# Patient Record
Sex: MALE | Race: WHITE | NOT HISPANIC OR LATINO | Employment: OTHER | URBAN - METROPOLITAN AREA
[De-identification: names, ages, dates, MRNs, and addresses within clinical notes are randomized per-mention and may not be internally consistent; named-entity substitution may affect disease eponyms.]

---

## 2017-05-23 DIAGNOSIS — R73.9 HYPERGLYCEMIA: ICD-10-CM

## 2017-05-23 DIAGNOSIS — R00.1 BRADYCARDIA: ICD-10-CM

## 2017-05-23 DIAGNOSIS — R03.0 PREHYPERTENSION: ICD-10-CM

## 2017-05-23 DIAGNOSIS — R53.83 FATIGUE, UNSPECIFIED TYPE: ICD-10-CM

## 2017-05-23 DIAGNOSIS — E34.9 TESTOSTERONE DEFICIENCY: ICD-10-CM

## 2017-05-23 DIAGNOSIS — J98.11 ATELECTASIS OF LEFT LUNG: ICD-10-CM

## 2017-05-23 DIAGNOSIS — I51.89 DIASTOLIC DYSFUNCTION WITHOUT HEART FAILURE: ICD-10-CM

## 2017-05-23 DIAGNOSIS — R79.9 ELEVATED BUN: ICD-10-CM

## 2017-05-23 DIAGNOSIS — E83.52 CALCIUM BLOOD INCREASED: ICD-10-CM

## 2017-05-23 RX ORDER — LORATADINE 10 MG/1
10 TABLET ORAL DAILY
COMMUNITY

## 2017-05-25 ENCOUNTER — OFFICE VISIT (OUTPATIENT)
Dept: FAMILY MEDICINE | Facility: CLINIC | Age: 77
End: 2017-05-25
Payer: MEDICARE

## 2017-05-25 VITALS
WEIGHT: 215 LBS | HEIGHT: 74 IN | HEART RATE: 64 BPM | BODY MASS INDEX: 27.59 KG/M2 | OXYGEN SATURATION: 96 % | SYSTOLIC BLOOD PRESSURE: 139 MMHG | DIASTOLIC BLOOD PRESSURE: 78 MMHG

## 2017-05-25 DIAGNOSIS — E78.5 DYSLIPIDEMIA: ICD-10-CM

## 2017-05-25 DIAGNOSIS — Z87.891 HISTORY OF SMOKING GREATER THAN 50 PACK YEARS: ICD-10-CM

## 2017-05-25 DIAGNOSIS — R73.9 HYPERGLYCEMIA: ICD-10-CM

## 2017-05-25 DIAGNOSIS — F32.A DEPRESSION, UNSPECIFIED DEPRESSION TYPE: ICD-10-CM

## 2017-05-25 DIAGNOSIS — R03.0 PREHYPERTENSION: Primary | ICD-10-CM

## 2017-05-25 DIAGNOSIS — R53.83 FATIGUE, UNSPECIFIED TYPE: ICD-10-CM

## 2017-05-25 PROBLEM — M19.90 ARTHRITIS: Status: ACTIVE | Noted: 2017-05-25

## 2017-05-25 PROCEDURE — 99214 OFFICE O/P EST MOD 30 MIN: CPT | Mod: ,,, | Performed by: FAMILY MEDICINE

## 2017-05-25 PROCEDURE — 1159F MED LIST DOCD IN RCRD: CPT | Mod: ,,, | Performed by: FAMILY MEDICINE

## 2017-05-25 NOTE — PROGRESS NOTES
SUBJECTIVE:   HPI: Gilberto Lee  is a 76 y.o. male who complains of fatigue   Hypertension (pre-HTN f/u); Hyperlipidemia (f/u); and Fatigue    Hypertension   This is a chronic problem. The problem has been waxing and waning since onset. Pertinent negatives include no chest pain, headaches or palpitations. There are no compliance problems.  There is no history of chronic renal disease.   Hyperlipidemia   This is a chronic problem. The current episode started more than 1 year ago. Recent lipid tests were reviewed and are variable. He has no history of chronic renal disease, hypothyroidism or liver disease. Pertinent negatives include no chest pain. Risk factors for coronary artery disease include dyslipidemia.   Fatigue   This is a recurrent problem. The problem occurs intermittently. The problem has been waxing and waning. Associated symptoms include fatigue. Pertinent negatives include no abdominal pain, arthralgias, change in bowel habit, chest pain, chills, diaphoresis, headaches, numbness, rash, vertigo, visual change or weakness. Nothing aggravates the symptoms. He has tried nothing for the symptoms.     Review of Systems   Constitutional: Positive for fatigue. Negative for activity change, appetite change, chills and diaphoresis.   HENT: Negative for ear discharge, ear pain and hearing loss.    Eyes: Negative for discharge, redness and itching.   Respiratory: Negative for apnea, chest tightness and wheezing.    Cardiovascular: Negative for chest pain, palpitations and leg swelling.   Gastrointestinal: Negative for abdominal distention, abdominal pain and change in bowel habit.   Endocrine: Negative for cold intolerance and polydipsia.   Genitourinary: Negative for dysuria.   Musculoskeletal: Negative for arthralgias and gait problem.   Skin: Negative for color change, pallor and rash.   Neurological: Negative for dizziness, vertigo, weakness, numbness and headaches.   Hematological: Negative for  adenopathy.   Psychiatric/Behavioral: Negative for agitation.        (Not in a hospital admission)  Review of patient's allergies indicates:   Allergen Reactions    Demerol  [meperidine]      Agitation  Other reaction(s): Unknown     Past Medical History:   Diagnosis Date    Arthritis     Atelectasis of left lung 07/2015    Bradycardia 07/17/2015    Calcium blood increased 08/09/2016    Depression     Diastolic dysfunction without heart failure 07/2015    Elevated BUN 08/09/2016    Fatigue 04/29/2015    Hyperglycemia 08/09/2016    Hyperlipidemia     Migraine without aura 10/2015    Prehypertension 11/2016    Testosterone deficiency 04/2015     Past Surgical History:   Procedure Laterality Date    ACHILLES TENDON SURGERY      APPENDECTOMY      DUPUYTREN CONTRACTURE RELEASE  2014    LUMBAR EPIDURAL INJECTION  2013    PROSTATE SURGERY      SACROILIAC JOINT INJECTION      TONSILLECTOMY      TRANSURETHRAL RESECTION OF PROSTATE       Family History   Problem Relation Age of Onset    COPD Father      Social History   Substance Use Topics    Smoking status: Former Smoker     Quit date: 7/23/1972    Smokeless tobacco: Not on file    Alcohol use Yes      Comment: occassional      Health Maintenance Topics with due status: Not Due       Topic Last Completion Date    Colonoscopy 01/01/2009    TETANUS VACCINE 10/21/2011    Lipid Panel 08/01/2016    Influenza Vaccine 11/09/2016     Immunization History   Administered Date(s) Administered    Influenza - High Dose 10/05/2013, 10/15/2014, 11/03/2015, 11/09/2016    Pneumococcal Conjugate - 13 Valent 11/03/2015    Pneumococcal Polysaccharide - 23 Valent 01/01/2012    Td (ADULT) 12/28/2007, 10/21/2011    Zoster 10/26/2010     OBJECTIVE:      Vitals:    05/25/17 0810   BP: 139/78   Pulse: 64     Physical Exam   Constitutional: He appears well-developed and well-nourished.   HENT:   Head: Normocephalic and atraumatic.   Right Ear: External ear normal.    Left Ear: External ear normal.   Eyes: EOM are normal. Pupils are equal, round, and reactive to light. Right eye exhibits no discharge. Left eye exhibits no discharge. No scleral icterus.   Neck: Normal range of motion. No tracheal deviation present. No thyromegaly present.   Cardiovascular: Normal rate and regular rhythm.  Exam reveals no gallop and no friction rub.    No murmur heard.  Pulmonary/Chest: No respiratory distress. He has no wheezes. He has no rales. He exhibits no tenderness.   Abdominal: Soft. Bowel sounds are normal. He exhibits no distension. There is no tenderness.   Musculoskeletal: He exhibits no edema or tenderness.   Neurological: No cranial nerve deficit.   Skin: No rash noted. No erythema.   Psychiatric: He has a normal mood and affect.      Assessment:       1. Fatigue, unspecified type    2. Hypercholesteremia    3. Hyperglycemia    4. Dyslipidemia    5. Depression, unspecified depression type    6. History of smoking greater than 50 pack years        Plan:       Fatigue, unspecified type  -     CBC auto differential; Future; Expected date: 05/25/2017  -     Comprehensive metabolic panel; Future; Expected date: 05/25/2017  -     Hemoglobin A1c; Future; Expected date: 05/25/2017  -     Lipid panel; Future; Expected date: 05/25/2017  -     Microalbumin/creatinine urine ratio; Future; Expected date: 05/25/2017  -     TSH; Future; Expected date: 05/25/2017    Hyperglycemia  -     Hemoglobin A1c; Future; Expected date: 05/25/2017  -     Microalbumin/creatinine urine ratio; Future; Expected date: 05/25/2017    Dyslipidemia  -     Lipid panel; Future; Expected date: 05/25/2017    Depression, unspecified depression type  Comments:  > 20 years, on wellbutrin and ritalin, see DR. Upton in [place]  advise pt to f.u for medicine adjustment.   Orders:  -     CBC auto differential; Future; Expected date: 05/25/2017  -     Comprehensive metabolic panel; Future; Expected date: 05/25/2017  -     Hemoglobin  A1c; Future; Expected date: 05/25/2017  -     Lipid panel; Future; Expected date: 05/25/2017  -     Microalbumin/creatinine urine ratio; Future; Expected date: 05/25/2017  -     TSH; Future; Expected date: 05/25/2017    History of smoking greater than 50 pack years  -      Abdominal Aorta; Future; Expected date: 05/25/2017    Prehypertension    Other orders  -     TSH; Future      5/25/2017 12:30 PM Meena Bernard M.D.

## 2017-05-26 ENCOUNTER — TELEPHONE (OUTPATIENT)
Dept: FAMILY MEDICINE | Facility: CLINIC | Age: 77
End: 2017-05-26

## 2017-05-26 DIAGNOSIS — E83.52 HYPERCALCEMIA: Primary | ICD-10-CM

## 2017-05-26 LAB
ALBUMIN SERPL-MCNC: 4.1 G/DL (ref 3.1–4.7)
ALP SERPL-CCNC: 69 IU/L (ref 40–104)
ALT (SGPT): 27 IU/L (ref 3–33)
AST SERPL-CCNC: 37 IU/L (ref 10–40)
BASOPHILS NFR BLD: 0.1 K/UL (ref 0–0.2)
BASOPHILS NFR BLD: 1 %
BILIRUB SERPL-MCNC: 0.8 MG/DL (ref 0.3–1)
BUN SERPL-MCNC: 19 MG/DL (ref 8–20)
CALCIUM SERPL-MCNC: 10.8 MG/DL (ref 7.7–10.4)
CHLORIDE: 101 MMOL/L (ref 98–110)
CO2 SERPL-SCNC: 29.4 MMOL/L (ref 22.8–31.6)
CREATININE RANDOM URINE: 105 MG/DL
CREATININE: 1.09 MG/DL (ref 0.6–1.4)
EOSINOPHIL NFR BLD: 0.2 K/UL (ref 0–0.7)
EOSINOPHIL NFR BLD: 3.9 %
ERYTHROCYTE [DISTWIDTH] IN BLOOD BY AUTOMATED COUNT: 12.5 % (ref 11.7–14.9)
GLUCOSE: 112 MG/DL (ref 70–99)
GRAN #: 3.1 K/UL (ref 1.4–6.5)
GRAN%: 52.6 %
HBA1C MFR BLD: 5.6 % (ref 3.1–6.5)
HCT VFR BLD AUTO: 43.4 % (ref 39–55)
HGB BLD-MCNC: 14.8 G/DL (ref 14–16)
IMMATURE GRANS (ABS): 0 K/UL (ref 0–1)
IMMATURE GRANULOCYTES: 0.2 %
LYMPH #: 2 K/UL (ref 1.2–3.4)
LYMPH%: 33.1 %
MCH RBC QN AUTO: 31 PG (ref 25–35)
MCHC RBC AUTO-ENTMCNC: 34.1 G/DL (ref 31–36)
MCV RBC AUTO: 91 FL (ref 80–100)
MICROALBUM.,U,RANDOM: <2 MCG/ML (ref 0–19.9)
MICROALBUMIN/CREATININE RATIO: NORMAL (ref 0–30)
MONO #: 0.6 K/UL (ref 0.1–0.6)
MONO%: 9.2 %
NUCLEATED RBCS: 0 %
PLATELET # BLD AUTO: 244 K/UL (ref 140–440)
PMV BLD AUTO: 9 FL (ref 8.8–12.7)
POTASSIUM SERPL-SCNC: 4.4 MMOL/L (ref 3.5–5)
PROT SERPL-MCNC: 7.3 G/DL (ref 6–8.2)
RBC # BLD AUTO: 4.77 M/UL (ref 4.3–5.9)
SODIUM: 137 MMOL/L (ref 134–144)
TSH SERPL DL<=0.005 MIU/L-ACNC: 2.64 ULU/ML (ref 0.3–5.6)
WBC # BLD AUTO: 6 K/UL (ref 5–10)

## 2017-05-30 ENCOUNTER — TELEPHONE (OUTPATIENT)
Dept: FAMILY MEDICINE | Facility: CLINIC | Age: 77
End: 2017-05-30

## 2017-05-30 LAB
MAGNESIUM SERPL-MCNC: 2.1 MG/DL (ref 1.5–2.6)
VITAMIN D, 1,25 (OH)2: 67.2 NG/ML (ref 30–100)

## 2017-05-30 NOTE — TELEPHONE ENCOUNTER
----- Message from Meena Bernard MD sent at 5/30/2017  2:59 PM CDT -----  Notify pt : abnormal result ( PTH high) , please make an appointment with me to review in two weeks

## 2017-06-22 ENCOUNTER — TELEPHONE (OUTPATIENT)
Dept: ENDOCRINOLOGY | Facility: CLINIC | Age: 77
End: 2017-06-22

## 2017-06-22 ENCOUNTER — OFFICE VISIT (OUTPATIENT)
Dept: FAMILY MEDICINE | Facility: CLINIC | Age: 77
End: 2017-06-22
Payer: MEDICARE

## 2017-06-22 VITALS
DIASTOLIC BLOOD PRESSURE: 86 MMHG | SYSTOLIC BLOOD PRESSURE: 132 MMHG | BODY MASS INDEX: 27.21 KG/M2 | HEART RATE: 66 BPM | HEIGHT: 74 IN | OXYGEN SATURATION: 95 % | WEIGHT: 212 LBS

## 2017-06-22 DIAGNOSIS — R53.83 FATIGUE, UNSPECIFIED TYPE: ICD-10-CM

## 2017-06-22 DIAGNOSIS — R79.89 INCREASED PTH LEVEL: ICD-10-CM

## 2017-06-22 DIAGNOSIS — E83.52 SERUM CALCIUM ELEVATED: ICD-10-CM

## 2017-06-22 DIAGNOSIS — E21.0 PRIMARY HYPERPARATHYROIDISM: Primary | ICD-10-CM

## 2017-06-22 LAB — PHOSPHATE FLD-MCNC: 2.6 MG/DL (ref 2.5–4.9)

## 2017-06-22 PROCEDURE — 1159F MED LIST DOCD IN RCRD: CPT | Mod: ,,, | Performed by: FAMILY MEDICINE

## 2017-06-22 PROCEDURE — 99214 OFFICE O/P EST MOD 30 MIN: CPT | Mod: ,,, | Performed by: FAMILY MEDICINE

## 2017-06-22 NOTE — PROGRESS NOTES
SUBJECTIVE:   HPI: Gilberto Lee  is a 76 y.o. male who complains of fatigue, follow up with abnormal lab result ( high on PTH and calcium)     Fatigue   This is a recurrent problem. The problem occurs intermittently. The problem has been waxing and waning. Associated symptoms include fatigue. Pertinent negatives include no abdominal pain, arthralgias, change in bowel habit, chest pain, chills, diaphoresis, headaches, numbness, rash, vertigo, visual change or weakness. Nothing aggravates the symptoms. He has tried nothing for the symptoms.     Review of Systems   Constitutional: Positive for fatigue. Negative for activity change, appetite change, chills and diaphoresis.   HENT: Negative for ear discharge, ear pain and hearing loss.    Eyes: Negative for discharge, redness and itching.   Respiratory: Negative for apnea, chest tightness and wheezing.    Cardiovascular: Negative for chest pain, palpitations and leg swelling.   Gastrointestinal: Negative for abdominal distention, abdominal pain and change in bowel habit.   Endocrine: Negative for cold intolerance and polydipsia.   Genitourinary: Negative for dysuria.   Musculoskeletal: Negative for arthralgias and gait problem.   Skin: Negative for color change, pallor and rash.   Neurological: Negative for dizziness, vertigo, weakness, numbness and headaches.   Hematological: Negative for adenopathy.   Psychiatric/Behavioral: Negative for agitation.        (Not in a hospital admission)  Review of patient's allergies indicates:   Allergen Reactions    Demerol  [meperidine]      Agitation  Other reaction(s): Unknown     Past Medical History:   Diagnosis Date    Arthritis     Atelectasis of left lung 07/2015    Bradycardia 07/17/2015    Calcium blood increased 08/09/2016    Depression     Diastolic dysfunction without heart failure 07/2015    Elevated BUN 08/09/2016    Fatigue 04/29/2015    Hyperglycemia 08/09/2016    Hyperlipidemia     Migraine without  aura 10/2015    Prehypertension 11/2016    Testosterone deficiency 04/2015     Past Surgical History:   Procedure Laterality Date    ACHILLES TENDON SURGERY      APPENDECTOMY      DUPUYTREN CONTRACTURE RELEASE  2014    LUMBAR EPIDURAL INJECTION  2013    PROSTATE SURGERY      SACROILIAC JOINT INJECTION      TONSILLECTOMY      TRANSURETHRAL RESECTION OF PROSTATE       Family History   Problem Relation Age of Onset    COPD Father      Social History   Substance Use Topics    Smoking status: Former Smoker     Quit date: 7/23/1972    Smokeless tobacco: Not on file    Alcohol use Yes      Comment: occassional      Health Maintenance Topics with due status: Not Due       Topic Last Completion Date    Colonoscopy 01/01/2009    TETANUS VACCINE 10/21/2011    Lipid Panel 08/01/2016    Influenza Vaccine 11/09/2016     Immunization History   Administered Date(s) Administered    Influenza - High Dose 10/05/2013, 10/15/2014, 11/03/2015, 11/09/2016    Pneumococcal Conjugate - 13 Valent 11/03/2015    Pneumococcal Polysaccharide - 23 Valent 01/01/2012    Td (ADULT) 12/28/2007, 10/21/2011    Zoster 10/26/2010     OBJECTIVE:      Vitals:    06/22/17 1026   BP: 132/86   Pulse: 66     Physical Exam   Constitutional: He appears well-developed and well-nourished.   HENT:   Head: Normocephalic and atraumatic.   Right Ear: External ear normal.   Left Ear: External ear normal.   Eyes: EOM are normal. Pupils are equal, round, and reactive to light. Right eye exhibits no discharge. Left eye exhibits no discharge. No scleral icterus.   Neck: Normal range of motion. No tracheal deviation present. No thyromegaly present.   Cardiovascular: Normal rate and regular rhythm.  Exam reveals no gallop and no friction rub.    No murmur heard.  Pulmonary/Chest: No respiratory distress. He has no wheezes. He has no rales. He exhibits no tenderness.   Abdominal: Soft. Bowel sounds are normal. He exhibits no distension. There is no  tenderness.   Musculoskeletal: He exhibits no edema or tenderness.   Neurological: No cranial nerve deficit.   Skin: No rash noted. No erythema.   Psychiatric: He has a normal mood and affect.      Assessment:       1. Fatigue, unspecified type    2 Primary hyperparathyroidism   3 Elevated calcium   4  Elevated PTH               Plan:     Primary hyperparathyroidism  Comments:   in 5/26/2017.  ca 10.8.   his of calcium high.  ca 10.7 in 8/2016, 11/2016 wnl . refer to endo  Orders:  -     Calcium; Future; Expected date: 06/22/2017  -     Phosphorus; Future; Expected date: 06/22/2017  -     Phosphorus; Future; Expected date: 06/22/2017  -     Ambulatory referral to Endocrinology    Serum calcium elevated  Comments:  10.8 ,  Vitamin D wnl.  advise pt to stop taking vitamin d supplement. Well hydration  Orders:  -     Calcium; Future; Expected date: 06/22/2017  -     Phosphorus; Future; Expected date: 06/22/2017  -     Phosphorus; Future; Expected date: 06/22/2017  -     Ambulatory referral to Endocrinology    Fatigue, unspecified type  Comments:  likely caused by high ca.   Orders:  -     Calcium; Future; Expected date: 06/22/2017  -     Phosphorus; Future; Expected date: 06/22/2017  -     Phosphorus; Future; Expected date: 06/22/2017  -     Ambulatory referral to Endocrinology    Increased PTH level  -     Calcium; Future; Expected date: 06/22/2017  -     Phosphorus; Future; Expected date: 06/22/2017  -     Phosphorus; Future; Expected date: 06/22/2017  -     Ambulatory referral to Endocrinology    Advise RTC or ER if worsening of symptoms , pt voices understanding.

## 2017-06-22 NOTE — TELEPHONE ENCOUNTER
Received a faxed referral from Dr DE LUNA office..  Scheduled appt and added to wait list. Called patient left message to return call

## 2017-06-29 ENCOUNTER — PATIENT MESSAGE (OUTPATIENT)
Dept: FAMILY MEDICINE | Facility: CLINIC | Age: 77
End: 2017-06-29

## 2017-07-03 LAB — MISCELLANEOUS LAB TEST ORDER: NORMAL

## 2017-07-05 ENCOUNTER — TELEPHONE (OUTPATIENT)
Dept: FAMILY MEDICINE | Facility: CLINIC | Age: 77
End: 2017-07-05

## 2017-07-05 PROBLEM — R82.994 HYPERCALCIURIA: Status: ACTIVE | Noted: 2017-07-05

## 2017-07-05 NOTE — TELEPHONE ENCOUNTER
Abnormal lab result:  24 hrs calcium is 495 ( normal <350). Likely 2/2 primary  Hyperparathyroidism. Advise pt follow up with Endo. thanks

## 2017-07-10 ENCOUNTER — PATIENT MESSAGE (OUTPATIENT)
Dept: FAMILY MEDICINE | Facility: CLINIC | Age: 77
End: 2017-07-10

## 2017-07-10 DIAGNOSIS — E78.00 HYPERCHOLESTEREMIA: ICD-10-CM

## 2017-07-10 DIAGNOSIS — R51.9 GENERALIZED HEADACHES: ICD-10-CM

## 2017-07-10 RX ORDER — SIMVASTATIN 20 MG/1
TABLET, FILM COATED ORAL
Qty: 90 TABLET | Refills: 1 | Status: SHIPPED | OUTPATIENT
Start: 2017-07-10 | End: 2018-02-02 | Stop reason: SDUPTHER

## 2017-07-10 RX ORDER — SUMATRIPTAN SUCCINATE 100 MG/1
TABLET ORAL
Qty: 27 TABLET | Refills: 1 | Status: SHIPPED | OUTPATIENT
Start: 2017-07-10 | End: 2018-02-02 | Stop reason: SDUPTHER

## 2017-08-01 ENCOUNTER — OFFICE VISIT (OUTPATIENT)
Dept: FAMILY MEDICINE | Facility: CLINIC | Age: 77
End: 2017-08-01
Payer: MEDICARE

## 2017-08-01 VITALS
HEIGHT: 74 IN | RESPIRATION RATE: 18 BRPM | WEIGHT: 214.19 LBS | OXYGEN SATURATION: 98 % | HEART RATE: 89 BPM | SYSTOLIC BLOOD PRESSURE: 134 MMHG | DIASTOLIC BLOOD PRESSURE: 86 MMHG | BODY MASS INDEX: 27.49 KG/M2

## 2017-08-01 DIAGNOSIS — R53.83 FATIGUE, UNSPECIFIED TYPE: ICD-10-CM

## 2017-08-01 DIAGNOSIS — R03.0 PREHYPERTENSION: ICD-10-CM

## 2017-08-01 DIAGNOSIS — K59.00 CONSTIPATION, UNSPECIFIED CONSTIPATION TYPE: ICD-10-CM

## 2017-08-01 DIAGNOSIS — E21.3 HYPERPARATHYROIDISM: Primary | ICD-10-CM

## 2017-08-01 PROBLEM — N40.0 BPH WITHOUT OBSTRUCTION/LOWER URINARY TRACT SYMPTOMS: Status: ACTIVE | Noted: 2017-08-01

## 2017-08-01 PROCEDURE — 99213 OFFICE O/P EST LOW 20 MIN: CPT | Mod: ,,, | Performed by: INTERNAL MEDICINE

## 2017-08-01 PROCEDURE — 1159F MED LIST DOCD IN RCRD: CPT | Mod: ,,, | Performed by: INTERNAL MEDICINE

## 2017-08-01 NOTE — PROGRESS NOTES
Subjective:       Patient ID: Gilberto Lee is a 76 y.o. male.    Chief Complaint: Follow-up; Hyperparathyroidism; and Fatigue    Mr Gilberto Lee is here for follow up for his hyperparathyroidism which is primary and secondary to an adrenal adenoma. He was evaluated in [place] by a surgical specialist who ONLY does minimally invasive parathyroidectomy with intraoperative nuclear med sestimibi with biopsies of every parathyroid gland. The cost of the evaluation by Dr Obrien was free and the surgery will only net cost 14,000 with no down time. The traditional surgery is an inpatient stay and quite invasive. Patient is here asking for a referral and possibly an appeal to [facility] who denied it because it was a surgery that was outside of a certain radius.        Review of Systems   Constitutional: Positive for fatigue. Negative for activity change, diaphoresis and fever.   HENT: Negative for congestion, ear pain, postnasal drip, sinus pressure, sore throat and trouble swallowing.    Eyes: Negative for pain.   Respiratory: Negative for cough, chest tightness, shortness of breath and wheezing.    Cardiovascular: Negative for chest pain, palpitations and leg swelling.   Gastrointestinal: Positive for constipation. Negative for abdominal distention, abdominal pain, blood in stool and diarrhea.   Endocrine: Negative for cold intolerance and heat intolerance.   Genitourinary: Negative for decreased urine volume, difficulty urinating, dysuria, flank pain, frequency and hematuria.   Musculoskeletal: Positive for neck pain. Negative for arthralgias, back pain, joint swelling and myalgias.   Skin: Negative for pallor, rash and wound.   Neurological: Positive for headaches. Negative for tremors, syncope, weakness, light-headedness and numbness.   Hematological: Negative for adenopathy.   Psychiatric/Behavioral: Negative for confusion, decreased concentration, hallucinations, self-injury, sleep disturbance and suicidal  ideas. The patient is not nervous/anxious.        Past Medical History:   Diagnosis Date    Arthritis     Atelectasis of left lung 07/2015    Bradycardia 07/17/2015    Calcium blood increased 08/09/2016    Depression     Diastolic dysfunction without heart failure 07/2015    Elevated BUN 08/09/2016    Fatigue 04/29/2015    Hyperglycemia 08/09/2016    Hyperlipidemia     Migraine without aura 10/2015    Prehypertension 11/2016    Testosterone deficiency 04/2015    Thyroid disease        Past Surgical History:   Procedure Laterality Date    ACHILLES TENDON SURGERY      APPENDECTOMY      DUPUYTREN CONTRACTURE RELEASE  2014    LUMBAR EPIDURAL INJECTION  2013    PROSTATE SURGERY      SACROILIAC JOINT INJECTION      TONSILLECTOMY      TRANSURETHRAL RESECTION OF PROSTATE         Family History   Problem Relation Age of Onset    COPD Father        Social History     Social History    Marital status:      Spouse name: N/A    Number of children: N/A    Years of education: N/A     Social History Main Topics    Smoking status: Former Smoker     Quit date: 7/23/1972    Smokeless tobacco: Never Used    Alcohol use Yes      Comment: occassional    Drug use: No    Sexual activity: Not Asked     Other Topics Concern    None     Social History Narrative    None       Current Outpatient Prescriptions   Medication Sig Dispense Refill    buPROPion (WELLBUTRIN) 75 MG tablet Take 150 mg by mouth 3 (three) times daily. 2 Tablet TID      glucosamine-chondroit-vit C-Mn (GLUCOSAMINE CHONDROITIN MAXSTR) 500-400 mg Cap Take 1 tablet by mouth 3 (three) times daily. 3 Capsule Oral Every day      loratadine (CLARITIN) 10 mg tablet Take 10 mg by mouth once daily.      methylphenidate (RITALIN) 10 MG tablet Take 10 mg by mouth. 1 Tablet Oral Three times a day      naproxen sodium (ANAPROX) 220 MG tablet Take 220 mg by mouth 2 (two) times daily with meals. 1 Tablet Oral Twice a day       omega-3 fatty  acids 1,000 mg Cap Take by mouth once daily. 1 Capsule Oral Every day      simvastatin (ZOCOR) 20 MG tablet TAKE 1 TABLET EVERY DAY 90 tablet 1    sumatriptan (IMITREX) 100 MG tablet TAKE 1 TABLET  EVERY  2  HOURS AS NEEDED 27 tablet 1     No current facility-administered medications for this visit.        Review of patient's allergies indicates:   Allergen Reactions    Demerol  [meperidine]      Agitation  Other reaction(s): Unknown       Objective:      Physical Exam   Constitutional: He is oriented to person, place, and time. He appears well-developed and well-nourished. No distress.   HENT:   Head: Normocephalic and atraumatic.   Right Ear: External ear normal.   Left Ear: External ear normal.   Nose: Nose normal.   Mouth/Throat: Oropharynx is clear and moist.   Eyes: Conjunctivae and EOM are normal. Right eye exhibits no discharge. Left eye exhibits no discharge. No scleral icterus.   Neck: Normal range of motion. Neck supple. No JVD present. No tracheal deviation present. No thyroid mass and no thyromegaly present.   Cardiovascular: Normal rate, regular rhythm, normal heart sounds and intact distal pulses.  Exam reveals no gallop.    No murmur heard.  Pulmonary/Chest: Effort normal and breath sounds normal. No respiratory distress. He has no wheezes. He has no rales.   Abdominal: Soft. Bowel sounds are normal. He exhibits no distension and no mass. There is no tenderness.   Musculoskeletal: Normal range of motion. He exhibits no edema or tenderness.   Lymphadenopathy:     He has no cervical adenopathy.   Neurological: He is alert and oriented to person, place, and time.   Skin: Skin is warm and dry. No rash noted. He is not diaphoretic. No erythema.   Psychiatric: He has a normal mood and affect. His behavior is normal. Judgment and thought content normal.     .  Assessment:       1. Hyperparathyroidism    2. Prehypertension    3. Constipation, unspecified constipation type    4. Fatigue, unspecified type         Plan:       Hyperparathyroidism  -     Ambulatory referral to General Surgery- [facility] in [place], [place], Dr Jaylon Obrien  Prehypertension- secondary to hyperparathyroidism   Constipation, unspecified constipation type-secondary to hyperparathyroidism   Fatigue, unspecified type-secondary to hyperparathyroidism

## 2017-08-01 NOTE — PATIENT INSTRUCTIONS
Understanding Primary Hyperparathyroidism    The parathyroid glands are 4 tiny glands in the neck. They make parathyroid hormone (PTH). PTH controls the amount of calcium and phosphorus in your blood. Primary hyperparathyroidism is when there is too much PTH in your blood. It occurs when one or more of the glands are too active.  What causes primary hyperparathyroidism?  Primary hyperparathyroidism can occur when a parathyroid gland becomes enlarged. Hyperparathyroidism can also occur as a complication of other medical conditions, such as kidney failure, or rickets, but in these conditions, calcium is usually not high. This is called secondary hyperthyroidism.  Why is it a problem?  With primary hyperparathyroidism, your glands make too much PTH. The job of PTH is to tell the body how to control calcium. Too much PTH means the body increases the amount of calcium in the blood. This leads to a problem called hypercalcemia. This is when the amount of calcium in the blood becomes too high. Hypercalcemia can cause serious health problems.  High calcium can be caused by other conditions, like taking too many calcium and vitamin D supplements, certain genetic conditions, or other circumstances. Your doctor will find out if primary hyperparathyroidism is the cause of your high calcium levels. If so, treatment options will be discussed with you.  What are the risk factors for primary hyperparathyroidism?  The risk factors for developing this condition include:  · Being a woman (its less common in men)  · Being older (its more likely to occur with age)  · Having parents or siblings with the condition or other endocrine tumors  · Having certain kidney problems  · Taking certain medications  · Having had radiation treatment in the head or neck  What are the symptoms of primary hyperparathyroidism?  Symptoms of the condition can include:  · Muscle weakness  · Depression  · Tiredness  · Confusion and memory loss  · Poor  memory  · Nausea and vomiting  · Pain in the stomach area (abdomen)  · Hard stools (constipation)  · Stomach ulcers  · Need to urinate often  · Kidney stones  · Joint or bone pain  · Bone disease (osteopenia or osteoporosis), an increase in bone fractures  · High blood pressure  · Increased thirst  How is primary hyperparathyroidism treated?  If primary hyperparathyroidism is not treated, it can get worse over time. Surgery may be done to remove any enlarged parathyroid glands. This lets the amount of calcium in the blood go back to normal. Your doctors may want to ensure that you have enough vitamin D and calcium nutrition before the surgery. This will reduce the risk of low calcium after the surgery. You and your doctor can discuss your treatment options. Be sure to ask any questions you have.  Date Last Reviewed: 8/13/2014  © 4743-3168 [employer]. [address] [place] [address], [place] [address]. All rights reserved. This information is not intended as a substitute for professional medical care. Always follow your healthcare professional's instructions.

## 2017-08-03 ENCOUNTER — PATIENT MESSAGE (OUTPATIENT)
Dept: FAMILY MEDICINE | Facility: CLINIC | Age: 77
End: 2017-08-03

## 2017-08-09 ENCOUNTER — PATIENT MESSAGE (OUTPATIENT)
Dept: FAMILY MEDICINE | Facility: CLINIC | Age: 77
End: 2017-08-09

## 2017-08-14 ENCOUNTER — TELEPHONE (OUTPATIENT)
Dept: FAMILY MEDICINE | Facility: CLINIC | Age: 77
End: 2017-08-14

## 2017-08-14 NOTE — TELEPHONE ENCOUNTER
Pt wife Kristin has called, she states her  has been calling and leaving messages regarding his referral.  Can you please scot her at [phone]

## 2017-08-31 DIAGNOSIS — E21.3 HYPERPARATHYROIDISM: ICD-10-CM

## 2017-08-31 DIAGNOSIS — R79.89 INCREASED PTH LEVEL: Primary | ICD-10-CM

## 2017-09-28 ENCOUNTER — PATIENT MESSAGE (OUTPATIENT)
Dept: FAMILY MEDICINE | Facility: CLINIC | Age: 77
End: 2017-09-28

## 2017-09-28 ENCOUNTER — TELEPHONE (OUTPATIENT)
Dept: FAMILY MEDICINE | Facility: CLINIC | Age: 77
End: 2017-09-28

## 2017-09-28 DIAGNOSIS — E21.3 HYPERPARATHYROIDISM: Primary | ICD-10-CM

## 2017-09-28 NOTE — TELEPHONE ENCOUNTER
Patient states that the surgeon order labs and he doesn't think they will be accepted here. He is requesting that we generate the orders for him. The orders that the surgeon generated where: Serum Calcium and PTH, Intact Frozen-speciman

## 2017-11-21 ENCOUNTER — OFFICE VISIT (OUTPATIENT)
Dept: FAMILY MEDICINE | Facility: CLINIC | Age: 77
End: 2017-11-21
Payer: MEDICARE

## 2017-11-21 VITALS
BODY MASS INDEX: 27.72 KG/M2 | SYSTOLIC BLOOD PRESSURE: 130 MMHG | WEIGHT: 216 LBS | HEART RATE: 68 BPM | DIASTOLIC BLOOD PRESSURE: 88 MMHG | HEIGHT: 74 IN | OXYGEN SATURATION: 98 % | RESPIRATION RATE: 16 BRPM

## 2017-11-21 DIAGNOSIS — G43.009 MIGRAINE WITHOUT AURA AND RESPONSIVE TO TREATMENT: ICD-10-CM

## 2017-11-21 DIAGNOSIS — E78.5 DYSLIPIDEMIA: ICD-10-CM

## 2017-11-21 DIAGNOSIS — R53.83 FATIGUE, UNSPECIFIED TYPE: ICD-10-CM

## 2017-11-21 DIAGNOSIS — R79.89 INCREASED PTH LEVEL: ICD-10-CM

## 2017-11-21 DIAGNOSIS — R03.0 BORDERLINE HYPERTENSION: Primary | ICD-10-CM

## 2017-11-21 DIAGNOSIS — E55.9 VITAMIN D DEFICIENCY: ICD-10-CM

## 2017-11-21 DIAGNOSIS — Z12.5 SCREENING FOR MALIGNANT NEOPLASM OF PROSTATE: ICD-10-CM

## 2017-11-21 PROCEDURE — 99215 OFFICE O/P EST HI 40 MIN: CPT | Mod: ,,, | Performed by: INTERNAL MEDICINE

## 2017-11-21 NOTE — PATIENT INSTRUCTIONS

## 2017-11-22 LAB
ALBUMIN SERPL-MCNC: 4 G/DL (ref 3.1–4.7)
ALP SERPL-CCNC: 55 IU/L (ref 40–104)
ALT (SGPT): 30 IU/L (ref 3–33)
AST SERPL-CCNC: 39 IU/L (ref 10–40)
BILIRUB SERPL-MCNC: 0.8 MG/DL (ref 0.3–1)
BUN SERPL-MCNC: 20 MG/DL (ref 8–20)
CALCIUM SERPL-MCNC: 9.2 MG/DL (ref 7.7–10.4)
CHLORIDE: 102 MMOL/L (ref 98–110)
CO2 SERPL-SCNC: 28.7 MMOL/L (ref 22.8–31.6)
CREATININE: 1.07 MG/DL (ref 0.6–1.4)
GLUCOSE: 112 MG/DL (ref 70–99)
POTASSIUM SERPL-SCNC: 4.3 MMOL/L (ref 3.5–5)
PROT SERPL-MCNC: 7.6 G/DL (ref 6–8.2)
SODIUM: 137 MMOL/L (ref 134–144)

## 2017-12-01 ENCOUNTER — TELEPHONE (OUTPATIENT)
Dept: FAMILY MEDICINE | Facility: CLINIC | Age: 77
End: 2017-12-01

## 2017-12-01 NOTE — TELEPHONE ENCOUNTER
----- Message from Henrietta Lovell MD sent at 11/26/2017  8:38 AM CST -----  If glucose is a true fasting then he has insulin resistance and will discuss as well on ov that he should make within the next few weeks to discuss this and the elevated carbon dioxide in his blood

## 2017-12-07 ENCOUNTER — OFFICE VISIT (OUTPATIENT)
Dept: FAMILY MEDICINE | Facility: CLINIC | Age: 77
End: 2017-12-07
Payer: MEDICARE

## 2017-12-07 VITALS
HEIGHT: 74 IN | DIASTOLIC BLOOD PRESSURE: 84 MMHG | HEART RATE: 58 BPM | RESPIRATION RATE: 16 BRPM | OXYGEN SATURATION: 97 % | BODY MASS INDEX: 27.72 KG/M2 | SYSTOLIC BLOOD PRESSURE: 132 MMHG | WEIGHT: 216 LBS

## 2017-12-07 DIAGNOSIS — R03.0 PREHYPERTENSION: ICD-10-CM

## 2017-12-07 DIAGNOSIS — R73.09 ELEVATED GLUCOSE: ICD-10-CM

## 2017-12-07 DIAGNOSIS — F32.A CONTROLLED DEPRESSION: ICD-10-CM

## 2017-12-07 DIAGNOSIS — E87.20 ACIDOSIS, METABOLIC: Primary | ICD-10-CM

## 2017-12-07 DIAGNOSIS — R00.1 BRADYCARDIA: ICD-10-CM

## 2017-12-07 PROBLEM — R94.8: Status: ACTIVE | Noted: 2017-12-07

## 2017-12-07 PROCEDURE — 99214 OFFICE O/P EST MOD 30 MIN: CPT | Mod: ,,, | Performed by: INTERNAL MEDICINE

## 2017-12-07 NOTE — PATIENT INSTRUCTIONS

## 2017-12-08 NOTE — PROGRESS NOTES
Subjective:       Patient ID: Gilberto Lee is a 77 y.o. male.    Chief Complaint: Labs Only (lab review)    HPI  Review of Systems    Objective:      Physical Exam      Plan:

## 2017-12-08 NOTE — PROGRESS NOTES
Subjective:       Patient ID: Gilberto Lee is a 77 y.o. male.    Chief Complaint: Labs Only (lab review)    Patient is a 77-year-old gentleman who recently had a parathyroidectomy for hyperparathyroidism and is doing quite well and off of all blood pressure medications. We did some recent blood work in order to recheck ionized calcium and they did a venous blood gas showed a pH of 7.30 and a PCO2 of 56 with a base excess of 30 and a bicarbonate of 31. BPH should be normal on a venous blood gas in the PCO2 should not be much higher than 5 both the numbers are off and him not sure utility of this venous blood gas and evaluating the patient overall as far as CO2 retention and acidosis. He has a 40-pack-year history of smoking but does not smoke any longer and has no history of asthma but was never diagnosed with COPD or emphysema. He never had pneumonia was ever heard himself wheeze. His blood pressure today stable as well.  On last office visit we did discuss changing him from short-acting Wellbutrin which she is on 150 mg 3 times a day to Wellbutrin  twice a day which would not only bring down the dose but would decrease the frequency of his migraines. The patient is okay with his migraines as he takes a fourth of Imitrex 100 mg tablet which could be 25 mg the onset of a headache and this seems to help immediately. He has about 4-5 migraines.      Review of Systems   Constitutional: Negative for activity change, diaphoresis, fatigue and fever.   HENT: Negative for congestion, ear pain, postnasal drip, sinus pressure, sore throat and trouble swallowing.    Eyes: Negative for pain.   Respiratory: Negative for cough, chest tightness, shortness of breath and wheezing.    Cardiovascular: Negative for chest pain, palpitations and leg swelling.   Gastrointestinal: Negative for abdominal distention, abdominal pain, blood in stool, constipation and diarrhea.   Endocrine: Negative for cold intolerance and heat  intolerance.   Genitourinary: Negative for decreased urine volume, difficulty urinating, dysuria, flank pain, frequency and hematuria.   Musculoskeletal: Negative for arthralgias, back pain, joint swelling, myalgias and neck pain.   Skin: Negative for pallor, rash and wound.   Neurological: Negative for tremors, syncope, weakness, light-headedness, numbness and headaches.   Hematological: Negative for adenopathy.   Psychiatric/Behavioral: Negative for confusion, decreased concentration, hallucinations, self-injury, sleep disturbance and suicidal ideas. The patient is not nervous/anxious.        Objective:      Physical Exam   Constitutional: He is oriented to person, place, and time. He appears well-developed and well-nourished. No distress.   HENT:   Head: Normocephalic and atraumatic.   Right Ear: External ear normal.   Left Ear: External ear normal.   Nose: Nose normal.   Mouth/Throat: Oropharynx is clear and moist.   Eyes: Conjunctivae and EOM are normal. Right eye exhibits no discharge. Left eye exhibits no discharge. No scleral icterus.   Neck: Normal range of motion. Neck supple. No JVD present. No tracheal deviation present. No thyromegaly present.   Cardiovascular: Normal rate, regular rhythm, normal heart sounds and intact distal pulses.  Exam reveals no gallop.    No murmur heard.  Pulmonary/Chest: Effort normal and breath sounds normal. No respiratory distress. He has no wheezes. He has no rales.   Abdominal: Soft. Bowel sounds are normal. He exhibits no distension and no mass. There is no tenderness.   Musculoskeletal: Normal range of motion. He exhibits no edema or tenderness.   Lymphadenopathy:     He has no cervical adenopathy.   Neurological: He is alert and oriented to person, place, and time.   Skin: Skin is warm and dry. No rash noted. He is not diaphoretic. No erythema.   Psychiatric: He has a normal mood and affect. His behavior is normal. Judgment and thought content normal.       Lab Results    Component Value Date    WBC 6.0 05/26/2017    HGB 14.8 05/26/2017    HCT 43.4 05/26/2017     05/26/2017    CHOL 166 08/12/2013    TRIG 68 08/12/2013    HDL 59 08/12/2013    ALT 23 08/12/2013    AST 39 11/22/2017     11/22/2017    K 4.3 11/22/2017     11/22/2017    CREATININE 1.07 11/22/2017    BUN 20 11/22/2017    CO2 28.7 11/22/2017    TSH 2.64 05/26/2017    PSA 1.75 08/12/2013    HGBA1C 5.6 05/26/2017     Assessment:       1. Abnormal 24 hour ambulatory pH monitoring study    2. Elevated glucose    3. Bradycardia    4. Prehypertension        Plan:       Acidosis, metabolic-with acidosis on venous abg done for ionized calcium - also his pco2 was a little high. He has smoked for 40 ppd years  -     Arterial blood gas; Future    Elevated glucose-aic within normal limits and no FH of diabetes    Bradycardia-asymptomatic    Prehypertension-resolved after hyperparathyroidectomy    Controlled depression-on welbutrin albeit high dose     Migraines - patient has them under control and hence is hesitant to go off of his welbutrin

## 2018-02-02 ENCOUNTER — PATIENT MESSAGE (OUTPATIENT)
Dept: FAMILY MEDICINE | Facility: CLINIC | Age: 78
End: 2018-02-02

## 2018-02-02 DIAGNOSIS — R51.9 GENERALIZED HEADACHES: ICD-10-CM

## 2018-02-02 DIAGNOSIS — E78.00 HYPERCHOLESTEREMIA: ICD-10-CM

## 2018-02-05 RX ORDER — SIMVASTATIN 20 MG/1
20 TABLET, FILM COATED ORAL DAILY
Qty: 90 TABLET | Refills: 3 | Status: SHIPPED | OUTPATIENT
Start: 2018-02-05 | End: 2019-02-21 | Stop reason: SDUPTHER

## 2018-02-05 RX ORDER — SUMATRIPTAN SUCCINATE 100 MG/1
TABLET ORAL
Qty: 81 TABLET | Refills: 3 | Status: SHIPPED | OUTPATIENT
Start: 2018-02-05 | End: 2019-02-21 | Stop reason: SDUPTHER

## 2018-02-05 RX ORDER — BUPROPION HYDROCHLORIDE 75 MG/1
150 TABLET ORAL 3 TIMES DAILY
Qty: 540 TABLET | Refills: 3 | Status: SHIPPED | OUTPATIENT
Start: 2018-02-05 | End: 2019-02-21 | Stop reason: SDUPTHER

## 2018-06-07 ENCOUNTER — OFFICE VISIT (OUTPATIENT)
Dept: FAMILY MEDICINE | Facility: CLINIC | Age: 78
End: 2018-06-07
Payer: MEDICARE

## 2018-06-07 VITALS
RESPIRATION RATE: 16 BRPM | OXYGEN SATURATION: 96 % | DIASTOLIC BLOOD PRESSURE: 78 MMHG | BODY MASS INDEX: 27.57 KG/M2 | HEART RATE: 61 BPM | HEIGHT: 74 IN | WEIGHT: 214.81 LBS | SYSTOLIC BLOOD PRESSURE: 134 MMHG

## 2018-06-07 DIAGNOSIS — E78.01 FAMILIAL HYPERCHOLESTEROLEMIA: Primary | ICD-10-CM

## 2018-06-07 DIAGNOSIS — I51.89 DIASTOLIC DYSFUNCTION WITHOUT HEART FAILURE: ICD-10-CM

## 2018-06-07 DIAGNOSIS — E21.3 HYPERPARATHYROIDISM: ICD-10-CM

## 2018-06-07 DIAGNOSIS — I10 ESSENTIAL HYPERTENSION: ICD-10-CM

## 2018-06-07 DIAGNOSIS — R53.83 FATIGUE, UNSPECIFIED TYPE: ICD-10-CM

## 2018-06-07 PROCEDURE — 99214 OFFICE O/P EST MOD 30 MIN: CPT | Mod: ,,, | Performed by: INTERNAL MEDICINE

## 2018-06-07 RX ORDER — LOSARTAN POTASSIUM 25 MG/1
25 TABLET ORAL DAILY
Qty: 90 TABLET | Refills: 3 | Status: SHIPPED | OUTPATIENT
Start: 2018-06-07 | End: 2018-12-10

## 2018-06-07 NOTE — PROGRESS NOTES
Subjective:       Patient ID: Gilberto Lee is a 77 y.o. male.    Chief Complaint: Follow-up (6 mon f/u) and Hypertension    77-year-old gentleman who is here for follow-up at 6 months for labile hypertension. I saw him last a few weeks postoperative from a parathyroidectomy. He had had recording low blood pressures and so his verapamil was discontinued. He has been keeping track of his pressure and the highest numbers are approximately 150/90 and the lowest she's had has been 130/85. He does have family history for hypertension. He is concerned that if he goes on a blood pressure medication you feel a worsening of his chronic fatigue. He also has a history of depression which is well controlled on bupropion. However he still complains of daily fatigue. He takes a nap after lunch for about an hour and sleeps at night 6 hours. Upon questioning he has no symptomatology of sleep apnea; meeting snoring, his wife of over 45 years never appreciated him with erratic breathing. He did have an echocardiogram 3 years prior that showed stage I diastolic dysfunction.      Review of Systems   Constitutional: Positive for fatigue. Negative for activity change, diaphoresis and fever.   HENT: Negative for congestion, ear pain, postnasal drip, sinus pressure, sore throat and trouble swallowing.    Eyes: Negative for pain.   Respiratory: Negative for cough, chest tightness, shortness of breath and wheezing.    Cardiovascular: Negative for chest pain, palpitations and leg swelling.   Gastrointestinal: Negative for abdominal distention, abdominal pain, blood in stool, constipation and diarrhea.   Endocrine: Negative for cold intolerance and heat intolerance.   Genitourinary: Negative for decreased urine volume, difficulty urinating, dysuria, flank pain, frequency and hematuria.   Musculoskeletal: Negative for arthralgias, back pain, joint swelling, myalgias and neck pain.   Skin: Negative for pallor, rash and wound.   Neurological:  Negative for tremors, syncope, weakness, light-headedness, numbness and headaches.   Hematological: Negative for adenopathy.   Psychiatric/Behavioral: Negative for confusion, decreased concentration, hallucinations, self-injury, sleep disturbance and suicidal ideas. The patient is not nervous/anxious.        Past Medical History:   Diagnosis Date    Arthritis     Atelectasis of left lung 07/2015    Bradycardia 07/17/2015    Calcium blood increased 08/09/2016    Depression     Diastolic dysfunction without heart failure 07/2015    Elevated BUN 08/09/2016    Fatigue 04/29/2015    Hyperglycemia 08/09/2016    Hyperlipidemia     Migraine without aura 10/2015    Prehypertension 11/2016    Testosterone deficiency 04/2015    Thyroid disease        Past Surgical History:   Procedure Laterality Date    ACHILLES TENDON SURGERY      APPENDECTOMY      DUPUYTREN CONTRACTURE RELEASE  2014    LUMBAR EPIDURAL INJECTION  2013    PROSTATE SURGERY      SACROILIAC JOINT INJECTION      TONSILLECTOMY      TRANSURETHRAL RESECTION OF PROSTATE         Family History   Problem Relation Age of Onset    COPD Father        Social History     Social History    Marital status:      Spouse name: N/A    Number of children: N/A    Years of education: N/A     Social History Main Topics    Smoking status: Former Smoker     Quit date: 7/23/1972    Smokeless tobacco: Never Used    Alcohol use Yes      Comment: occassional    Drug use: No    Sexual activity: Not Asked     Other Topics Concern    None     Social History Narrative    None       Current Outpatient Prescriptions   Medication Sig Dispense Refill    buPROPion (WELLBUTRIN) 75 MG tablet Take 2 tablets (150 mg total) by mouth 3 (three) times daily. 2 Tablet  tablet 3    CALCIUM CITRATE/VITAMIN D3 (CITRACAL + D ORAL) Take by mouth. Taking 2 tablets by mouth daily      glucosamine-chondroit-vit C-Mn (GLUCOSAMINE CHONDROITIN MAXSTR) 500-400 mg Cap  Take 1 tablet by mouth 3 (three) times daily. 3 Capsule Oral Every day      loratadine (CLARITIN) 10 mg tablet Take 10 mg by mouth once daily.      naproxen sodium (ANAPROX) 220 MG tablet Take 220 mg by mouth 2 (two) times daily with meals. 1 Tablet Oral Twice a day       omega-3 fatty acids 1,000 mg Cap Take by mouth once daily. 1 Capsule Oral Every day      simvastatin (ZOCOR) 20 MG tablet Take 1 tablet (20 mg total) by mouth once daily. 90 tablet 3    sumatriptan (IMITREX) 100 MG tablet TAKE 1 TABLET  EVERY  2  HOURS AS NEEDED 81 tablet 3    UNABLE TO FIND Tumeric 1500mg by mouth once daily      losartan (COZAAR) 25 MG tablet Take 1 tablet (25 mg total) by mouth once daily. 90 tablet 3     No current facility-administered medications for this visit.        Review of patient's allergies indicates:   Allergen Reactions    Demerol  [meperidine]      Agitation  Other reaction(s): Unknown       Objective:      Physical Exam   Constitutional: He is oriented to person, place, and time. He appears well-developed and well-nourished. No distress.   HENT:   Head: Normocephalic and atraumatic.   Right Ear: External ear normal.   Left Ear: External ear normal.   Nose: Nose normal.   Mouth/Throat: Oropharynx is clear and moist.   Eyes: Conjunctivae and EOM are normal. Right eye exhibits no discharge. Left eye exhibits no discharge. No scleral icterus.   Neck: Normal range of motion. Neck supple. No JVD present. No tracheal deviation present. No thyromegaly present.   Cardiovascular: Normal rate, regular rhythm, normal heart sounds and intact distal pulses.  Exam reveals no gallop.    No murmur heard.  Pulmonary/Chest: Effort normal and breath sounds normal. No respiratory distress. He has no wheezes. He has no rales.   Abdominal: Soft. Bowel sounds are normal. He exhibits no distension and no mass. There is no tenderness.   Musculoskeletal: Normal range of motion. He exhibits no edema or tenderness.   Lymphadenopathy:      He has no cervical adenopathy.   Neurological: He is alert and oriented to person, place, and time.   Skin: Skin is warm and dry. No rash noted. He is not diaphoretic. No erythema.   Psychiatric: He has a normal mood and affect. His behavior is normal. Judgment and thought content normal.       Lab Results   Component Value Date    WBC 6.0 05/26/2017    HGB 14.8 05/26/2017    HCT 43.4 05/26/2017     05/26/2017    CHOL 166 08/12/2013    TRIG 68 08/12/2013    HDL 59 08/12/2013    ALT 23 08/12/2013    AST 39 11/22/2017     11/22/2017    K 4.3 11/22/2017     11/22/2017    CREATININE 1.07 11/22/2017    BUN 20 11/22/2017    CO2 28.7 11/22/2017    TSH 2.64 05/26/2017    PSA 1.75 08/12/2013    HGBA1C 5.6 05/26/2017     Assessment:       1. Familial hypercholesterolemia    2. Hyperparathyroidism    3. Diastolic dysfunction without heart failure    4. Fatigue, unspecified type    5. Essential hypertension        Plan:       Familial hypercholesterolemia  -     Comprehensive metabolic panel; Future; Expected date: 06/07/2018  -     Lipid panel; Future; Expected date: 06/07/2018    Hyperparathyroidism  -     PTH, intact; Future; Expected date: 06/07/2018    Diastolic dysfunction without heart failure  -     losartan (COZAAR) 25 MG tablet; Take 1 tablet (25 mg total) by mouth once daily.  Dispense: 90 tablet; Refill: 3    Fatigue, unspecified type  -     CBC auto differential; Future; Expected date: 06/07/2018    Essential hypertension  -     TSH; Future; Expected date: 06/07/2018  -     T4, free; Future; Expected date: 06/07/2018  -     Urinalysis Microscopic; Future; Expected date: 06/07/2018

## 2018-06-07 NOTE — PATIENT INSTRUCTIONS
"  Discharge Instructions: Taking Blood Pressure Medications  You have been diagnosed with high blood pressure (hypertension). Diet and exercise help control high blood pressure. Many people also need the help of one or more medicines. Here are the main types of blood pressure medicines and how they work.  Diuretics  Diuretics are sometimes called "water pills" because they work in the kidney and flush excess water and sodium (salt) from the body. These are one of the most common and  important medicines for the management of blood pressure.  Beta-blockers  Beta-blockers reduce nerve impulses to the heart and blood vessels. This makes the heart beat slower and with less force. Your blood pressure drops, and your heart does not have to work as hard, which may improve pumping function.  ACE inhibitors  ACE inhibitors cause the vessels to relax. This helps the blood flow better and lowers blood pressure.  Angiotensin antagonists  Angiotensin antagonists shield blood vessels from a hormone that causes the blood vessels to get stiff and narrow. Your vessels become wider, and your blood pressure goes down.  Calcium channel blockers (CCBs)  CCBs keep calcium from entering the muscle cells of the heart and blood vessels. This causes blood vessels to relax, and your blood pressure goes down.  Alpha-blockers  Alpha-blockers reduce nerve impulses to blood vessels. This allows blood to pass easily, causing blood pressure to go down.  Alpha-beta blockers  Alpha-beta blockers work the same way as alpha-blockers but also slow your heartbeat. As a result, less blood is pumped through your blood vessels and your blood pressure goes down.  Vasodilators  Vasodilators directly open blood vessels by relaxing the muscle in the vessel walls, causing blood pressure to go down.  Date Last Reviewed: 4/27/2016  © 4929-8873 [employer]. [address] [place] [address], [place] [address]. All rights reserved. This information is not " intended as a substitute for professional medical care. Always follow your healthcare professional's instructions.

## 2018-06-11 LAB
ALBUMIN SERPL-MCNC: 4 G/DL (ref 3.1–4.7)
ALP SERPL-CCNC: 52 IU/L (ref 40–104)
ALT (SGPT): 27 IU/L (ref 3–33)
AST SERPL-CCNC: 35 IU/L (ref 10–40)
BASOPHILS NFR BLD: 0.1 K/UL (ref 0–0.2)
BASOPHILS NFR BLD: 1 %
BILIRUB SERPL-MCNC: 0.4 MG/DL (ref 0.3–1)
BUN SERPL-MCNC: 20 MG/DL (ref 8–20)
CALCIUM SERPL-MCNC: 9.2 MG/DL (ref 7.7–10.4)
CHLORIDE: 103 MMOL/L (ref 98–110)
CO2 SERPL-SCNC: 29.5 MMOL/L (ref 22.8–31.6)
CREATININE: 0.97 MG/DL (ref 0.6–1.4)
EOSINOPHIL NFR BLD: 0.2 K/UL (ref 0–0.7)
EOSINOPHIL NFR BLD: 2.7 %
ERYTHROCYTE [DISTWIDTH] IN BLOOD BY AUTOMATED COUNT: 12.7 % (ref 11.7–14.9)
GLUCOSE: 100 MG/DL (ref 70–99)
GRAN #: 3.7 K/UL (ref 1.4–6.5)
GRAN%: 51.9 %
HCT VFR BLD AUTO: 45.2 % (ref 39–55)
HGB BLD-MCNC: 14.9 G/DL (ref 14–16)
IMMATURE GRANS (ABS): 0 K/UL (ref 0–1)
IMMATURE GRANULOCYTES: 0.3 %
LYMPH #: 2.4 K/UL (ref 1.2–3.4)
LYMPH%: 33.8 %
MCH RBC QN AUTO: 30.8 PG (ref 25–35)
MCHC RBC AUTO-ENTMCNC: 33 G/DL (ref 31–36)
MCV RBC AUTO: 93.6 FL (ref 80–100)
MONO #: 0.7 K/UL (ref 0.1–0.6)
MONO%: 10.3 %
NUCLEATED RBCS: 0 %
PLATELET # BLD AUTO: 230 K/UL (ref 140–440)
PMV BLD AUTO: 9 FL (ref 8.8–12.7)
POTASSIUM SERPL-SCNC: 4.1 MMOL/L (ref 3.5–5)
PROT SERPL-MCNC: 7.2 G/DL (ref 6–8.2)
RBC # BLD AUTO: 4.83 M/UL (ref 4.3–5.9)
SODIUM: 140 MMOL/L (ref 134–144)
T4 FREE SP9 P CHAL SERPL-SCNC: 0.69 NG/DL (ref 0.45–1.27)
TSH SERPL DL<=0.005 MIU/L-ACNC: 5.18 ULU/ML (ref 0.3–5.6)
WBC # BLD AUTO: 7.1 K/UL (ref 5–10)

## 2018-06-21 ENCOUNTER — TELEPHONE (OUTPATIENT)
Dept: FAMILY MEDICINE | Facility: CLINIC | Age: 78
End: 2018-06-21

## 2018-06-21 NOTE — TELEPHONE ENCOUNTER
----- Message from Henrietta Lovell MD sent at 6/14/2018  7:21 AM CDT -----  Intact PTH and other labs look good. He is however is low normal on thyroid - I would offer him a very low dose of thyroid supplement which may help his fatigue?

## 2018-06-22 ENCOUNTER — TELEPHONE (OUTPATIENT)
Dept: FAMILY MEDICINE | Facility: CLINIC | Age: 78
End: 2018-06-22

## 2018-06-22 NOTE — TELEPHONE ENCOUNTER
2nd attempt to notify pt. LVM with info. Asked pt to call back and let us know if he wants a low dose of thyroid med to possible help fatigue.

## 2018-06-25 ENCOUNTER — TELEPHONE (OUTPATIENT)
Dept: FAMILY MEDICINE | Facility: CLINIC | Age: 78
End: 2018-06-25

## 2018-06-25 DIAGNOSIS — E03.9 ACQUIRED HYPOTHYROIDISM: Primary | ICD-10-CM

## 2018-06-25 NOTE — TELEPHONE ENCOUNTER
Pt called and states he got msg re: results.  States he would like to try the thyroid medication if you can send to his local pharmacy [facility].

## 2018-06-25 NOTE — TELEPHONE ENCOUNTER
Notified pt  is out of the office this week but she will take care of the thyroid med request upon her return to the office next week. Pt verbalized understanding.

## 2018-07-02 RX ORDER — LEVOTHYROXINE SODIUM 25 UG/1
25 TABLET ORAL DAILY
Qty: 30 TABLET | Refills: 11 | Status: SHIPPED | OUTPATIENT
Start: 2018-07-02 | End: 2018-08-27 | Stop reason: SDUPTHER

## 2018-08-28 RX ORDER — LEVOTHYROXINE SODIUM 25 UG/1
25 TABLET ORAL DAILY
Qty: 90 TABLET | Refills: 3 | Status: SHIPPED | OUTPATIENT
Start: 2018-08-28 | End: 2019-03-05

## 2018-12-10 ENCOUNTER — OFFICE VISIT (OUTPATIENT)
Dept: FAMILY MEDICINE | Facility: CLINIC | Age: 78
End: 2018-12-10
Payer: MEDICARE

## 2018-12-10 VITALS — BODY MASS INDEX: 27.58 KG/M2 | HEIGHT: 74 IN | RESPIRATION RATE: 16 BRPM

## 2018-12-10 DIAGNOSIS — E03.8 SUBCLINICAL HYPOTHYROIDISM: ICD-10-CM

## 2018-12-10 DIAGNOSIS — E78.01 FAMILIAL HYPERCHOLESTEROLEMIA: ICD-10-CM

## 2018-12-10 DIAGNOSIS — I10 ESSENTIAL HYPERTENSION: Primary | ICD-10-CM

## 2018-12-10 PROBLEM — E07.9 THYROID DISEASE: Status: ACTIVE | Noted: 2018-12-10

## 2018-12-10 LAB
ALBUMIN SERPL-MCNC: 3.9 G/DL (ref 3.1–4.7)
ALP SERPL-CCNC: 60 IU/L (ref 40–104)
ALT (SGPT): 26 IU/L (ref 3–33)
AST SERPL-CCNC: 31 IU/L (ref 10–40)
BILIRUB SERPL-MCNC: 0.5 MG/DL (ref 0.3–1)
BUN SERPL-MCNC: 21 MG/DL (ref 8–20)
CALCIUM SERPL-MCNC: 9.4 MG/DL (ref 7.7–10.4)
CHLORIDE: 100 MMOL/L (ref 98–110)
CO2 SERPL-SCNC: 28.5 MMOL/L (ref 22.8–31.6)
CREATININE: 1.11 MG/DL (ref 0.6–1.4)
GLUCOSE: 103 MG/DL (ref 70–99)
POTASSIUM SERPL-SCNC: 4.6 MMOL/L (ref 3.5–5)
PROT SERPL-MCNC: 7.3 G/DL (ref 6–8.2)
SODIUM: 137 MMOL/L (ref 134–144)
T4 FREE SP9 P CHAL SERPL-SCNC: 0.78 NG/DL (ref 0.45–1.27)
TSH SERPL DL<=0.005 MIU/L-ACNC: 3.33 ULU/ML (ref 0.3–5.6)

## 2018-12-10 PROCEDURE — 99213 OFFICE O/P EST LOW 20 MIN: CPT | Mod: ,,, | Performed by: INTERNAL MEDICINE

## 2018-12-10 RX ORDER — LOSARTAN POTASSIUM 50 MG/1
50 TABLET ORAL DAILY
Qty: 90 TABLET | Refills: 3 | Status: SHIPPED | OUTPATIENT
Start: 2018-12-10 | End: 2019-10-15 | Stop reason: SDUPTHER

## 2018-12-10 NOTE — PATIENT INSTRUCTIONS
Hypothyroidism       You have hypothyroidism. This means your thyroid gland is not making enough thyroid hormone. This hormone is vital to body growth and metabolism. If you dont make enough, many body processes slow down. This can cause symptoms throughout the body. Hypothyroidism can range from mild to severe. The most severe form is called myxedema.  There are a number of causes of hypothyroidism. A common cause is Hashimotos disease. This disease causes the bodys own immune system to attack the thyroid gland. When you have certain treatments, such as surgery to remove the thyroid gland, this can also cause hypothyroidism.  Symptoms of hypothyroidism can include:  · Fatigue  · Trouble concentrating or thinking clearly; forgetfulness  · Dry skin  · Hair loss  · Weight gain  · Low tolerance to cold  · Constipation  · Depression  · Personality changes  · Tingling or prickling of the hands or feet  · Heavy, absent, or irregular periods (women only)  Older adults may sometimes have other symptoms. These can include:  · Muscle aches and weakness  · Confusion  · Incontinence (unable to control urine or stool)  · Trouble moving around  · Falling  Treatment for hypothyroidism involves taking thyroid hormone pills daily. These pills replace the hormone your thyroid doesnt make. You will likely need to take a daily pill for the rest of your life. Tips for taking this medicine are given below.  Home care  Tips for taking your medicine  · Take your thyroid hormone pills as prescribed by your healthcare provider. This is most often 1 pill a day on an empty stomach. Use a pillbox labeled with the days of the week. This will help you remember to take your pill each day.  · Dont take products that contain iron and calcium or antacids within 4 hours of taking your thyroid hormone pills.  · Dont take other medicines with your thyroid hormone pill without checking with your provider first.  · Tell your provider if you have  any side effects from your medicines that bother you.  · Never change the dosage or stop taking your thyroid pills without talking to your provider first.  General care  · Always talk with your provider before trying other medicines or treatments for your thyroid problem.  · If you see other healthcare providers, be sure to let them know about your thyroid problem.  Follow-up care  See your healthcare provider for checkups as advised. You may need regular tests to check the level of thyroid hormone in your blood.  When to seek medical advice  Call your healthcare provider right away if any of these occur:  · New symptoms develop  · Symptoms return, continue, or worsen even after treatment  · Extreme fatigue  · Puffy hands, face, or feet  · Fast or irregular heartbeat  · Confusion  Call 911  Call 911 right away if any of these occur:  · Fainting  · Chest pain  · Shortness of breath or trouble breathing  Date Last Reviewed: 8/24/2015  © 5978-3717 [employer]. [address] [place] [address], [place] [address]. All rights reserved. This information is not intended as a substitute for professional medical care. Always follow your healthcare professional's instructions.

## 2018-12-10 NOTE — PROGRESS NOTES
Subjective:       Patient ID: Gilberto Lee is a 78 y.o. male.    Chief Complaint: Follow-up (htn) and Thyroid Problem    78-year-old gentleman is here for follow-up on his hypertension and new onset subclinical hypothyroidism. The patient does see an improvement in his overall fatigue level. He's been on levothyroxine 25 µg for 6 months now. 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 He is on losartan 25 mg and was taking in the morning with some mild peaks of blood pressure 150/90 in the a.m. once but twice a month. He then proceeded to take it in the evening and his morning blood pressures have been better. He denies any headache or any symptoms of shortness of breath or chest pain.       Review of Systems   Constitutional: Positive for fatigue. Negative for activity change, diaphoresis and fever.   HENT: Negative for congestion, ear pain, postnasal drip, sinus pressure, sore throat and trouble swallowing.    Eyes: Negative for pain.   Respiratory: Negative for cough, chest tightness, shortness of breath and wheezing.    Cardiovascular: Negative for chest pain, palpitations and leg swelling.   Gastrointestinal: Negative for abdominal distention, abdominal pain, blood in stool, constipation and diarrhea.   Endocrine: Negative for cold intolerance and heat intolerance.   Genitourinary: Negative for decreased urine volume, difficulty urinating, dysuria, flank pain, frequency and hematuria.   Musculoskeletal: Negative for arthralgias, back pain, joint swelling, myalgias and neck pain.   Skin: Negative for pallor, rash and wound.   Neurological: Negative for tremors, syncope, weakness, light-headedness, numbness and headaches.   Hematological: Negative for adenopathy.   Psychiatric/Behavioral: Negative for confusion, decreased concentration, hallucinations, self-injury, sleep  disturbance and suicidal ideas. The patient is not nervous/anxious.        Objective:      Physical Exam   Constitutional: He is oriented to person, place, and time. He appears well-developed and well-nourished. No distress.   HENT:   Head: Normocephalic and atraumatic.   Right Ear: External ear normal.   Left Ear: External ear normal.   Nose: Nose normal.   Mouth/Throat: Oropharynx is clear and moist.   Eyes: Conjunctivae and EOM are normal. Right eye exhibits no discharge. Left eye exhibits no discharge. No scleral icterus.   Neck: Normal range of motion. Neck supple. No JVD present. No tracheal deviation present. No thyromegaly present.   Cardiovascular: Normal rate, regular rhythm, normal heart sounds and intact distal pulses. Exam reveals no gallop.   No murmur heard.  Pulmonary/Chest: Effort normal and breath sounds normal. No respiratory distress. He has no wheezes. He has no rales.   Abdominal: Soft. Bowel sounds are normal. He exhibits no distension and no mass. There is no tenderness.   Musculoskeletal: Normal range of motion. He exhibits no edema or tenderness.   Lymphadenopathy:     He has no cervical adenopathy.   Neurological: He is alert and oriented to person, place, and time.   Skin: Skin is warm and dry. No rash noted. He is not diaphoretic. No erythema.   Psychiatric: He has a normal mood and affect. His behavior is normal. Judgment and thought content normal.       Assessment:       1. Essential hypertension    2. Subclinical hypothyroidism    3. Familial hypercholesterolemia        Plan:       Essential hypertension  -     Comprehensive metabolic panel; Future; Expected date: 12/10/2018  -     losartan (COZAAR) 50 MG tablet; Take 1 tablet (50 mg total) by mouth once daily.  Dispense: 90 tablet; Refill: 3    Subclinical hypothyroidism  -     TSH; Future; Expected date: 12/10/2018  -     T4, free; Future; Expected date: 12/10/2018    Familial hypercholesterolemia-continue statin as patient follows  up in 6 months he will be due for all of his routine blood work.    Other orders  -     Estimated Glomerular Filtration Rate

## 2018-12-12 ENCOUNTER — PATIENT MESSAGE (OUTPATIENT)
Dept: FAMILY MEDICINE | Facility: CLINIC | Age: 78
End: 2018-12-12

## 2018-12-12 DIAGNOSIS — E07.9 THYROID DISEASE: Primary | ICD-10-CM

## 2018-12-12 RX ORDER — LEVOTHYROXINE SODIUM 50 UG/1
50 TABLET ORAL DAILY
Qty: 90 TABLET | Refills: 0 | Status: SHIPPED | OUTPATIENT
Start: 2018-12-12 | End: 2019-03-01 | Stop reason: SDUPTHER

## 2018-12-12 NOTE — TELEPHONE ENCOUNTER
Pt states in pt portal msg he would like to take your suggestion to increase his thyroid med from 25mcg to 50mcg. Can you write the new prescription. It is pended.

## 2019-02-21 DIAGNOSIS — R51.9 GENERALIZED HEADACHES: ICD-10-CM

## 2019-02-21 DIAGNOSIS — E78.00 HYPERCHOLESTEREMIA: ICD-10-CM

## 2019-02-21 RX ORDER — SIMVASTATIN 20 MG/1
TABLET, FILM COATED ORAL
Qty: 90 TABLET | Refills: 3 | Status: SHIPPED | OUTPATIENT
Start: 2019-02-21 | End: 2019-10-15 | Stop reason: SDUPTHER

## 2019-02-21 RX ORDER — SUMATRIPTAN SUCCINATE 100 MG/1
TABLET ORAL
Qty: 81 TABLET | Refills: 0 | Status: SHIPPED | OUTPATIENT
Start: 2019-02-21 | End: 2019-02-26 | Stop reason: SDUPTHER

## 2019-02-21 RX ORDER — BUPROPION HYDROCHLORIDE 75 MG/1
TABLET ORAL
Qty: 540 TABLET | Refills: 2 | Status: SHIPPED | OUTPATIENT
Start: 2019-02-21 | End: 2019-10-15 | Stop reason: SDUPTHER

## 2019-02-26 DIAGNOSIS — R51.9 GENERALIZED HEADACHES: ICD-10-CM

## 2019-02-26 RX ORDER — SUMATRIPTAN SUCCINATE 100 MG/1
100 TABLET ORAL DAILY PRN
Qty: 9 TABLET | Refills: 3 | Status: SHIPPED | OUTPATIENT
Start: 2019-02-26 | End: 2019-10-15 | Stop reason: SDUPTHER

## 2019-03-05 RX ORDER — LEVOTHYROXINE SODIUM 50 UG/1
TABLET ORAL
Qty: 90 TABLET | Refills: 0 | Status: SHIPPED | OUTPATIENT
Start: 2019-03-05 | End: 2019-06-01 | Stop reason: SDUPTHER

## 2019-03-06 LAB
T4 FREE SP9 P CHAL SERPL-SCNC: 1.04 NG/DL (ref 0.45–1.27)
TSH SERPL DL<=0.005 MIU/L-ACNC: 2.57 ULU/ML (ref 0.3–5.6)

## 2019-06-03 RX ORDER — LEVOTHYROXINE SODIUM 50 UG/1
TABLET ORAL
Qty: 90 TABLET | Refills: 0 | Status: SHIPPED | OUTPATIENT
Start: 2019-06-03 | End: 2019-08-26 | Stop reason: SDUPTHER

## 2019-06-10 ENCOUNTER — OFFICE VISIT (OUTPATIENT)
Dept: FAMILY MEDICINE | Facility: CLINIC | Age: 79
End: 2019-06-10
Payer: MEDICARE

## 2019-06-10 VITALS
SYSTOLIC BLOOD PRESSURE: 128 MMHG | RESPIRATION RATE: 16 BRPM | WEIGHT: 202 LBS | TEMPERATURE: 98 F | OXYGEN SATURATION: 95 % | HEIGHT: 74 IN | HEART RATE: 64 BPM | BODY MASS INDEX: 25.93 KG/M2 | DIASTOLIC BLOOD PRESSURE: 74 MMHG

## 2019-06-10 DIAGNOSIS — G43.009 MIGRAINE WITHOUT AURA AND RESPONSIVE TO TREATMENT: ICD-10-CM

## 2019-06-10 DIAGNOSIS — R53.83 OTHER FATIGUE: ICD-10-CM

## 2019-06-10 DIAGNOSIS — I10 ESSENTIAL HYPERTENSION: Primary | ICD-10-CM

## 2019-06-10 DIAGNOSIS — E03.8 SUBCLINICAL HYPOTHYROIDISM: ICD-10-CM

## 2019-06-10 DIAGNOSIS — E78.01 FAMILIAL HYPERCHOLESTEROLEMIA: ICD-10-CM

## 2019-06-10 PROCEDURE — 99213 OFFICE O/P EST LOW 20 MIN: CPT | Mod: ,,, | Performed by: INTERNAL MEDICINE

## 2019-06-10 PROCEDURE — 99213 PR OFFICE/OUTPT VISIT, EST, LEVL III, 20-29 MIN: ICD-10-PCS | Mod: ,,, | Performed by: INTERNAL MEDICINE

## 2019-06-10 NOTE — PROGRESS NOTES
Subjective:       Patient ID: Gilberto Lee is a 78 y.o. male.    Chief Complaint: Annual Exam    78-year-old gentleman who is here for annual physical/checkup for his chronic-controlled medical issues of hypothyroidism, hypertension, chronic migraines as well as mild dysthymia. He is doing well on his present medication regimen and we did discuss him being on a triptan for migraines and how it somewhat contraindicated in patients over 60. He only takes 25 mg when he takes it approximately twice a week and has been doing this for quite some time.    Review of Systems   Constitutional: Negative for activity change, diaphoresis, fatigue and fever.   HENT: Negative for congestion, ear pain, postnasal drip, sinus pressure, sore throat and trouble swallowing.    Eyes: Negative for pain.   Respiratory: Negative for cough, chest tightness, shortness of breath and wheezing.    Cardiovascular: Negative for chest pain, palpitations and leg swelling.   Gastrointestinal: Negative for abdominal distention, abdominal pain, blood in stool, constipation and diarrhea.   Endocrine: Negative for cold intolerance and heat intolerance.   Genitourinary: Negative for decreased urine volume, difficulty urinating, dysuria, flank pain, frequency and hematuria.   Musculoskeletal: Negative for arthralgias, back pain, joint swelling, myalgias and neck pain.   Skin: Negative for pallor, rash and wound.   Neurological: Negative for tremors, syncope, weakness, light-headedness, numbness and headaches.   Hematological: Negative for adenopathy.   Psychiatric/Behavioral: Negative for confusion, decreased concentration, hallucinations, self-injury, sleep disturbance and suicidal ideas. The patient is not nervous/anxious.        Past Medical History:   Diagnosis Date    Arthritis     Atelectasis of left lung 07/2015    Bradycardia 07/17/2015    Calcium blood increased 08/09/2016    Depression     Diastolic dysfunction without heart failure  2015    Elevated BUN 2016    Fatigue 2015    Hyperglycemia 2016    Hyperlipidemia     Migraine without aura 10/2015    Prehypertension 2016    Testosterone deficiency 2015    Thyroid disease        Past Surgical History:   Procedure Laterality Date    ACHILLES TENDON SURGERY      APPENDECTOMY      DEPUYTRENS RELEASE-left small Left 10/31/2014    [facility]    DUPUYTREN CONTRACTURE RELEASE  2014    INJECTION, JOINT Right 10/10/2013    [facility]    INJECTION, JOINT, SHOULDER, HIP, OR KNEE Right 2013    [facility] at [facility] OR    INJECTION-JOINT N/A 2014    [facility]    LUMBAR EPIDURAL INJECTION      PROSTATE SURGERY      SACROILIAC JOINT INJECTION      TONSILLECTOMY      TRANSURETHRAL RESECTION OF PROSTATE         Family History   Problem Relation Age of Onset    COPD Father        Social History     Socioeconomic History    Marital status:      Spouse name: Not on file    Number of children: Not on file    Years of education: Not on file    Highest education level: Not on file   Occupational History    Not on file   Social Needs    Financial resource strain: Not on file    Food insecurity:     Worry: Not on file     Inability: Not on file    Transportation needs:     Medical: Not on file     Non-medical: Not on file   Tobacco Use    Smoking status: Former Smoker     Last attempt to quit: 1972     Years since quittin.9    Smokeless tobacco: Never Used   Substance and Sexual Activity    Alcohol use: Yes     Comment: occassional    Drug use: No    Sexual activity: Yes   Lifestyle    Physical activity:     Days per week: Not on file     Minutes per session: Not on file    Stress: Not at all   Relationships    Social connections:     Talks on phone: Not on file     Gets together: Not on file      Attends Islam service: Not on file     Active member of club or organization: Not on file     Attends meetings of clubs or organizations: Not on file     Relationship status: Not on file   Other Topics Concern    Not on file   Social History Narrative    Not on file       Current Outpatient Medications   Medication Sig Dispense Refill    buPROPion (WELLBUTRIN) 75 MG tablet TAKE 2 TABLETS BY MOUTH 3 TIMES DAILY 540 tablet 2    CALCIUM CITRATE/VITAMIN D3 (CITRACAL + D ORAL) Take by mouth. Taking 2 tablets by mouth daily      glucosamine-chondroit-vit C-Mn (GLUCOSAMINE CHONDROITIN MAXSTR) 500-400 mg Cap Take 1 tablet by mouth 3 (three) times daily. 3 Capsule Oral Every day      levothyroxine (SYNTHROID) 50 MCG tablet TAKE 1 TABLET BY MOUTH EVERY DAY 90 tablet 0    loratadine (CLARITIN) 10 mg tablet Take 10 mg by mouth once daily.      losartan (COZAAR) 50 MG tablet Take 1 tablet (50 mg total) by mouth once daily. 90 tablet 3    naproxen sodium (ANAPROX) 220 MG tablet Take 220 mg by mouth 2 (two) times daily with meals. 1 Tablet Oral Twice a day       omega-3 fatty acids 1,000 mg Cap Take by mouth once daily. 1 Capsule Oral Every day      simvastatin (ZOCOR) 20 MG tablet TAKE 1 TABLET BY MOUTH ONCE DAILY. 90 tablet 3    sumatriptan (IMITREX) 100 MG tablet Take 1 tablet (100 mg total) by mouth daily as needed for Migraine. 9 tablet 3     No current facility-administered medications for this visit.        Review of patient's allergies indicates:   Allergen Reactions    Demerol  [meperidine]      Agitation  Other reaction(s): Unknown     Objective:      Physical Exam   Constitutional: He is oriented to person, place, and time. He appears well-developed and well-nourished. No distress.   HENT:   Head: Normocephalic and atraumatic.   Right Ear: External ear normal.   Left Ear: External ear normal.   Nose: Nose normal.   Mouth/Throat: Oropharynx is clear and moist.   Eyes: Conjunctivae and EOM are normal.  Right eye exhibits no discharge. Left eye exhibits no discharge. No scleral icterus.   Neck: Normal range of motion. Neck supple. No JVD present. No tracheal deviation present. No thyromegaly present.   Cardiovascular: Normal rate, regular rhythm, normal heart sounds and intact distal pulses. Exam reveals no gallop.   No murmur heard.  Pulmonary/Chest: Effort normal and breath sounds normal. No respiratory distress. He has no wheezes. He has no rales.   Abdominal: Soft. Bowel sounds are normal. He exhibits no distension and no mass. There is no tenderness.   Musculoskeletal: Normal range of motion. He exhibits no edema or tenderness.   Lymphadenopathy:     He has no cervical adenopathy.   Neurological: He is alert and oriented to person, place, and time.   Skin: Skin is warm and dry. No rash noted. He is not diaphoretic. No erythema.   Psychiatric: He has a normal mood and affect. His behavior is normal. Judgment and thought content normal.       Lab Results   Component Value Date    WBC 7.1 06/11/2018    HGB 14.9 06/11/2018    HCT 45.2 06/11/2018     06/11/2018    CHOL 166 08/12/2013    TRIG 68 08/12/2013    HDL 59 08/12/2013    ALT 23 08/12/2013    AST 31 12/10/2018     12/10/2018    K 4.6 12/10/2018     12/10/2018    CREATININE 1.11 12/10/2018    BUN 21 (H) 12/10/2018    CO2 28.5 12/10/2018    TSH 2.57 03/06/2019    PSA 1.75 08/12/2013    HGBA1C 5.6 05/26/2017       Assessment:       1. Essential hypertension    2. Subclinical hypothyroidism    3. Familial hypercholesterolemia    4. Migraine without aura and responsive to treatment    5. Other fatigue        Plan:       Essential hypertension   Controlled on above med regimen to include an ARB/ACE    Subclinical hypothyroidism  -     TSH; Future; Expected date: 06/10/2019  -     T4, free; Future; Expected date: 06/10/2019    Familial hypercholesterolemia  -     Lipid panel; Future; Expected date: 06/10/2019  -     Comprehensive metabolic panel;  Future; Expected date: 06/10/2019    Migraine without aura and responsive to treatment   Ok, to continue tryptan     Other fatigue  -     CBC auto differential; Future; Expected date: 06/10/2019

## 2019-06-11 LAB
ALBUMIN SERPL-MCNC: 4.1 G/DL (ref 3.1–4.7)
ALP SERPL-CCNC: 46 IU/L (ref 40–104)
ALT (SGPT): 25 IU/L (ref 3–33)
AST SERPL-CCNC: 35 IU/L (ref 10–40)
BASOPHILS NFR BLD: 0.1 K/UL (ref 0–0.2)
BASOPHILS NFR BLD: 1.2 %
BILIRUB SERPL-MCNC: 0.7 MG/DL (ref 0.3–1)
BUN SERPL-MCNC: 25 MG/DL (ref 8–20)
CALCIUM SERPL-MCNC: 9.4 MG/DL (ref 7.7–10.4)
CHLORIDE: 105 MMOL/L (ref 98–110)
CO2 SERPL-SCNC: 27.3 MMOL/L (ref 22.8–31.6)
CREATININE: 1.03 MG/DL (ref 0.6–1.4)
EOSINOPHIL NFR BLD: 0.2 K/UL (ref 0–0.7)
EOSINOPHIL NFR BLD: 2.5 %
ERYTHROCYTE [DISTWIDTH] IN BLOOD BY AUTOMATED COUNT: 12.6 % (ref 11.7–14.9)
GLUCOSE: 105 MG/DL (ref 70–99)
GRAN #: 3.8 K/UL (ref 1.4–6.5)
GRAN%: 55.6 %
HCT VFR BLD AUTO: 43.9 % (ref 39–55)
HGB BLD-MCNC: 14.8 G/DL (ref 14–16)
IMMATURE GRANS (ABS): 0 K/UL (ref 0–1)
IMMATURE GRANULOCYTES: 0.1 %
LYMPH #: 2.1 K/UL (ref 1.2–3.4)
LYMPH%: 30.1 %
MCH RBC QN AUTO: 31.2 PG (ref 25–35)
MCHC RBC AUTO-ENTMCNC: 33.7 G/DL (ref 31–36)
MCV RBC AUTO: 92.6 FL (ref 80–100)
MONO #: 0.7 K/UL (ref 0.1–0.6)
MONO%: 10.5 %
NUCLEATED RBCS: 0 %
PLATELET # BLD AUTO: 255 K/UL (ref 140–440)
PMV BLD AUTO: 8.9 FL (ref 8.8–12.7)
POTASSIUM SERPL-SCNC: 4 MMOL/L (ref 3.5–5)
PROT SERPL-MCNC: 7.3 G/DL (ref 6–8.2)
RBC # BLD AUTO: 4.74 M/UL (ref 4.3–5.9)
SODIUM: 140 MMOL/L (ref 134–144)
T4 FREE SP9 P CHAL SERPL-SCNC: 0.86 NG/DL (ref 0.45–1.27)
TSH SERPL DL<=0.005 MIU/L-ACNC: 3.11 ULU/ML (ref 0.3–5.6)
WBC # BLD AUTO: 6.8 K/UL (ref 5–10)

## 2019-08-26 RX ORDER — LEVOTHYROXINE SODIUM 50 UG/1
50 TABLET ORAL DAILY
Qty: 90 TABLET | Refills: 0 | Status: SHIPPED | OUTPATIENT
Start: 2019-08-26 | End: 2019-10-15 | Stop reason: SDUPTHER

## 2019-10-10 ENCOUNTER — IMMUNIZATION (OUTPATIENT)
Dept: FAMILY MEDICINE | Facility: CLINIC | Age: 79
End: 2019-10-10
Payer: MEDICARE

## 2019-10-10 PROCEDURE — 90662 IIV NO PRSV INCREASED AG IM: CPT | Mod: PBBFAC,PO

## 2019-10-15 DIAGNOSIS — I10 ESSENTIAL HYPERTENSION: ICD-10-CM

## 2019-10-15 DIAGNOSIS — R51.9 GENERALIZED HEADACHES: ICD-10-CM

## 2019-10-15 DIAGNOSIS — E78.00 HYPERCHOLESTEREMIA: ICD-10-CM

## 2019-10-15 RX ORDER — LEVOTHYROXINE SODIUM 50 UG/1
50 TABLET ORAL DAILY
Qty: 90 TABLET | Refills: 0 | Status: SHIPPED | OUTPATIENT
Start: 2019-10-15 | End: 2019-12-06 | Stop reason: SDUPTHER

## 2019-10-15 RX ORDER — BUPROPION HYDROCHLORIDE 75 MG/1
150 TABLET ORAL 3 TIMES DAILY
Qty: 540 TABLET | Refills: 2 | Status: SHIPPED | OUTPATIENT
Start: 2019-10-15 | End: 2020-07-13 | Stop reason: SDUPTHER

## 2019-10-15 RX ORDER — LOSARTAN POTASSIUM 50 MG/1
50 TABLET ORAL DAILY
Qty: 90 TABLET | Refills: 3 | Status: SHIPPED | OUTPATIENT
Start: 2019-10-15 | End: 2020-05-19

## 2019-10-15 RX ORDER — SIMVASTATIN 20 MG/1
20 TABLET, FILM COATED ORAL DAILY
Qty: 90 TABLET | Refills: 3 | Status: SHIPPED | OUTPATIENT
Start: 2019-10-15 | End: 2020-12-01 | Stop reason: SDUPTHER

## 2019-10-15 RX ORDER — SUMATRIPTAN SUCCINATE 100 MG/1
100 TABLET ORAL DAILY PRN
Qty: 9 TABLET | Refills: 3 | Status: SHIPPED | OUTPATIENT
Start: 2019-10-15 | End: 2020-07-13 | Stop reason: SDUPTHER

## 2019-12-06 ENCOUNTER — OFFICE VISIT (OUTPATIENT)
Dept: FAMILY MEDICINE | Facility: CLINIC | Age: 79
End: 2019-12-06
Payer: MEDICARE

## 2019-12-06 VITALS
HEIGHT: 74 IN | BODY MASS INDEX: 26.58 KG/M2 | WEIGHT: 207.13 LBS | TEMPERATURE: 98 F | HEART RATE: 58 BPM | OXYGEN SATURATION: 95 % | SYSTOLIC BLOOD PRESSURE: 138 MMHG | DIASTOLIC BLOOD PRESSURE: 80 MMHG | RESPIRATION RATE: 16 BRPM

## 2019-12-06 DIAGNOSIS — Z23 NEED FOR SHINGLES VACCINE: ICD-10-CM

## 2019-12-06 DIAGNOSIS — I10 ESSENTIAL HYPERTENSION: ICD-10-CM

## 2019-12-06 DIAGNOSIS — E78.01 FAMILIAL HYPERCHOLESTEROLEMIA: Primary | ICD-10-CM

## 2019-12-06 DIAGNOSIS — E07.9 THYROID DISEASE: ICD-10-CM

## 2019-12-06 PROCEDURE — 1126F PR PAIN SEVERITY QUANTIFIED, NO PAIN PRESENT: ICD-10-PCS | Mod: ,,, | Performed by: FAMILY MEDICINE

## 2019-12-06 PROCEDURE — 1159F PR MEDICATION LIST DOCUMENTED IN MEDICAL RECORD: ICD-10-PCS | Mod: ,,, | Performed by: FAMILY MEDICINE

## 2019-12-06 PROCEDURE — 1159F MED LIST DOCD IN RCRD: CPT | Mod: ,,, | Performed by: FAMILY MEDICINE

## 2019-12-06 PROCEDURE — 1170F PR FUNCTIONAL STATUS ASSESSED: ICD-10-PCS | Mod: ,,, | Performed by: FAMILY MEDICINE

## 2019-12-06 PROCEDURE — 99214 OFFICE O/P EST MOD 30 MIN: CPT | Performed by: FAMILY MEDICINE

## 2019-12-06 PROCEDURE — 99214 PR OFFICE/OUTPT VISIT, EST, LEVL IV, 30-39 MIN: ICD-10-PCS | Mod: S$PBB,,, | Performed by: FAMILY MEDICINE

## 2019-12-06 PROCEDURE — 1126F AMNT PAIN NOTED NONE PRSNT: CPT | Mod: ,,, | Performed by: FAMILY MEDICINE

## 2019-12-06 PROCEDURE — 99214 OFFICE O/P EST MOD 30 MIN: CPT | Mod: S$PBB,,, | Performed by: FAMILY MEDICINE

## 2019-12-06 PROCEDURE — 1170F FXNL STATUS ASSESSED: CPT | Mod: ,,, | Performed by: FAMILY MEDICINE

## 2019-12-06 RX ORDER — LEVOTHYROXINE SODIUM 50 UG/1
50 TABLET ORAL DAILY
Qty: 90 TABLET | Refills: 1 | Status: SHIPPED | OUTPATIENT
Start: 2019-12-06 | End: 2020-08-24 | Stop reason: SDUPTHER

## 2019-12-06 NOTE — PROGRESS NOTES
SUBJECTIVE:    Patient ID: Gilberto Lee is a 79 y.o. male.    Chief Complaint: Hypertension and Hyperlipidemia    80 yo male new to this provider here to establish care. He has nor new complaints today, he will need some refills today and orders for new labs.      SPMHx:  Depression on Wellbutrin 75mg   HTN: On Losartan 50mg  Hyperlipidemia: Simvastatin  Hypothyroid: On Synthroid 50mcg has been on the same dose for > 1 year.  Allergies:  Loratadine 10mg  Hx of BPH: S/P TURP      Specialists:  Psych: Dr Alexsandra FROD Dr Linh  Derm: Dr Calixto    Smoke: None quit in 1975  ETOH: 1-2 drinks a day  Exercise: aerobic activity 3 days a week      Cholesterol                   181                         mg/dL       Triglycerides                  48                         mg/dL       HDL Cholesterol                75                        23-75  mg/dL       LDL Cholesterol                96                        0-100  mg/dL       VLDL Cholesterol               10 L                      12-27  mg/dL       Cholesterol Ratio            2.00                                             Hypertension   This is a chronic problem. The current episode started more than 1 year ago. The problem is unchanged. The problem is controlled. Pertinent negatives include no chest pain, palpitations or shortness of breath. Risk factors for coronary artery disease include dyslipidemia and male gender. Past treatments include angiotensin blockers. The current treatment provides moderate improvement. Identifiable causes of hypertension include a thyroid problem.   Hyperlipidemia   This is a chronic problem. The current episode started more than 1 year ago. The problem is controlled. Recent lipid tests were reviewed and are normal. Exacerbating diseases include hypothyroidism. There are no known factors aggravating his hyperlipidemia. Pertinent negatives include no chest pain or shortness of breath. Current antihyperlipidemic  treatment includes statins.   Thyroid Problem   Presents for follow-up visit. Symptoms include depressed mood. Patient reports no anxiety, cold intolerance, constipation, diaphoresis, diarrhea, dry skin, fatigue, hair loss, heat intolerance, hoarse voice, leg swelling, menstrual problem, nail problem, palpitations, tremors, visual change, weight gain or weight loss. The symptoms have been stable. His past medical history is significant for hyperlipidemia.         Past Medical History:   Diagnosis Date    Arthritis     Atelectasis of left lung 2015    Bradycardia 2015    Calcium blood increased 2016    Depression     Diastolic dysfunction without heart failure 2015    Elevated BUN 2016    Fatigue 2015    Hyperglycemia 2016    Hyperlipidemia     Migraine without aura 10/2015    Prehypertension 2016    Testosterone deficiency 2015    Thyroid disease      Social History     Socioeconomic History    Marital status:      Spouse name: Not on file    Number of children: Not on file    Years of education: Not on file    Highest education level: Not on file   Occupational History    Not on file   Social Needs    Financial resource strain: Not on file    Food insecurity:     Worry: Not on file     Inability: Not on file    Transportation needs:     Medical: Not on file     Non-medical: Not on file   Tobacco Use    Smoking status: Former Smoker     Last attempt to quit: 1972     Years since quittin.4    Smokeless tobacco: Never Used   Substance and Sexual Activity    Alcohol use: Yes     Comment: occassional    Drug use: No    Sexual activity: Yes   Lifestyle    Physical activity:     Days per week: Not on file     Minutes per session: Not on file    Stress: Not at all   Relationships    Social connections:     Talks on phone: Not on file     Gets together: Not on file     Attends Gnosticist service: Not on file     Active member of club or  organization: Not on file     Attends meetings of clubs or organizations: Not on file     Relationship status: Not on file   Other Topics Concern    Not on file   Social History Narrative    Not on file     Past Surgical History:   Procedure Laterality Date    ACHILLES TENDON SURGERY      APPENDECTOMY      DUPUYTREN CONTRACTURE RELEASE  2014    LUMBAR EPIDURAL INJECTION  2013    PROSTATE SURGERY      SACROILIAC JOINT INJECTION      TONSILLECTOMY      TRANSURETHRAL RESECTION OF PROSTATE       Family History   Problem Relation Age of Onset    COPD Father      Current Outpatient Medications   Medication Sig Dispense Refill    buPROPion (WELLBUTRIN) 75 MG tablet Take 2 tablets (150 mg total) by mouth 3 (three) times daily. 540 tablet 2    CALCIUM CITRATE/VITAMIN D3 (CITRACAL + D ORAL) Take by mouth. Taking 2 tablets by mouth daily      glucosamine-chondroit-vit C-Mn (GLUCOSAMINE CHONDROITIN MAXSTR) 500-400 mg Cap Take 1 tablet by mouth 3 (three) times daily. 3 Capsule Oral Every day      levothyroxine (SYNTHROID) 50 MCG tablet Take 1 tablet (50 mcg total) by mouth once daily. 90 tablet 1    loratadine (CLARITIN) 10 mg tablet Take 10 mg by mouth once daily.      losartan (COZAAR) 50 MG tablet Take 1 tablet (50 mg total) by mouth once daily. 90 tablet 3    naproxen sodium (ANAPROX) 220 MG tablet Take 220 mg by mouth 2 (two) times daily with meals. 1 Tablet Oral Twice a day       omega-3 fatty acids 1,000 mg Cap Take by mouth once daily. 1 Capsule Oral Every day      simvastatin (ZOCOR) 20 MG tablet Take 1 tablet (20 mg total) by mouth once daily. 90 tablet 3    sumatriptan (IMITREX) 100 MG tablet Take 1 tablet (100 mg total) by mouth daily as needed for Migraine. 9 tablet 3    adjuvant AS01B, PF,vial 1 of 2 (SHINGRIX ADJUVANT COMPONENT-PF) Susp Inject 0.5 mLs into the muscle once. for 1 dose 0.5 mL 0     No current facility-administered medications for this visit.      Review of patient's allergies  "indicates:   Allergen Reactions    Demerol  [meperidine]      Agitation  Other reaction(s): Unknown       Review of Systems   Constitutional: Negative for activity change, diaphoresis, fatigue, fever, unexpected weight change, weight gain and weight loss.   HENT: Negative for congestion, hoarse voice, rhinorrhea, sinus pressure, sinus pain and sore throat.    Eyes: Negative.    Respiratory: Negative for cough, chest tightness, shortness of breath and wheezing.    Cardiovascular: Negative for chest pain, palpitations and leg swelling.   Gastrointestinal: Negative for abdominal pain, blood in stool, constipation, diarrhea and nausea.   Endocrine: Negative for cold intolerance and heat intolerance.   Genitourinary: Negative for dysuria, flank pain, frequency, menstrual problem and urgency.   Neurological: Negative for tremors.   Psychiatric/Behavioral: The patient is not nervous/anxious.           Blood pressure 138/80, pulse (!) 58, temperature 97.6 °F (36.4 °C), temperature source Oral, resp. rate 16, height 6' 2" (1.88 m), weight 93.9 kg (207 lb 1.6 oz), SpO2 95 %. Body mass index is 26.59 kg/m².   Objective:      Physical Exam   Constitutional: He is oriented to person, place, and time. He appears well-developed and well-nourished. No distress.   HENT:   Head: Normocephalic and atraumatic.   Nose: Nose normal.   Mouth/Throat: Oropharynx is clear and moist.   Eyes: Pupils are equal, round, and reactive to light. Conjunctivae and EOM are normal. No scleral icterus.   Cardiovascular: Normal rate, regular rhythm and normal heart sounds.   No murmur (Pt with a hx of murmur none heard on exam today) heard.  Pulmonary/Chest: Effort normal and breath sounds normal. No respiratory distress. He has no wheezes.   Neurological: He is alert and oriented to person, place, and time.   Skin: Skin is warm and dry. Capillary refill takes less than 2 seconds. He is not diaphoretic.   Vitals reviewed.          Assessment:       1. " Familial hypercholesterolemia    2. Thyroid disease    3. Essential hypertension    4. Need for shingles vaccine         Plan:           Familial hypercholesterolemia  -     Lipid panel; Future; Expected date: 12/06/2019  Limit red meat, butter, fried foods, cheese, and other foods that have a lot of saturated fat. Consume more: lean meats, fish, fruits, vegetables, whole grains, beans, lentils, and nuts.  Weight loss, and 30-45 min of cardiovascular exercise daily.  Thyroid disease  -     levothyroxine (SYNTHROID) 50 MCG tablet; Take 1 tablet (50 mcg total) by mouth once daily.  Dispense: 90 tablet; Refill: 1  -     TSH; Future; Expected date: 12/06/2019  Pt doing well on his medications will refill and check his TSH before next visit.  Essential hypertension  -     Comprehensive metabolic panel; Future; Expected date: 12/06/2019  Reduce the amount of salt in your diet; Lose weight; Avoid drinking too much alcohol; Exercise at least 30 minutes per day most days of the week.  Continue current medications and home BP monitoring.  Need for shingles vaccine  -     adjuvant AS01B, PF,vial 1 of 2 (SHINGRIX ADJUVANT COMPONENT-PF) Susp; Inject 0.5 mLs into the muscle once. for 1 dose  Dispense: 0.5 mL; Refill: 0

## 2020-03-02 ENCOUNTER — PATIENT MESSAGE (OUTPATIENT)
Dept: FAMILY MEDICINE | Facility: CLINIC | Age: 80
End: 2020-03-02

## 2020-03-02 RX ORDER — ESZOPICLONE 2 MG/1
2 TABLET, FILM COATED ORAL NIGHTLY
Qty: 8 TABLET | Refills: 0 | Status: SHIPPED | OUTPATIENT
Start: 2020-03-02 | End: 2020-04-01

## 2020-04-28 ENCOUNTER — PATIENT MESSAGE (OUTPATIENT)
Dept: FAMILY MEDICINE | Facility: CLINIC | Age: 80
End: 2020-04-28

## 2020-04-30 ENCOUNTER — OFFICE VISIT (OUTPATIENT)
Dept: FAMILY MEDICINE | Facility: CLINIC | Age: 80
End: 2020-04-30
Payer: MEDICARE

## 2020-04-30 DIAGNOSIS — R06.02 SOB (SHORTNESS OF BREATH): Primary | ICD-10-CM

## 2020-04-30 PROCEDURE — 99213 OFFICE O/P EST LOW 20 MIN: CPT | Mod: 95,,, | Performed by: FAMILY MEDICINE

## 2020-04-30 PROCEDURE — 99213 PR OFFICE/OUTPT VISIT, EST, LEVL III, 20-29 MIN: ICD-10-PCS | Mod: 95,,, | Performed by: FAMILY MEDICINE

## 2020-04-30 RX ORDER — MONTELUKAST SODIUM 10 MG/1
10 TABLET ORAL NIGHTLY
Qty: 30 TABLET | Refills: 0 | Status: SHIPPED | OUTPATIENT
Start: 2020-04-30 | End: 2020-05-26

## 2020-04-30 RX ORDER — FLUTICASONE PROPIONATE 50 MCG
1 SPRAY, SUSPENSION (ML) NASAL DAILY
Qty: 9.9 ML | Refills: 0 | Status: SHIPPED | OUTPATIENT
Start: 2020-04-30 | End: 2020-06-23

## 2020-04-30 NOTE — PROGRESS NOTES
Subjective:        The chief complaint leading to consultation is: SOB  The patient location is:  Home  Visit type: Virtual visit with synchronous audio/video or audio only  This was a video visit in lieu of in-person visit due to the coronavirus emergency. Patient acknowledged and consented to the video visit encounter.     78 yo male with no history of cardiac or pulmonary disease, initially sent a portal message describing having low blood pressures at home and feeling light headed, he stopped his losartan and his blood pressures according to him have returned to normal (120/70), yesterday he messaged that he is continuing to have SOB and Dizziness at night. Is is not current SOB but states he has to breath through his mouth to get a good breath.    No Fevers, No chills, No Cough, He does relate sinus congestion which he attributes to to allergies.    No urinary complaints, No hematuria.  No bowel issues, no BRBPR, No melena  No chest pain  No weight loss    No hx of heat  issues  SPMHx:  Depression on Wellbutrin 75mg   HTN: On Losartan 50mg  Hyperlipidemia: Simvastatin  Hypothyroid: On Synthroid 50mcg has been on the same dose for > 1 year.  Allergies:  Loratadine 10mg  Hx of BPH: S/P TURP        Specialists:  Psych: Dr Alexsandra FORD Dr Linh  Derm: Dr Calixto     Smoke: None quit in 1975  ETOH: 1-2 drinks a day  Exercise: aerobic activity 3 days a week    HPI    Past Surgical History:   Procedure Laterality Date    ACHILLES TENDON SURGERY      APPENDECTOMY      DUPUYTREN CONTRACTURE RELEASE  2014    LUMBAR EPIDURAL INJECTION  2013    PROSTATE SURGERY      SACROILIAC JOINT INJECTION      TONSILLECTOMY      TRANSURETHRAL RESECTION OF PROSTATE       Past Medical History:   Diagnosis Date    Arthritis     Atelectasis of left lung 07/2015    Bradycardia 07/17/2015    Calcium blood increased 08/09/2016    Depression     Diastolic dysfunction without heart failure 07/2015    Elevated BUN 08/09/2016     Fatigue 04/29/2015    Hyperglycemia 08/09/2016    Hyperlipidemia     Migraine without aura 10/2015    Prehypertension 11/2016    Testosterone deficiency 04/2015    Thyroid disease      Family History   Problem Relation Age of Onset    COPD Father         Social History:   Marital Status:   Alcohol History:  reports that he drinks alcohol.  Tobacco History:  reports that he quit smoking about 47 years ago. He has never used smokeless tobacco.  Drug History:  reports that he does not use drugs.    Review of patient's allergies indicates:   Allergen Reactions    Demerol  [meperidine]      Agitation  Other reaction(s): Unknown       Current Outpatient Medications   Medication Sig Dispense Refill    buPROPion (WELLBUTRIN) 75 MG tablet Take 2 tablets (150 mg total) by mouth 3 (three) times daily. 540 tablet 2    CALCIUM CITRATE/VITAMIN D3 (CITRACAL + D ORAL) Take by mouth. Taking 2 tablets by mouth daily      fluticasone propionate (FLONASE) 50 mcg/actuation nasal spray 1 spray (50 mcg total) by Each Nostril route once daily. 9.9 mL 0    glucosamine-chondroit-vit C-Mn (GLUCOSAMINE CHONDROITIN MAXSTR) 500-400 mg Cap Take 1 tablet by mouth 3 (three) times daily. 3 Capsule Oral Every day      levothyroxine (SYNTHROID) 50 MCG tablet Take 1 tablet (50 mcg total) by mouth once daily. 90 tablet 1    loratadine (CLARITIN) 10 mg tablet Take 10 mg by mouth once daily.      losartan (COZAAR) 50 MG tablet Take 1 tablet (50 mg total) by mouth once daily. 90 tablet 3    montelukast (SINGULAIR) 10 mg tablet Take 1 tablet (10 mg total) by mouth every evening. 30 tablet 0    naproxen sodium (ANAPROX) 220 MG tablet Take 220 mg by mouth 2 (two) times daily with meals. 1 Tablet Oral Twice a day       omega-3 fatty acids 1,000 mg Cap Take by mouth once daily. 1 Capsule Oral Every day      simvastatin (ZOCOR) 20 MG tablet Take 1 tablet (20 mg total) by mouth once daily. 90 tablet 3    sumatriptan (IMITREX) 100 MG  tablet Take 1 tablet (100 mg total) by mouth daily as needed for Migraine. 9 tablet 3     No current facility-administered medications for this visit.        Review of Systems   Constitutional: Negative for activity change, appetite change, diaphoresis, fatigue, fever and unexpected weight change.   HENT: Positive for congestion and postnasal drip.    Respiratory: Positive for shortness of breath. Negative for cough, chest tightness and wheezing.    Cardiovascular: Negative for chest pain and palpitations.   Gastrointestinal: Positive for anal bleeding and blood in stool.   Genitourinary: Positive for hematuria.   Neurological: Positive for dizziness.         Objective:        Physical Exam:   Physical Exam   Constitutional: He is oriented to person, place, and time. He appears well-developed and well-nourished. No distress.   HENT:   Head: Normocephalic and atraumatic.   Eyes: Conjunctivae are normal. No scleral icterus.   Musculoskeletal: He exhibits edema.   Pt pressed on his lower leg without leaving an indention.   Neurological: He is alert and oriented to person, place, and time.   Skin: He is not diaphoretic.            Assessment:       1. SOB (shortness of breath)      Plan:   SOB (shortness of breath)  -     Brain natriuretic peptide; Future; Expected date: 04/30/2020  -     CBC auto differential; Future; Expected date: 04/30/2020  I will screen for evidence of CHF Via lab and screen for anemia and have him make a follow up in person.    Other orders  -     fluticasone propionate (FLONASE) 50 mcg/actuation nasal spray; 1 spray (50 mcg total) by Each Nostril route once daily.  Dispense: 9.9 mL; Refill: 0  -     montelukast (SINGULAIR) 10 mg tablet; Take 1 tablet (10 mg total) by mouth every evening.  Dispense: 30 tablet; Refill: 0      No follow-ups on file.    Total time spent with patient: 15      Each patient to whom he or she provides medical services by telemedicine is:  (1) informed of the  relationship between the physician and patient and the respective role of any other health care provider with respect to management of the patient; and (2) notified that he or she may decline to receive medical services by telemedicine and may withdraw from such care at any time.    This note was created using [employer] voice recognition software that occasionally misinterprets phrases or words.

## 2020-05-01 ENCOUNTER — LAB VISIT (OUTPATIENT)
Dept: LAB | Facility: HOSPITAL | Age: 80
End: 2020-05-01
Attending: FAMILY MEDICINE
Payer: MEDICARE

## 2020-05-01 DIAGNOSIS — R06.02 SOB (SHORTNESS OF BREATH): ICD-10-CM

## 2020-05-01 DIAGNOSIS — E78.01 FAMILIAL HYPERCHOLESTEROLEMIA: ICD-10-CM

## 2020-05-01 DIAGNOSIS — I10 ESSENTIAL HYPERTENSION: ICD-10-CM

## 2020-05-01 DIAGNOSIS — E07.9 THYROID DISEASE: ICD-10-CM

## 2020-05-01 LAB
ALBUMIN SERPL BCP-MCNC: 4 G/DL (ref 3.5–5.2)
ALP SERPL-CCNC: 48 U/L (ref 55–135)
ALT SERPL W/O P-5'-P-CCNC: 22 U/L (ref 10–44)
ANION GAP SERPL CALC-SCNC: 7 MMOL/L (ref 8–16)
AST SERPL-CCNC: 28 U/L (ref 10–40)
BASOPHILS # BLD AUTO: 0.07 K/UL (ref 0–0.2)
BASOPHILS NFR BLD: 1.1 % (ref 0–1.9)
BILIRUB SERPL-MCNC: 0.9 MG/DL (ref 0.1–1)
BNP SERPL-MCNC: 142 PG/ML (ref 0–99)
BUN SERPL-MCNC: 21 MG/DL (ref 8–23)
CALCIUM SERPL-MCNC: 9.1 MG/DL (ref 8.7–10.5)
CHLORIDE SERPL-SCNC: 104 MMOL/L (ref 95–110)
CHOLEST SERPL-MCNC: 182 MG/DL (ref 120–199)
CHOLEST/HDLC SERPL: 2.5 {RATIO} (ref 2–5)
CO2 SERPL-SCNC: 28 MMOL/L (ref 23–29)
CREAT SERPL-MCNC: 1.1 MG/DL (ref 0.5–1.4)
DIFFERENTIAL METHOD: NORMAL
EOSINOPHIL # BLD AUTO: 0.2 K/UL (ref 0–0.5)
EOSINOPHIL NFR BLD: 2.9 % (ref 0–8)
ERYTHROCYTE [DISTWIDTH] IN BLOOD BY AUTOMATED COUNT: 12.6 % (ref 11.5–14.5)
EST. GFR  (AFRICAN AMERICAN): >60 ML/MIN/1.73 M^2
EST. GFR  (NON AFRICAN AMERICAN): >60 ML/MIN/1.73 M^2
GLUCOSE SERPL-MCNC: 109 MG/DL (ref 70–110)
HCT VFR BLD AUTO: 44.6 % (ref 40–54)
HDLC SERPL-MCNC: 72 MG/DL (ref 40–75)
HDLC SERPL: 39.6 % (ref 20–50)
HGB BLD-MCNC: 14.5 G/DL (ref 14–18)
IMM GRANULOCYTES # BLD AUTO: 0.02 K/UL (ref 0–0.04)
IMM GRANULOCYTES NFR BLD AUTO: 0.3 % (ref 0–0.5)
LDLC SERPL CALC-MCNC: 100 MG/DL (ref 63–159)
LYMPHOCYTES # BLD AUTO: 2 K/UL (ref 1–4.8)
LYMPHOCYTES NFR BLD: 30.9 % (ref 18–48)
MCH RBC QN AUTO: 30.7 PG (ref 27–31)
MCHC RBC AUTO-ENTMCNC: 32.5 G/DL (ref 32–36)
MCV RBC AUTO: 94 FL (ref 82–98)
MONOCYTES # BLD AUTO: 0.7 K/UL (ref 0.3–1)
MONOCYTES NFR BLD: 10.5 % (ref 4–15)
NEUTROPHILS # BLD AUTO: 3.5 K/UL (ref 1.8–7.7)
NEUTROPHILS NFR BLD: 54.3 % (ref 38–73)
NONHDLC SERPL-MCNC: 110 MG/DL
NRBC BLD-RTO: 0 /100 WBC
PLATELET # BLD AUTO: 232 K/UL (ref 150–350)
PMV BLD AUTO: 9.6 FL (ref 9.2–12.9)
POTASSIUM SERPL-SCNC: 4.2 MMOL/L (ref 3.5–5.1)
PROT SERPL-MCNC: 7.3 G/DL (ref 6–8.4)
RBC # BLD AUTO: 4.73 M/UL (ref 4.6–6.2)
SODIUM SERPL-SCNC: 139 MMOL/L (ref 136–145)
TRIGL SERPL-MCNC: 50 MG/DL (ref 30–150)
TSH SERPL DL<=0.005 MIU/L-ACNC: 4.02 UIU/ML (ref 0.34–5.6)
WBC # BLD AUTO: 6.47 K/UL (ref 3.9–12.7)

## 2020-05-01 PROCEDURE — 80053 COMPREHEN METABOLIC PANEL: CPT

## 2020-05-01 PROCEDURE — 36415 COLL VENOUS BLD VENIPUNCTURE: CPT

## 2020-05-01 PROCEDURE — 85025 COMPLETE CBC W/AUTO DIFF WBC: CPT

## 2020-05-01 PROCEDURE — 84443 ASSAY THYROID STIM HORMONE: CPT

## 2020-05-01 PROCEDURE — 80061 LIPID PANEL: CPT

## 2020-05-01 PROCEDURE — 83880 ASSAY OF NATRIURETIC PEPTIDE: CPT

## 2020-05-04 ENCOUNTER — PATIENT MESSAGE (OUTPATIENT)
Dept: FAMILY MEDICINE | Facility: CLINIC | Age: 80
End: 2020-05-04

## 2020-05-04 DIAGNOSIS — E21.5 PARATHYROID ABNORMALITY: Primary | ICD-10-CM

## 2020-05-06 ENCOUNTER — LAB VISIT (OUTPATIENT)
Dept: LAB | Facility: HOSPITAL | Age: 80
End: 2020-05-06
Attending: FAMILY MEDICINE
Payer: MEDICARE

## 2020-05-06 DIAGNOSIS — E21.5 PARATHYROID ABNORMALITY: ICD-10-CM

## 2020-05-06 LAB — PTH-INTACT SERPL-MCNC: 44.7 PG/ML (ref 9–77)

## 2020-05-06 PROCEDURE — 36415 COLL VENOUS BLD VENIPUNCTURE: CPT

## 2020-05-06 PROCEDURE — 83970 ASSAY OF PARATHORMONE: CPT

## 2020-05-19 ENCOUNTER — OFFICE VISIT (OUTPATIENT)
Dept: FAMILY MEDICINE | Facility: CLINIC | Age: 80
End: 2020-05-19
Payer: MEDICARE

## 2020-05-19 VITALS
TEMPERATURE: 98 F | DIASTOLIC BLOOD PRESSURE: 88 MMHG | OXYGEN SATURATION: 96 % | WEIGHT: 204 LBS | RESPIRATION RATE: 18 BRPM | HEART RATE: 75 BPM | HEIGHT: 74 IN | BODY MASS INDEX: 26.18 KG/M2 | SYSTOLIC BLOOD PRESSURE: 142 MMHG

## 2020-05-19 DIAGNOSIS — I10 ESSENTIAL HYPERTENSION: Primary | ICD-10-CM

## 2020-05-19 DIAGNOSIS — E07.9 THYROID DISEASE: ICD-10-CM

## 2020-05-19 DIAGNOSIS — E78.01 FAMILIAL HYPERCHOLESTEROLEMIA: ICD-10-CM

## 2020-05-19 PROCEDURE — 99214 OFFICE O/P EST MOD 30 MIN: CPT | Mod: S$PBB,,, | Performed by: FAMILY MEDICINE

## 2020-05-19 PROCEDURE — 99214 PR OFFICE/OUTPT VISIT, EST, LEVL IV, 30-39 MIN: ICD-10-PCS | Mod: S$PBB,,, | Performed by: FAMILY MEDICINE

## 2020-05-19 PROCEDURE — 99215 OFFICE O/P EST HI 40 MIN: CPT | Performed by: FAMILY MEDICINE

## 2020-05-19 RX ORDER — LOSARTAN POTASSIUM 25 MG/1
25 TABLET ORAL DAILY
Qty: 90 TABLET | Refills: 3 | Status: SHIPPED | OUTPATIENT
Start: 2020-05-19 | End: 2021-05-13

## 2020-05-19 NOTE — PROGRESS NOTES
SUBJECTIVE:    Patient ID: Gilberto Lee is a 79 y.o. male.    Chief Complaint: Thyroid Problem and Shortness of Breath (follow up)    78 yo male with no history of cardiac or pulmonary disease, initially sent a portal message describing having low blood pressures at home and feeling light headed, he stopped his losartan and his blood pressures according to him have returned to normal (120/70) to elevated, he was seen for a virtual visit for some dizziness which has now resolved, today his BP is elevated and he needs to be placed back on a blood pressure medication. I will start him on half of the last dose.    No Fevers, No chills, No Cough, He does relate sinus congestion which he attributes to to allergies.     No urinary complaints, No hematuria.  No bowel issues, no BRBPR, No melena  No chest pain  No weight loss  No hx of heat  issues    SPMHx:  Depression on Wellbutrin 75mg   HTN: On Losartan 50mg will decrease the dose  Hyperlipidemia: Simvastatin  Hypothyroid: On Synthroid 50mcg has been on the same dose for > 1 year.  Allergies:  Loratadine 10mg  Hx of BPH: S/P TURP        Specialists:  Psych: Dr Alexsandra FORD Dr Linh  Derm: Dr Calixto     Smoke: None quit in   ETOH: 1-2 drinks a day  Exercise: aerobic activity 3 days a week    TSH 0.340 - 5.600 uIU/mL 4.020      Lipids:182  Tri  HDL 72  LDL: 100    Fasting  Glucose 70 - 110 mg/dL 109            Hypertension   This is a chronic problem. The current episode started more than 1 year ago. The problem is unchanged. The problem is uncontrolled. Pertinent negatives include no chest pain, headaches, malaise/fatigue, neck pain, orthopnea, palpitations, peripheral edema, PND, shortness of breath or sweats. There are no associated agents to hypertension. Risk factors for coronary artery disease include male gender and dyslipidemia. Past treatments include ACE inhibitors. The current treatment provides moderate improvement. There are no compliance  problems.  Identifiable causes of hypertension include a thyroid problem.   Thyroid Problem   Presents for follow-up visit. Patient reports no anxiety, cold intolerance, constipation, depressed mood, diarrhea, dry skin, fatigue, hair loss, heat intolerance, hoarse voice, leg swelling, palpitations, visual change, weight gain or weight loss. The symptoms have been stable. His past medical history is significant for hyperlipidemia.   Hyperlipidemia   This is a chronic problem. The current episode started more than 1 year ago. The problem is controlled. Recent lipid tests were reviewed and are normal. There are no known factors aggravating his hyperlipidemia. Pertinent negatives include no chest pain, myalgias or shortness of breath. Current antihyperlipidemic treatment includes statins. The current treatment provides moderate improvement of lipids. There are no compliance problems.          Past Medical History:   Diagnosis Date    Arthritis     Atelectasis of left lung 2015    Bradycardia 2015    Calcium blood increased 2016    Depression     Diastolic dysfunction without heart failure 2015    Elevated BUN 2016    Fatigue 2015    Hyperglycemia 2016    Hyperlipidemia     Migraine without aura 10/2015    Prehypertension 2016    Testosterone deficiency 2015    Thyroid disease      Social History     Socioeconomic History    Marital status:      Spouse name: Not on file    Number of children: Not on file    Years of education: Not on file    Highest education level: Not on file   Occupational History    Not on file   Social Needs    Financial resource strain: Not on file    Food insecurity:     Worry: Not on file     Inability: Not on file    Transportation needs:     Medical: Not on file     Non-medical: Not on file   Tobacco Use    Smoking status: Former Smoker     Last attempt to quit: 1972     Years since quittin.8    Smokeless  tobacco: Never Used   Substance and Sexual Activity    Alcohol use: Yes     Comment: occassional    Drug use: No    Sexual activity: Yes   Lifestyle    Physical activity:     Days per week: Not on file     Minutes per session: Not on file    Stress: Not at all   Relationships    Social connections:     Talks on phone: Not on file     Gets together: Not on file     Attends Sikhism service: Not on file     Active member of club or organization: Not on file     Attends meetings of clubs or organizations: Not on file     Relationship status: Not on file   Other Topics Concern    Not on file   Social History Narrative    Not on file     Past Surgical History:   Procedure Laterality Date    ACHILLES TENDON SURGERY      APPENDECTOMY      DUPUYTREN CONTRACTURE RELEASE  2014    LUMBAR EPIDURAL INJECTION  2013    PROSTATE SURGERY      SACROILIAC JOINT INJECTION      TONSILLECTOMY      TRANSURETHRAL RESECTION OF PROSTATE       Family History   Problem Relation Age of Onset    COPD Father      Current Outpatient Medications   Medication Sig Dispense Refill    buPROPion (WELLBUTRIN) 75 MG tablet Take 2 tablets (150 mg total) by mouth 3 (three) times daily. 540 tablet 2    CALCIUM CITRATE/VITAMIN D3 (CITRACAL + D ORAL) Take by mouth. Taking 2 tablets by mouth daily      fluticasone propionate (FLONASE) 50 mcg/actuation nasal spray 1 spray (50 mcg total) by Each Nostril route once daily. 9.9 mL 0    glucosamine-chondroit-vit C-Mn (GLUCOSAMINE CHONDROITIN MAXSTR) 500-400 mg Cap Take 1 tablet by mouth 3 (three) times daily. 3 Capsule Oral Every day      levothyroxine (SYNTHROID) 50 MCG tablet Take 1 tablet (50 mcg total) by mouth once daily. 90 tablet 1    loratadine (CLARITIN) 10 mg tablet Take 10 mg by mouth once daily.      montelukast (SINGULAIR) 10 mg tablet Take 1 tablet (10 mg total) by mouth every evening. 30 tablet 0    naproxen sodium (ANAPROX) 220 MG tablet Take 220 mg by mouth 2 (two) times  daily with meals. 1 Tablet Oral Twice a day       omega-3 fatty acids 1,000 mg Cap Take by mouth once daily. 1 Capsule Oral Every day      simvastatin (ZOCOR) 20 MG tablet Take 1 tablet (20 mg total) by mouth once daily. 90 tablet 3    sumatriptan (IMITREX) 100 MG tablet Take 1 tablet (100 mg total) by mouth daily as needed for Migraine. 9 tablet 3    losartan (COZAAR) 25 MG tablet Take 1 tablet (25 mg total) by mouth once daily. 90 tablet 3     No current facility-administered medications for this visit.      Review of patient's allergies indicates:   Allergen Reactions    Demerol  [meperidine]      Agitation  Other reaction(s): Unknown       Review of Systems   Constitutional: Negative for activity change, appetite change, fatigue, fever, malaise/fatigue, weight gain and weight loss.   HENT: Negative for congestion, ear pain, hearing loss, hoarse voice, postnasal drip, sinus pressure, sinus pain, sneezing and sore throat.    Eyes: Negative for photophobia and pain.   Respiratory: Negative for cough, chest tightness, shortness of breath and wheezing.    Cardiovascular: Negative for chest pain, palpitations, orthopnea, leg swelling and PND.   Gastrointestinal: Negative for abdominal distention, abdominal pain, blood in stool, constipation, diarrhea, nausea and vomiting.   Endocrine: Negative for cold intolerance, heat intolerance, polydipsia and polyuria.   Genitourinary: Negative for difficulty urinating, dysuria, flank pain, frequency, hematuria and urgency.   Musculoskeletal: Negative for arthralgias, back pain, joint swelling, myalgias and neck pain.   Skin: Negative for pallor and rash.   Allergic/Immunologic: Negative for environmental allergies and food allergies.   Neurological: Negative for dizziness, weakness, light-headedness and headaches.   Hematological: Does not bruise/bleed easily.   Psychiatric/Behavioral: Negative for confusion, decreased concentration and sleep disturbance. The patient is not  "nervous/anxious.           Blood pressure (!) 142/88, pulse 75, temperature 97.8 °F (36.6 °C), temperature source Oral, resp. rate 18, height 6' 2" (1.88 m), weight 92.5 kg (204 lb), SpO2 96 %. Body mass index is 26.19 kg/m².   Objective:      Physical Exam   Constitutional: He is oriented to person, place, and time. He appears well-developed and well-nourished. No distress.   HENT:   Head: Normocephalic and atraumatic.   Right Ear: External ear normal.   Left Ear: External ear normal.   Mouth/Throat: Oropharynx is clear and moist.   Eyes: Pupils are equal, round, and reactive to light. Conjunctivae are normal. Right eye exhibits no discharge. Left eye exhibits no discharge.   Neck: Normal range of motion. Neck supple. No thyromegaly present.   Cardiovascular: Normal rate, regular rhythm and normal heart sounds.   No murmur heard.  Pulmonary/Chest: Effort normal and breath sounds normal. No respiratory distress. He has no wheezes.   Neurological: He is alert and oriented to person, place, and time.   Skin: Skin is warm and dry. He is not diaphoretic.           Assessment:       1. Essential hypertension    2. Familial hypercholesterolemia    3. Thyroid disease         Plan:           Essential hypertension  -     losartan (COZAAR) 25 MG tablet; Take 1 tablet (25 mg total) by mouth once daily.  Dispense: 90 tablet; Refill: 3  Pt had discontinued the losartan due to hypotension and SOB, this has resolved, will Restart his BP medications at half the last dose. Reduce the amount of salt in your diet; Avoid drinking too much alcohol; Exercise at least 30 minutes per day most days of the week.  Continue current medications and home BP monitoring.  Familial hypercholesterolemia  Limit red meat, butter, fried foods, cheese, and other foods that have a lot of saturated fat. Consume more: lean meats, fish, fruits, vegetables, whole grains, beans, lentils, and nuts.  Weight loss, and 30-45 min of cardiovascular exercise " daily.  Thyroid disease  TSH within normal range will continue on current medications.

## 2020-05-26 RX ORDER — MONTELUKAST SODIUM 10 MG/1
TABLET ORAL
Qty: 30 TABLET | Refills: 0 | Status: SHIPPED | OUTPATIENT
Start: 2020-05-26 | End: 2020-06-18

## 2020-07-08 RX ORDER — FLUTICASONE PROPIONATE 50 MCG
SPRAY, SUSPENSION (ML) NASAL
Qty: 16 ML | Refills: 5 | Status: SHIPPED | OUTPATIENT
Start: 2020-07-08 | End: 2021-07-29

## 2020-07-13 DIAGNOSIS — R51.9 GENERALIZED HEADACHES: ICD-10-CM

## 2020-07-14 RX ORDER — BUPROPION HYDROCHLORIDE 75 MG/1
150 TABLET ORAL 3 TIMES DAILY
Qty: 540 TABLET | Refills: 2 | Status: SHIPPED | OUTPATIENT
Start: 2020-07-14 | End: 2021-08-16

## 2020-07-14 RX ORDER — SUMATRIPTAN SUCCINATE 100 MG/1
100 TABLET ORAL DAILY PRN
Qty: 9 TABLET | Refills: 3 | Status: SHIPPED | OUTPATIENT
Start: 2020-07-14 | End: 2021-02-17

## 2020-08-24 DIAGNOSIS — E07.9 THYROID DISEASE: ICD-10-CM

## 2020-08-25 RX ORDER — LEVOTHYROXINE SODIUM 50 UG/1
50 TABLET ORAL DAILY
Qty: 90 TABLET | Refills: 1 | Status: SHIPPED | OUTPATIENT
Start: 2020-08-25 | End: 2021-02-17

## 2020-11-18 ENCOUNTER — OFFICE VISIT (OUTPATIENT)
Dept: FAMILY MEDICINE | Facility: CLINIC | Age: 80
End: 2020-11-18
Payer: MEDICARE

## 2020-11-18 VITALS
BODY MASS INDEX: 26.5 KG/M2 | DIASTOLIC BLOOD PRESSURE: 78 MMHG | HEART RATE: 74 BPM | TEMPERATURE: 98 F | SYSTOLIC BLOOD PRESSURE: 120 MMHG | HEIGHT: 74 IN | OXYGEN SATURATION: 97 % | WEIGHT: 206.5 LBS | RESPIRATION RATE: 16 BRPM

## 2020-11-18 DIAGNOSIS — E78.01 FAMILIAL HYPERCHOLESTEROLEMIA: Primary | ICD-10-CM

## 2020-11-18 DIAGNOSIS — I10 ESSENTIAL HYPERTENSION: ICD-10-CM

## 2020-11-18 DIAGNOSIS — E03.8 OTHER SPECIFIED HYPOTHYROIDISM: ICD-10-CM

## 2020-11-18 PROCEDURE — 99213 PR OFFICE/OUTPT VISIT, EST, LEVL III, 20-29 MIN: ICD-10-PCS | Mod: S$PBB,,, | Performed by: FAMILY MEDICINE

## 2020-11-18 PROCEDURE — 99215 OFFICE O/P EST HI 40 MIN: CPT | Performed by: FAMILY MEDICINE

## 2020-11-18 PROCEDURE — 99213 OFFICE O/P EST LOW 20 MIN: CPT | Mod: S$PBB,,, | Performed by: FAMILY MEDICINE

## 2020-11-18 NOTE — PROGRESS NOTES
SUBJECTIVE:    Patient ID: Gilberto Lee is a 80 y.o. male.    Chief Complaint: Hypertension  81 yo male here today to follow-up on his chronic medical conditions he has not had labs prior to this visit.  Patient is feeling well he has no complaints today and does not need any medications refilled.    SPMHx:  Depression on Wellbutrin 75mg   HTN: On Losartan 25mg  Hyperlipidemia: Simvastatin 20mg  Hypothyroid: On Synthroid 50mcg has been on the same dose for > 1 year.  Allergies:  Loratadine 10mg  Hx of BPH: S/P TURP      Specialists:  Psych: Dr Upton  GI:  Dr Melton  Derm: Dr Calixto     Smoke: None quit in 1975  ETOH: 1-2 drinks a day  Exercise: aerobic activity 3 days a week      Hypertension  This is a chronic problem. The current episode started more than 1 year ago. The problem is unchanged. The problem is controlled. Pertinent negatives include no anxiety, blurred vision, chest pain, headaches, malaise/fatigue, neck pain, orthopnea, palpitations, peripheral edema, PND, shortness of breath or sweats. There are no associated agents to hypertension. Risk factors for coronary artery disease include male gender and dyslipidemia. Past treatments include angiotensin blockers. The current treatment provides moderate improvement.         Past Medical History:   Diagnosis Date    Arthritis     Atelectasis of left lung 07/2015    Bradycardia 07/17/2015    Calcium blood increased 08/09/2016    Depression     Diastolic dysfunction without heart failure 07/2015    Elevated BUN 08/09/2016    Fatigue 04/29/2015    Hyperglycemia 08/09/2016    Hyperlipidemia     Migraine without aura 10/2015    Prehypertension 11/2016    Testosterone deficiency 04/2015    Thyroid disease      Social History     Socioeconomic History    Marital status:      Spouse name: Not on file    Number of children: Not on file    Years of education: Not on file    Highest education level: Not on file   Occupational History     Not on file   Social Needs    Financial resource strain: Not on file    Food insecurity     Worry: Not on file     Inability: Not on file    Transportation needs     Medical: Not on file     Non-medical: Not on file   Tobacco Use    Smoking status: Former Smoker     Quit date: 1972     Years since quittin.3    Smokeless tobacco: Never Used   Substance and Sexual Activity    Alcohol use: Yes     Comment: occassional    Drug use: No    Sexual activity: Yes   Lifestyle    Physical activity     Days per week: Not on file     Minutes per session: Not on file    Stress: Not at all   Relationships    Social connections     Talks on phone: Not on file     Gets together: Not on file     Attends Evangelical service: Not on file     Active member of club or organization: Not on file     Attends meetings of clubs or organizations: Not on file     Relationship status: Not on file   Other Topics Concern    Not on file   Social History Narrative    Not on file     Past Surgical History:   Procedure Laterality Date    ACHILLES TENDON SURGERY      APPENDECTOMY      DUPUYTREN CONTRACTURE RELEASE  2014    LUMBAR EPIDURAL INJECTION      PROSTATE SURGERY      SACROILIAC JOINT INJECTION      TONSILLECTOMY      TRANSURETHRAL RESECTION OF PROSTATE       Family History   Problem Relation Age of Onset    COPD Father      Current Outpatient Medications   Medication Sig Dispense Refill    buPROPion (WELLBUTRIN) 75 MG tablet Take 2 tablets (150 mg total) by mouth 3 (three) times daily. 540 tablet 2    CALCIUM CITRATE/VITAMIN D3 (CITRACAL + D ORAL) Take by mouth. Taking 2 tablets by mouth daily      fluticasone propionate (FLONASE) 50 mcg/actuation nasal spray SPRAY ONE SPRAY IN EACH NOSTRIL ONCE A DAY 16 mL 5    glucosamine-chondroit-vit C-Mn (GLUCOSAMINE CHONDROITIN MAXSTR) 500-400 mg Cap Take 1 tablet by mouth 3 (three) times daily. 3 Capsule Oral Every day      levothyroxine (SYNTHROID) 50 MCG  tablet Take 1 tablet (50 mcg total) by mouth once daily. 90 tablet 1    loratadine (CLARITIN) 10 mg tablet Take 10 mg by mouth once daily.      losartan (COZAAR) 25 MG tablet Take 1 tablet (25 mg total) by mouth once daily. 90 tablet 3    montelukast (SINGULAIR) 10 mg tablet TAKE 1 TABLET BY MOUTH EVERY DAY IN THE EVENING 30 tablet 0    naproxen sodium (ANAPROX) 220 MG tablet Take 220 mg by mouth 2 (two) times daily with meals. 1 Tablet Oral Twice a day       omega-3 fatty acids 1,000 mg Cap Take by mouth once daily. 1 Capsule Oral Every day      simvastatin (ZOCOR) 20 MG tablet Take 1 tablet (20 mg total) by mouth once daily. 90 tablet 3    sumatriptan (IMITREX) 100 MG tablet Take 1 tablet (100 mg total) by mouth daily as needed for Migraine. 9 tablet 3     No current facility-administered medications for this visit.      Review of patient's allergies indicates:   Allergen Reactions    Demerol  [meperidine]      Agitation  Other reaction(s): Unknown       Review of Systems   Constitutional: Negative for activity change, appetite change, fatigue, fever and malaise/fatigue.   HENT: Negative for congestion, ear pain, hearing loss, postnasal drip, sinus pressure, sinus pain, sneezing and sore throat.    Eyes: Negative for blurred vision, photophobia and pain.   Respiratory: Negative for cough, chest tightness, shortness of breath and wheezing.    Cardiovascular: Negative for chest pain, palpitations, orthopnea, leg swelling and PND.   Gastrointestinal: Negative for abdominal distention, abdominal pain, blood in stool, constipation, diarrhea, nausea and vomiting.   Endocrine: Negative for cold intolerance, heat intolerance, polydipsia and polyuria.   Genitourinary: Negative for difficulty urinating, dysuria, flank pain, frequency, hematuria and urgency.   Musculoskeletal: Negative for arthralgias, back pain, joint swelling, myalgias and neck pain.   Skin: Negative for pallor and rash.   Allergic/Immunologic:  "Negative for environmental allergies and food allergies.   Neurological: Negative for dizziness, weakness, light-headedness and headaches.   Hematological: Does not bruise/bleed easily.   Psychiatric/Behavioral: Negative for confusion, decreased concentration and sleep disturbance. The patient is not nervous/anxious.           Blood pressure 120/78, pulse 74, temperature 97.7 °F (36.5 °C), resp. rate 16, height 6' 2" (1.88 m), weight 93.7 kg (206 lb 8 oz), SpO2 97 %. Body mass index is 26.51 kg/m².   Objective:      Physical Exam  Vitals signs reviewed.   Constitutional:       General: He is not in acute distress.     Appearance: He is well-developed.   HENT:      Head: Normocephalic and atraumatic.      Right Ear: Tympanic membrane and external ear normal.      Left Ear: Tympanic membrane and external ear normal.      Nose: Nose normal. No congestion or rhinorrhea.      Mouth/Throat:      Mouth: Mucous membranes are moist.      Pharynx: Oropharynx is clear. No oropharyngeal exudate or posterior oropharyngeal erythema.   Eyes:      General:         Right eye: No discharge.         Left eye: No discharge.      Conjunctiva/sclera: Conjunctivae normal.      Pupils: Pupils are equal, round, and reactive to light.   Cardiovascular:      Rate and Rhythm: Normal rate and regular rhythm.      Heart sounds: Normal heart sounds. No murmur.   Pulmonary:      Effort: Pulmonary effort is normal.      Breath sounds: Normal breath sounds.   Skin:     General: Skin is warm and dry.      Capillary Refill: Capillary refill takes less than 2 seconds.   Neurological:      Mental Status: He is oriented to person, place, and time.             Assessment:       1. Familial hypercholesterolemia    2. Essential hypertension         Plan:           Familial hypercholesterolemia  -     Lipid Panel; Future; Expected date: 11/18/2020  Need to get more recent lipid panel to determine if his statin needs to be increased  Essential hypertension  - "     Comprehensive Metabolic Panel; Future; Expected date: 11/18/2020  Blood pressure is well controlled on his Cozaar 25 mg, will continue on his current dose of medications

## 2020-11-30 ENCOUNTER — PATIENT MESSAGE (OUTPATIENT)
Dept: FAMILY MEDICINE | Facility: CLINIC | Age: 80
End: 2020-11-30

## 2020-11-30 DIAGNOSIS — E78.00 HYPERCHOLESTEREMIA: ICD-10-CM

## 2020-12-01 ENCOUNTER — LAB VISIT (OUTPATIENT)
Dept: LAB | Facility: HOSPITAL | Age: 80
End: 2020-12-01
Attending: FAMILY MEDICINE
Payer: MEDICARE

## 2020-12-01 DIAGNOSIS — E03.8 OTHER SPECIFIED HYPOTHYROIDISM: ICD-10-CM

## 2020-12-01 DIAGNOSIS — I10 ESSENTIAL HYPERTENSION: ICD-10-CM

## 2020-12-01 DIAGNOSIS — E78.01 FAMILIAL HYPERCHOLESTEROLEMIA: ICD-10-CM

## 2020-12-01 LAB
ALBUMIN SERPL BCP-MCNC: 4.1 G/DL (ref 3.5–5.2)
ALP SERPL-CCNC: 50 U/L (ref 55–135)
ALT SERPL W/O P-5'-P-CCNC: 26 U/L (ref 10–44)
ANION GAP SERPL CALC-SCNC: 9 MMOL/L (ref 8–16)
AST SERPL-CCNC: 29 U/L (ref 10–40)
BILIRUB SERPL-MCNC: 0.6 MG/DL (ref 0.1–1)
BUN SERPL-MCNC: 24 MG/DL (ref 8–23)
CALCIUM SERPL-MCNC: 9.2 MG/DL (ref 8.7–10.5)
CHLORIDE SERPL-SCNC: 102 MMOL/L (ref 95–110)
CHOLEST SERPL-MCNC: 187 MG/DL (ref 120–199)
CHOLEST/HDLC SERPL: 2.6 {RATIO} (ref 2–5)
CO2 SERPL-SCNC: 29 MMOL/L (ref 23–29)
CREAT SERPL-MCNC: 1.1 MG/DL (ref 0.5–1.4)
EST. GFR  (AFRICAN AMERICAN): >60 ML/MIN/1.73 M^2
EST. GFR  (NON AFRICAN AMERICAN): >60 ML/MIN/1.73 M^2
GLUCOSE SERPL-MCNC: 104 MG/DL (ref 70–110)
HDLC SERPL-MCNC: 72 MG/DL (ref 40–75)
HDLC SERPL: 38.5 % (ref 20–50)
LDLC SERPL CALC-MCNC: 100.4 MG/DL (ref 63–159)
NONHDLC SERPL-MCNC: 115 MG/DL
POTASSIUM SERPL-SCNC: 4.3 MMOL/L (ref 3.5–5.1)
PROT SERPL-MCNC: 7.4 G/DL (ref 6–8.4)
SODIUM SERPL-SCNC: 140 MMOL/L (ref 136–145)
TRIGL SERPL-MCNC: 73 MG/DL (ref 30–150)
TSH SERPL DL<=0.005 MIU/L-ACNC: 4.23 UIU/ML (ref 0.34–5.6)

## 2020-12-01 PROCEDURE — 36415 COLL VENOUS BLD VENIPUNCTURE: CPT

## 2020-12-01 PROCEDURE — 80053 COMPREHEN METABOLIC PANEL: CPT

## 2020-12-01 PROCEDURE — 84443 ASSAY THYROID STIM HORMONE: CPT

## 2020-12-01 PROCEDURE — 80061 LIPID PANEL: CPT

## 2020-12-01 RX ORDER — SIMVASTATIN 20 MG/1
20 TABLET, FILM COATED ORAL DAILY
Qty: 90 TABLET | Refills: 3 | Status: SHIPPED | OUTPATIENT
Start: 2020-12-01 | End: 2021-11-18 | Stop reason: SDUPTHER

## 2021-05-18 ENCOUNTER — OFFICE VISIT (OUTPATIENT)
Dept: FAMILY MEDICINE | Facility: CLINIC | Age: 81
End: 2021-05-18
Payer: MEDICARE

## 2021-05-18 VITALS
WEIGHT: 205.13 LBS | HEART RATE: 65 BPM | SYSTOLIC BLOOD PRESSURE: 122 MMHG | BODY MASS INDEX: 26.33 KG/M2 | TEMPERATURE: 98 F | OXYGEN SATURATION: 99 % | RESPIRATION RATE: 17 BRPM | HEIGHT: 74 IN | DIASTOLIC BLOOD PRESSURE: 78 MMHG

## 2021-05-18 DIAGNOSIS — E78.01 FAMILIAL HYPERCHOLESTEROLEMIA: Primary | ICD-10-CM

## 2021-05-18 DIAGNOSIS — I10 ESSENTIAL HYPERTENSION: ICD-10-CM

## 2021-05-18 DIAGNOSIS — E07.9 THYROID DISEASE: ICD-10-CM

## 2021-05-18 PROCEDURE — 99214 PR OFFICE/OUTPT VISIT, EST, LEVL IV, 30-39 MIN: ICD-10-PCS | Mod: S$PBB,,, | Performed by: FAMILY MEDICINE

## 2021-05-18 PROCEDURE — 99214 OFFICE O/P EST MOD 30 MIN: CPT | Mod: S$PBB,,, | Performed by: FAMILY MEDICINE

## 2021-05-18 PROCEDURE — 99215 OFFICE O/P EST HI 40 MIN: CPT | Performed by: FAMILY MEDICINE

## 2021-09-08 NOTE — TELEPHONE ENCOUNTER
----- Message from Henrietta Lovell MD sent at 11/26/2017  8:36 AM CST -----  Most lab results look normal except for the venous gas they day for the true - ionized calcium - make ov to discuss the next step and what this likely means   Patient call call center-it went to nurse triage & patient was having shortness of chest tightness,SOB. Advised him to go to ED.  No availability at office

## 2021-11-10 ENCOUNTER — PATIENT MESSAGE (OUTPATIENT)
Dept: FAMILY MEDICINE | Facility: CLINIC | Age: 81
End: 2021-11-10
Payer: MEDICARE

## 2021-11-11 ENCOUNTER — LAB VISIT (OUTPATIENT)
Dept: LAB | Facility: HOSPITAL | Age: 81
End: 2021-11-11
Attending: FAMILY MEDICINE
Payer: MEDICARE

## 2021-11-11 DIAGNOSIS — E78.01 FAMILIAL HYPERCHOLESTEROLEMIA: ICD-10-CM

## 2021-11-11 DIAGNOSIS — E07.9 THYROID DISEASE: ICD-10-CM

## 2021-11-11 DIAGNOSIS — I10 ESSENTIAL HYPERTENSION: ICD-10-CM

## 2021-11-11 LAB
ALBUMIN SERPL BCP-MCNC: 3.8 G/DL (ref 3.5–5.2)
ALP SERPL-CCNC: 51 U/L (ref 55–135)
ALT SERPL W/O P-5'-P-CCNC: 24 U/L (ref 10–44)
ANION GAP SERPL CALC-SCNC: 6 MMOL/L (ref 8–16)
AST SERPL-CCNC: 31 U/L (ref 10–40)
BILIRUB SERPL-MCNC: 0.8 MG/DL (ref 0.1–1)
BUN SERPL-MCNC: 25 MG/DL (ref 8–23)
CALCIUM SERPL-MCNC: 8.7 MG/DL (ref 8.7–10.5)
CHLORIDE SERPL-SCNC: 104 MMOL/L (ref 95–110)
CHOLEST SERPL-MCNC: 178 MG/DL (ref 120–199)
CHOLEST/HDLC SERPL: 2.6 {RATIO} (ref 2–5)
CO2 SERPL-SCNC: 27 MMOL/L (ref 23–29)
CREAT SERPL-MCNC: 1.1 MG/DL (ref 0.5–1.4)
EST. GFR  (AFRICAN AMERICAN): >60 ML/MIN/1.73 M^2
EST. GFR  (NON AFRICAN AMERICAN): >60 ML/MIN/1.73 M^2
GLUCOSE SERPL-MCNC: 107 MG/DL (ref 70–110)
HDLC SERPL-MCNC: 69 MG/DL (ref 40–75)
HDLC SERPL: 38.8 % (ref 20–50)
LDLC SERPL CALC-MCNC: 95.8 MG/DL (ref 63–159)
NONHDLC SERPL-MCNC: 109 MG/DL
POTASSIUM SERPL-SCNC: 4.2 MMOL/L (ref 3.5–5.1)
PROT SERPL-MCNC: 7.2 G/DL (ref 6–8.4)
SODIUM SERPL-SCNC: 137 MMOL/L (ref 136–145)
TRIGL SERPL-MCNC: 66 MG/DL (ref 30–150)
TSH SERPL DL<=0.005 MIU/L-ACNC: 3.17 UIU/ML (ref 0.34–5.6)

## 2021-11-11 PROCEDURE — 80061 LIPID PANEL: CPT | Performed by: FAMILY MEDICINE

## 2021-11-11 PROCEDURE — 36415 COLL VENOUS BLD VENIPUNCTURE: CPT | Performed by: FAMILY MEDICINE

## 2021-11-11 PROCEDURE — 80053 COMPREHEN METABOLIC PANEL: CPT | Performed by: FAMILY MEDICINE

## 2021-11-11 PROCEDURE — 84443 ASSAY THYROID STIM HORMONE: CPT | Performed by: FAMILY MEDICINE

## 2021-11-18 ENCOUNTER — OFFICE VISIT (OUTPATIENT)
Dept: FAMILY MEDICINE | Facility: CLINIC | Age: 81
End: 2021-11-18
Payer: MEDICARE

## 2021-11-18 VITALS
DIASTOLIC BLOOD PRESSURE: 72 MMHG | BODY MASS INDEX: 26.37 KG/M2 | TEMPERATURE: 98 F | HEART RATE: 62 BPM | OXYGEN SATURATION: 96 % | HEIGHT: 74 IN | SYSTOLIC BLOOD PRESSURE: 122 MMHG | WEIGHT: 205.5 LBS

## 2021-11-18 DIAGNOSIS — R53.82 CHRONIC FATIGUE: Primary | ICD-10-CM

## 2021-11-18 DIAGNOSIS — I10 ESSENTIAL HYPERTENSION: ICD-10-CM

## 2021-11-18 DIAGNOSIS — E78.00 HYPERCHOLESTEREMIA: ICD-10-CM

## 2021-11-18 DIAGNOSIS — E03.8 SUBCLINICAL HYPOTHYROIDISM: ICD-10-CM

## 2021-11-18 PROCEDURE — 99214 OFFICE O/P EST MOD 30 MIN: CPT | Mod: S$PBB,,, | Performed by: FAMILY MEDICINE

## 2021-11-18 PROCEDURE — 99214 PR OFFICE/OUTPT VISIT, EST, LEVL IV, 30-39 MIN: ICD-10-PCS | Mod: S$PBB,,, | Performed by: FAMILY MEDICINE

## 2021-11-18 PROCEDURE — 99214 OFFICE O/P EST MOD 30 MIN: CPT | Performed by: FAMILY MEDICINE

## 2021-11-18 RX ORDER — SIMVASTATIN 20 MG/1
20 TABLET, FILM COATED ORAL DAILY
Qty: 90 TABLET | Refills: 3 | Status: SHIPPED | OUTPATIENT
Start: 2021-11-18 | End: 2022-01-19 | Stop reason: SDUPTHER

## 2021-12-11 ENCOUNTER — LAB VISIT (OUTPATIENT)
Dept: LAB | Facility: HOSPITAL | Age: 81
End: 2021-12-11
Attending: FAMILY MEDICINE
Payer: MEDICARE

## 2021-12-11 DIAGNOSIS — R53.82 CHRONIC FATIGUE: ICD-10-CM

## 2021-12-11 LAB
BASOPHILS # BLD AUTO: 0.07 K/UL (ref 0–0.2)
BASOPHILS NFR BLD: 1 % (ref 0–1.9)
DIFFERENTIAL METHOD: ABNORMAL
EOSINOPHIL # BLD AUTO: 0.2 K/UL (ref 0–0.5)
EOSINOPHIL NFR BLD: 3.1 % (ref 0–8)
ERYTHROCYTE [DISTWIDTH] IN BLOOD BY AUTOMATED COUNT: 12.7 % (ref 11.5–14.5)
HCT VFR BLD AUTO: 42.1 % (ref 40–54)
HGB BLD-MCNC: 13.9 G/DL (ref 14–18)
IMM GRANULOCYTES # BLD AUTO: 0.02 K/UL (ref 0–0.04)
IMM GRANULOCYTES NFR BLD AUTO: 0.3 % (ref 0–0.5)
LYMPHOCYTES # BLD AUTO: 1.8 K/UL (ref 1–4.8)
LYMPHOCYTES NFR BLD: 25.3 % (ref 18–48)
MCH RBC QN AUTO: 31.4 PG (ref 27–31)
MCHC RBC AUTO-ENTMCNC: 33 G/DL (ref 32–36)
MCV RBC AUTO: 95 FL (ref 82–98)
MONOCYTES # BLD AUTO: 0.8 K/UL (ref 0.3–1)
MONOCYTES NFR BLD: 11.9 % (ref 4–15)
NEUTROPHILS # BLD AUTO: 4.1 K/UL (ref 1.8–7.7)
NEUTROPHILS NFR BLD: 58.4 % (ref 38–73)
NRBC BLD-RTO: 0 /100 WBC
PLATELET # BLD AUTO: 243 K/UL (ref 150–450)
PMV BLD AUTO: 8.9 FL (ref 9.2–12.9)
RBC # BLD AUTO: 4.43 M/UL (ref 4.6–6.2)
WBC # BLD AUTO: 7.04 K/UL (ref 3.9–12.7)

## 2021-12-11 PROCEDURE — 85025 COMPLETE CBC W/AUTO DIFF WBC: CPT | Performed by: FAMILY MEDICINE

## 2021-12-11 PROCEDURE — 84403 ASSAY OF TOTAL TESTOSTERONE: CPT | Performed by: FAMILY MEDICINE

## 2021-12-11 PROCEDURE — 36415 COLL VENOUS BLD VENIPUNCTURE: CPT | Performed by: FAMILY MEDICINE

## 2021-12-13 LAB — TESTOST SERPL-MCNC: 244 NG/DL (ref 264–916)

## 2021-12-16 ENCOUNTER — TELEPHONE (OUTPATIENT)
Dept: FAMILY MEDICINE | Facility: CLINIC | Age: 81
End: 2021-12-16
Payer: MEDICARE

## 2022-01-18 ENCOUNTER — PATIENT MESSAGE (OUTPATIENT)
Dept: FAMILY MEDICINE | Facility: CLINIC | Age: 82
End: 2022-01-18
Payer: MEDICARE

## 2022-01-18 DIAGNOSIS — E78.00 HYPERCHOLESTEREMIA: ICD-10-CM

## 2022-01-18 DIAGNOSIS — I10 ESSENTIAL HYPERTENSION: ICD-10-CM

## 2022-01-18 DIAGNOSIS — E07.9 THYROID DISEASE: ICD-10-CM

## 2022-01-18 DIAGNOSIS — R51.9 GENERALIZED HEADACHES: ICD-10-CM

## 2022-01-19 RX ORDER — BUPROPION HYDROCHLORIDE 75 MG/1
150 TABLET ORAL 3 TIMES DAILY
Qty: 540 TABLET | Refills: 2 | Status: SHIPPED | OUTPATIENT
Start: 2022-01-19 | End: 2022-08-26

## 2022-01-19 RX ORDER — SIMVASTATIN 20 MG/1
20 TABLET, FILM COATED ORAL DAILY
Qty: 90 TABLET | Refills: 3 | Status: SHIPPED | OUTPATIENT
Start: 2022-01-19 | End: 2022-10-22 | Stop reason: SDUPTHER

## 2022-01-19 RX ORDER — LOSARTAN POTASSIUM 25 MG/1
25 TABLET ORAL DAILY
Qty: 90 TABLET | Refills: 3 | Status: SHIPPED | OUTPATIENT
Start: 2022-01-19 | End: 2022-10-22 | Stop reason: SDUPTHER

## 2022-01-19 RX ORDER — LEVOTHYROXINE SODIUM 50 UG/1
50 TABLET ORAL DAILY
Qty: 90 TABLET | Refills: 1 | Status: SHIPPED | OUTPATIENT
Start: 2022-01-19 | End: 2022-08-26

## 2022-01-19 RX ORDER — SUMATRIPTAN SUCCINATE 100 MG/1
100 TABLET ORAL DAILY PRN
Qty: 9 TABLET | Refills: 3 | Status: SHIPPED | OUTPATIENT
Start: 2022-01-19 | End: 2022-08-26

## 2022-01-21 NOTE — PATIENT INSTRUCTIONS
Nail Fungal Infection  A nail fungal infection changes the way fingernails and toenails look. They may thicken, discolor, change shape, or split. This condition is hard to treat because nails grow slowly and have limited blood supply. The infection often comes back after treatment.  There are 2 types of medicines used to treat this condition:  · Topical anti-fungal medicines. These are applied to the surface of the skin and nail area. These medicines are not very effective because they cant get deep into the nail.  · Oral antifungal medicines. These medicines work better because they go into the nail from the inside out. But the infection may still come back. It may take 9 to 12 months for your nail to look normal again. This means you are cured. You can repeat treatment if needed. Most people take these medicines without any problems. It is rare to stop therapy because of side effects. But your healthcare provider may give you some monitoring tests. Talk about possible side effects with your provider before starting treatment.  If medicines fail, the nail can be removed surgically or chemically. These methods physically remove the fungus from the body. This helps medical treatment be more effective.  Home care  · Use medicines exactly as directed for as long as directed. Treating a fungal infection can take longer than other kinds of infections.  · Smoking is a risk factor for fungal infection. This is one more reason to quit.  · Wear absorbent socks, and shoes that let your feet breathe. Sweaty feet increase your risk of fungal infection. They also make an existing infection harder to treat.  · Use footwear when in damp public places like swimming pools, gyms, and shower rooms. This will help you avoid the fungus that grows there.  · Don't share nail clippers or scissors with others.  Follow-up care  Follow up with your healthcare provider, or as advised.  When to seek medical advice  Call your healthcare  provider right away if any of these occur:  · Skin by the nail becomes red, swollen, painful, or drains pus (a creamy yellow or white liquid)  · Side effects from oral anti-fungal medicines  Date Last Reviewed: 8/1/2016 © 2000-2017 [employer]. [address] [place] [address]. All rights reserved. This information is not intended as a substitute for professional medical care. Always follow your healthcare professional's instructions.          Athletes Foot    Athletes foot (tinea pedis) is caused by a fungal infection in the skin. It affects the skin between the toes, causing cracks in the skin called fissures. It can also affect the bottom of the foot where it causes dry white scales and peeling of the skin. This infection is more likely to occur when the foot is in hot, sweaty socks and shoes for long periods of time. You may feel itching and burning between your toes. This infection is treated with skin creams or medicine taken by mouth.    Home care  The following are general care guidelines:  · It is important to keep the feet dry. Use absorbent cotton socks and change them if they become sweaty. Or wear an open-toe shoe or sandal. Wash the feet at least once a day with soap and water.  · Apply the antifungal cream as prescribed. Some antifungal creams are available without a prescription.  · It may take a week before the rash starts to improve. It can take about 3 to 4 weeks to completely clear. Continue the medicine until the rash is all gone.  · Use over-the-counter antifungal powders or sprays on your feet after exposure to high-risk environments, such as public showers, gyms, and locker rooms. This can help prevent future infections. Wearing appropriate shoes in these situations can help.    Prevention  The following tips may help prevent athletes foot:  · Don't share shoes or socks with someone who has athlete's foot.  · Don't walk barefoot in places where a fungal infection  can spread quickly such as locker rooms, showers, and swimming pools.  · Change socks regularly.  · Alternate shoes to assist in drying.    Follow-up care  Follow up with your healthcare provider as recommended if the rash does not improve after 10 days of treatment, or if the rash continues to spread.    When to seek medical care  Get medical attention right away if any of the following occur:  · Fever of 100.4°F (38°C) or higher, or as directed  · Increasing redness or swelling of the foot  · Infection comes back soon after treatment  · Pus draining from cracks in the skin  ·   Date Last Reviewed: 8/1/2016  © 6818-9940 [employer] [address] [place] [address], [place] [address]. All rights reserved. This information is not intended as a substitute for professional medical care. Always follow your healthcare professional's instructions.

## 2022-01-24 ENCOUNTER — OFFICE VISIT (OUTPATIENT)
Dept: PODIATRY | Facility: CLINIC | Age: 82
End: 2022-01-24
Payer: MEDICARE

## 2022-01-24 VITALS
BODY MASS INDEX: 27.14 KG/M2 | DIASTOLIC BLOOD PRESSURE: 68 MMHG | RESPIRATION RATE: 16 BRPM | HEART RATE: 60 BPM | SYSTOLIC BLOOD PRESSURE: 120 MMHG | WEIGHT: 211.5 LBS | HEIGHT: 74 IN | OXYGEN SATURATION: 99 %

## 2022-01-24 DIAGNOSIS — L60.2 OG (ONYCHOGRYPHOSIS): ICD-10-CM

## 2022-01-24 DIAGNOSIS — B35.3 TINEA PEDIS OF BOTH FEET: Primary | ICD-10-CM

## 2022-01-24 PROCEDURE — 99203 PR OFFICE/OUTPT VISIT, NEW, LEVL III, 30-44 MIN: ICD-10-PCS | Mod: S$GLB,,, | Performed by: PODIATRIST

## 2022-01-24 PROCEDURE — 99203 OFFICE O/P NEW LOW 30 MIN: CPT | Mod: S$GLB,,, | Performed by: PODIATRIST

## 2022-01-24 RX ORDER — EFINACONAZOLE 100 MG/ML
1 SOLUTION TOPICAL DAILY
Qty: 8 ML | Refills: 1 | Status: SHIPPED | OUTPATIENT
Start: 2022-01-24 | End: 2023-01-27

## 2022-01-24 RX ORDER — CLOTRIMAZOLE AND BETAMETHASONE DIPROPIONATE 10; .64 MG/G; MG/G
CREAM TOPICAL 2 TIMES DAILY
Qty: 45 G | Refills: 3 | Status: SHIPPED | OUTPATIENT
Start: 2022-01-24 | End: 2023-01-27

## 2022-01-24 NOTE — PROGRESS NOTES
"  [address] [facility] [address] [place] [address]  Phone: [phone]   [phone]    Patient's PCP:Baltazar Greer MD  Referring Provider: Aaareferral Self    Subjective:      Chief Complaint:: Nail Problem (Fungal nails) and Tinea Pedis (Bilateral feet)    HPI  Gilberto Lee is a 81 y.o. male who presents with a complaint of bilateral fungal nails and athletes foot lasting for some time. Onset of symptoms nail discoloration thickening and peeling skin reports no trauma.  Current symptoms include  nail discoloration thickening and peeling skin.  Aggravating factors are none. Symptoms have waxed and weaned. Treatment to date have included jublia, and lamisil.    Systemic Doctor: Baltazar Greer MD  Date Last Seen:11/18/32021    Vitals:    01/24/22 0844   BP: 120/68   Pulse: 60   Resp: 16   SpO2: 99%   Weight: 95.9 kg (211 lb 8 oz)   Height: 6' 2" (1.88 m)   PainSc: 0-No pain      Shoe Size: 12    Past Surgical History:   Procedure Laterality Date    ACHILLES TENDON SURGERY      APPENDECTOMY      DUPUYTREN CONTRACTURE RELEASE  2014    LUMBAR EPIDURAL INJECTION  2013    PROSTATE SURGERY      SACROILIAC JOINT INJECTION      TONSILLECTOMY      TRANSURETHRAL RESECTION OF PROSTATE       Past Medical History:   Diagnosis Date    Arthritis     Atelectasis of left lung 07/2015    Bradycardia 07/17/2015    Calcium blood increased 08/09/2016    Depression     Diastolic dysfunction without heart failure 07/2015    Elevated BUN 08/09/2016    Fatigue 04/29/2015    Hyperglycemia 08/09/2016    Hyperlipidemia     Migraine without aura 10/2015    Prehypertension 11/2016    Testosterone deficiency 04/2015    Thyroid disease      Family History   Problem Relation Age of Onset    COPD Father         Social History:   Marital Status:   Alcohol History:  reports current alcohol use.  Tobacco History:  reports that he quit smoking about 49 years ago. He has never used smokeless tobacco.  Drug History:  " reports no history of drug use.    Review of patient's allergies indicates:   Allergen Reactions    Demerol  [meperidine]      Agitation  Other reaction(s): Unknown       Current Outpatient Medications   Medication Sig Dispense Refill    buPROPion (WELLBUTRIN) 75 MG tablet Take 2 tablets (150 mg total) by mouth 3 (three) times daily. 540 tablet 2    CALCIUM CITRATE/VITAMIN D3 (CITRACAL + D ORAL) Take by mouth. Taking 2 tablets by mouth daily      fluticasone propionate (FLONASE) 50 mcg/actuation nasal spray SPRAY ONE SPRAY IN EACH NOSTRIL ONCE A DAY 48 mL 1    glucosamine-chondroit-vit C-Mn 500-400 mg capsule Take 1 tablet by mouth 3 (three) times daily. 3 Capsule Oral Every day      levothyroxine (SYNTHROID) 50 MCG tablet Take 1 tablet (50 mcg total) by mouth once daily. 90 tablet 1    loratadine (CLARITIN) 10 mg tablet Take 10 mg by mouth once daily.      losartan (COZAAR) 25 MG tablet Take 1 tablet (25 mg total) by mouth once daily. 90 tablet 3    naproxen sodium (ANAPROX) 220 MG tablet Take 220 mg by mouth 2 (two) times daily with meals. 1 Tablet Oral Twice a day       omega-3 fatty acids 1,000 mg Cap Take by mouth once daily. 1 Capsule Oral Every day      simvastatin (ZOCOR) 20 MG tablet Take 1 tablet (20 mg total) by mouth once daily. 90 tablet 3    sumatriptan (IMITREX) 100 MG tablet Take 1 tablet (100 mg total) by mouth daily as needed for Migraine. 9 tablet 3     No current facility-administered medications for this visit.       Review of Systems   Constitutional: Negative for chills, fatigue, fever and unexpected weight change.   HENT: Positive for trouble swallowing. Negative for hearing loss.    Eyes: Negative for photophobia and visual disturbance.   Respiratory: Negative for cough, shortness of breath and wheezing.    Cardiovascular: Negative for chest pain, palpitations and leg swelling.   Gastrointestinal: Negative for abdominal pain and nausea.   Genitourinary: Negative for dysuria and  frequency.   Musculoskeletal: Positive for back pain. Negative for arthralgias, gait problem, joint swelling and myalgias.   Skin: Positive for color change. Negative for rash and wound.   Neurological: Negative for tremors, seizures, weakness, numbness and headaches.   Hematological: Does not bruise/bleed easily.         Objective:        Physical Exam:   Foot Exam    General  General Appearance: appears stated age and healthy   Orientation: alert and oriented to person, place, and time   Affect: appropriate   Gait: unimpaired       Right Foot/Ankle     Inspection and Palpation  Ecchymosis: none  Tenderness: none   Swelling: none   Arch: normal  Hammertoes: absent  Claw Toes: absent  Hallux valgus: yes  Hallux limitus: no  Skin Exam: tinea;   Fungus Toenails: present    Neurovascular  Dorsalis pedis: 2+  Posterior tibial: 2+  Capillary Refill: 2+  Saphenous nerve sensation: normal  Tibial nerve sensation: normal  Superficial peroneal nerve sensation: normal  Deep peroneal nerve sensation: normal  Sural nerve sensation: normal      Left Foot/Ankle      Inspection and Palpation  Ecchymosis: none  Tenderness: none   Swelling: none   Arch: normal  Hammertoes: absent  Claw toes: absent  Hallux valgus: yes  Hallux limitus: no  Skin Exam: tinea;   Fungus Toenails: present    Neurovascular  Dorsalis pedis: 2+  Posterior tibial: 2+  Capillary refill: 2+  Saphenous nerve sensation: normal  Tibial nerve sensation: normal  Superficial peroneal nerve sensation: normal  Deep peroneal nerve sensation: normal  Sural nerve sensation: normal          Physical Exam  Cardiovascular:      Pulses:           Dorsalis pedis pulses are 2+ on the right side and 2+ on the left side.        Posterior tibial pulses are 2+ on the right side and 2+ on the left side.   Musculoskeletal:      Right foot: Bunion present.      Left foot: Bunion present.   Feet:      Right foot:      Toenail Condition: Fungal disease present.     Left foot:       Toenail Condition: Fungal disease present.        Imaging:            Assessment:       1. Tinea pedis of both feet    2. OG (onychogryphosis)      Plan:   Tinea pedis of both feet    OG (onychogryphosis)    Had a discussion with the patient about the tinea pedis and onychomycosis.  Giving prescription for Lotrisone and he will continue Jublia.    Fungal infection of toenails explained. Treatment options including no treatment, periodic debridement, topical medications, oral medications, and removal of the nail were discussed, as well as success rates and risks of recurrence. We agreed on topical medication  Return as needed    Procedures          Counseling:     I provided patient education verbally regarding:   Patient diagnosis, treatment options, as well as alternatives, risks, and benefits.     This note was created using Dragon voice recognition software that occasionally misinterpreted phrases or words.

## 2022-05-12 ENCOUNTER — PATIENT MESSAGE (OUTPATIENT)
Dept: FAMILY MEDICINE | Facility: CLINIC | Age: 82
End: 2022-05-12

## 2022-05-12 DIAGNOSIS — R53.82 CHRONIC FATIGUE: ICD-10-CM

## 2022-05-12 DIAGNOSIS — E03.8 SUBCLINICAL HYPOTHYROIDISM: Primary | ICD-10-CM

## 2022-05-16 ENCOUNTER — PATIENT MESSAGE (OUTPATIENT)
Dept: FAMILY MEDICINE | Facility: CLINIC | Age: 82
End: 2022-05-16

## 2022-05-17 ENCOUNTER — LAB VISIT (OUTPATIENT)
Dept: LAB | Facility: HOSPITAL | Age: 82
End: 2022-05-17
Attending: FAMILY MEDICINE
Payer: MEDICARE

## 2022-05-17 DIAGNOSIS — E03.8 SUBCLINICAL HYPOTHYROIDISM: ICD-10-CM

## 2022-05-17 DIAGNOSIS — R53.82 CHRONIC FATIGUE: ICD-10-CM

## 2022-05-17 LAB
ALBUMIN SERPL BCP-MCNC: 4 G/DL (ref 3.5–5.2)
ALP SERPL-CCNC: 45 U/L (ref 55–135)
ALT SERPL W/O P-5'-P-CCNC: 25 U/L (ref 10–44)
ANION GAP SERPL CALC-SCNC: 12 MMOL/L (ref 8–16)
AST SERPL-CCNC: 28 U/L (ref 10–40)
BASOPHILS # BLD AUTO: 0.07 K/UL (ref 0–0.2)
BASOPHILS NFR BLD: 1 % (ref 0–1.9)
BILIRUB SERPL-MCNC: 0.6 MG/DL (ref 0.1–1)
BUN SERPL-MCNC: 26 MG/DL (ref 8–23)
CALCIUM SERPL-MCNC: 9.2 MG/DL (ref 8.7–10.5)
CHLORIDE SERPL-SCNC: 100 MMOL/L (ref 95–110)
CO2 SERPL-SCNC: 26 MMOL/L (ref 23–29)
CREAT SERPL-MCNC: 1 MG/DL (ref 0.5–1.4)
DIFFERENTIAL METHOD: ABNORMAL
EOSINOPHIL # BLD AUTO: 0.2 K/UL (ref 0–0.5)
EOSINOPHIL NFR BLD: 2.8 % (ref 0–8)
ERYTHROCYTE [DISTWIDTH] IN BLOOD BY AUTOMATED COUNT: 12.6 % (ref 11.5–14.5)
EST. GFR  (AFRICAN AMERICAN): >60 ML/MIN/1.73 M^2
EST. GFR  (NON AFRICAN AMERICAN): >60 ML/MIN/1.73 M^2
GLUCOSE SERPL-MCNC: 112 MG/DL (ref 70–110)
HCT VFR BLD AUTO: 43.9 % (ref 40–54)
HGB BLD-MCNC: 14.9 G/DL (ref 14–18)
IMM GRANULOCYTES # BLD AUTO: 0.01 K/UL (ref 0–0.04)
IMM GRANULOCYTES NFR BLD AUTO: 0.1 % (ref 0–0.5)
LYMPHOCYTES # BLD AUTO: 2.2 K/UL (ref 1–4.8)
LYMPHOCYTES NFR BLD: 31.3 % (ref 18–48)
MCH RBC QN AUTO: 31.6 PG (ref 27–31)
MCHC RBC AUTO-ENTMCNC: 33.9 G/DL (ref 32–36)
MCV RBC AUTO: 93 FL (ref 82–98)
MONOCYTES # BLD AUTO: 0.7 K/UL (ref 0.3–1)
MONOCYTES NFR BLD: 10 % (ref 4–15)
NEUTROPHILS # BLD AUTO: 3.9 K/UL (ref 1.8–7.7)
NEUTROPHILS NFR BLD: 54.8 % (ref 38–73)
NRBC BLD-RTO: 0 /100 WBC
PLATELET # BLD AUTO: 229 K/UL (ref 150–450)
PMV BLD AUTO: 8.7 FL (ref 9.2–12.9)
POTASSIUM SERPL-SCNC: 4.2 MMOL/L (ref 3.5–5.1)
PROT SERPL-MCNC: 7.2 G/DL (ref 6–8.4)
PTH-INTACT SERPL-MCNC: 39 PG/ML (ref 9–77)
RBC # BLD AUTO: 4.72 M/UL (ref 4.6–6.2)
SODIUM SERPL-SCNC: 138 MMOL/L (ref 136–145)
TSH SERPL DL<=0.005 MIU/L-ACNC: 3.74 UIU/ML (ref 0.34–5.6)
WBC # BLD AUTO: 7.03 K/UL (ref 3.9–12.7)

## 2022-05-17 PROCEDURE — 85025 COMPLETE CBC W/AUTO DIFF WBC: CPT | Performed by: FAMILY MEDICINE

## 2022-05-17 PROCEDURE — 84403 ASSAY OF TOTAL TESTOSTERONE: CPT | Performed by: FAMILY MEDICINE

## 2022-05-17 PROCEDURE — 36415 COLL VENOUS BLD VENIPUNCTURE: CPT | Performed by: FAMILY MEDICINE

## 2022-05-17 PROCEDURE — 83970 ASSAY OF PARATHORMONE: CPT | Performed by: FAMILY MEDICINE

## 2022-05-17 PROCEDURE — 80053 COMPREHEN METABOLIC PANEL: CPT | Performed by: FAMILY MEDICINE

## 2022-05-17 PROCEDURE — 84443 ASSAY THYROID STIM HORMONE: CPT | Performed by: FAMILY MEDICINE

## 2022-05-18 LAB — TESTOST SERPL-MCNC: 357 NG/DL (ref 264–916)

## 2022-05-19 ENCOUNTER — OFFICE VISIT (OUTPATIENT)
Dept: FAMILY MEDICINE | Facility: CLINIC | Age: 82
End: 2022-05-19
Payer: MEDICARE

## 2022-05-19 VITALS
WEIGHT: 204.63 LBS | OXYGEN SATURATION: 96 % | DIASTOLIC BLOOD PRESSURE: 72 MMHG | BODY MASS INDEX: 26.26 KG/M2 | SYSTOLIC BLOOD PRESSURE: 110 MMHG | HEART RATE: 68 BPM | HEIGHT: 74 IN | TEMPERATURE: 98 F

## 2022-05-19 DIAGNOSIS — B35.1 ONYCHOMYCOSIS: ICD-10-CM

## 2022-05-19 DIAGNOSIS — R73.9 HYPERGLYCEMIA: Primary | ICD-10-CM

## 2022-05-19 DIAGNOSIS — I10 ESSENTIAL HYPERTENSION: ICD-10-CM

## 2022-05-19 DIAGNOSIS — E07.9 THYROID DISEASE: ICD-10-CM

## 2022-05-19 DIAGNOSIS — E78.01 FAMILIAL HYPERCHOLESTEROLEMIA: ICD-10-CM

## 2022-05-19 DIAGNOSIS — R53.83 FATIGUE, UNSPECIFIED TYPE: ICD-10-CM

## 2022-05-19 PROCEDURE — 99214 PR OFFICE/OUTPT VISIT, EST, LEVL IV, 30-39 MIN: ICD-10-PCS | Mod: S$PBB,AQ,, | Performed by: FAMILY MEDICINE

## 2022-05-19 PROCEDURE — 99215 OFFICE O/P EST HI 40 MIN: CPT | Performed by: FAMILY MEDICINE

## 2022-05-19 PROCEDURE — 99214 OFFICE O/P EST MOD 30 MIN: CPT | Mod: S$PBB,AQ,, | Performed by: FAMILY MEDICINE

## 2022-05-19 RX ORDER — CYANOCOBALAMIN 1000 UG/ML
1000 INJECTION, SOLUTION INTRAMUSCULAR; SUBCUTANEOUS ONCE
Qty: 1 ML | Refills: 0 | Status: SHIPPED | OUTPATIENT
Start: 2022-05-19 | End: 2022-06-22

## 2022-05-19 RX ORDER — FLUCONAZOLE 150 MG/1
150 TABLET ORAL
Qty: 24 TABLET | Refills: 0 | Status: SHIPPED | OUTPATIENT
Start: 2022-05-19 | End: 2022-05-20

## 2022-05-19 NOTE — PROGRESS NOTES
SUBJECTIVE:    Patient ID: Gilberto Lee is a 81 y.o. male.    Chief Complaint: Diabetes, Hypertension (6 month f/u), and Fatigue (Intermittent for several years.)  82 yo male here today to follow-up on his chronic medical conditions and his most recent labs.  Patient is feeling well he has no complaints today and does not need any medications refilled.  Patient states that approximately every 3 weeks he gets about of fatigued at last about 3 days he is unsure what is causing it he states his feels like when his parathyroid gland was acting up recheck his PTH level and his calcium level they were normal.     His blood pressure today is well controlled  His lipids are controlled currently on simvastatin 20 mg  With recent TSH is within normal limits     I reviewed his overdue health maintenance patient is due for hepatitis-C screening, shingles vaccine, COVID-19 vaccine     SPMHx:  Depression:  Wellbutrin 75mg   HTN: On Losartan 25mg  Hyperlipidemia: Simvastatin 20mg  Hypothyroid: On Synthroid 50mcg has been on the same dose for > 1 year.  Allergies:  Loratadine 10mg  Hx of BPH: S/P TURP      Specialists:  Psych: Dr Upton  GI:  Dr Melton  Derm: Dr Calixto     Smoke: None quit in 1975  ETOH: 1-2 drinks a day  Exercise: aerobic activity 3 days a week    Glucose 70 - 110 mg/dL 112 High      Testosterone 264 - 916 ng/dL 357      H/H  14/43    TSH 0.340 - 5.600 uIU/mL 3.740         PTH, Intact 9.0 - 77.0 pg/mL 39.0        Depression  Visit Type: initial  Onset of symptoms: more than 5 years ago  Progression since onset: controlled  Patient presents with the following symptoms: fatigue.  Patient is not experiencing: anhedonia, chest pain, choking sensation, compulsions, confusion, decreased concentration, depressed mood, dizziness, dry mouth, excessive worry, feelings of hopelessness, feelings of worthlessness, hypersomnia, hyperventilation, impotence, insomnia, irritability, malaise, memory impairment, muscle  tension, nausea, nervousness/anxiety, obsessions, palpitations, panic, psychomotor agitation, psychomotor retardation, restlessness, shortness of breath, suicidal ideas, suicidal planning, thoughts of death, weight gain and weight loss.  Severity: moderate     Hypertension  This is a chronic problem. The current episode started more than 1 year ago. The problem is unchanged. The problem is controlled. Pertinent negatives include no chest pain, palpitations or shortness of breath. Risk factors for coronary artery disease include dyslipidemia, male gender and obesity. Past treatments include angiotensin blockers. The current treatment provides moderate improvement. Compliance problems include exercise and diet.  Identifiable causes of hypertension include a thyroid problem.   Thyroid Problem  Presents for follow-up visit. Patient reports no anxiety, constipation, depressed mood, diaphoresis, diarrhea, dry skin, fatigue, heat intolerance, hoarse voice, palpitations, weight gain or weight loss. The symptoms have been stable. His past medical history is significant for hyperlipidemia.   Hyperlipidemia  This is a chronic problem. The current episode started more than 1 year ago. The problem is controlled. Recent lipid tests were reviewed and are normal. Exacerbating diseases include hypothyroidism. There are no known factors aggravating his hyperlipidemia. Pertinent negatives include no chest pain or shortness of breath. Current antihyperlipidemic treatment includes statins.         Past Medical History:   Diagnosis Date    Arthritis     Atelectasis of left lung 07/2015    Bradycardia 07/17/2015    Calcium blood increased 08/09/2016    Depression     Diastolic dysfunction without heart failure 07/2015    Elevated BUN 08/09/2016    Fatigue 04/29/2015    Hyperglycemia 08/09/2016    Hyperlipidemia     Migraine without aura 10/2015    Prehypertension 11/2016    Testosterone deficiency 04/2015    Thyroid disease       Social History     Socioeconomic History    Marital status:    Occupational History    Occupation: retired   Tobacco Use    Smoking status: Former Smoker     Quit date: 1972     Years since quittin.8    Smokeless tobacco: Never Used   Substance and Sexual Activity    Alcohol use: Yes     Comment: occassional    Drug use: No    Sexual activity: Yes     Past Surgical History:   Procedure Laterality Date    ACHILLES TENDON SURGERY      APPENDECTOMY      DUPUYTREN CONTRACTURE RELEASE  2014    LUMBAR EPIDURAL INJECTION      PROSTATE SURGERY      SACROILIAC JOINT INJECTION      TONSILLECTOMY      TRANSURETHRAL RESECTION OF PROSTATE       Family History   Problem Relation Age of Onset    COPD Father      Current Outpatient Medications   Medication Sig Dispense Refill    buPROPion (WELLBUTRIN) 75 MG tablet Take 2 tablets (150 mg total) by mouth 3 (three) times daily. 540 tablet 2    CALCIUM CITRATE/VITAMIN D3 (CITRACAL + D ORAL) Take by mouth. Taking 2 tablets by mouth daily      clotrimazole-betamethasone 1-0.05% (LOTRISONE) cream Apply topically 2 (two) times daily. 45 g 3    efinaconazole (JUBLIA) 10 % Jovan Apply 1 Bottle topically once daily. 8 mL 1    fluticasone propionate (FLONASE) 50 mcg/actuation nasal spray SPRAY ONE SPRAY IN EACH NOSTRIL ONCE A DAY 48 mL 1    glucosamine-chondroit-vit C-Mn 500-400 mg capsule Take 1 tablet by mouth 3 (three) times daily. 3 Capsule Oral Every day      levothyroxine (SYNTHROID) 50 MCG tablet Take 1 tablet (50 mcg total) by mouth once daily. 90 tablet 1    loratadine (CLARITIN) 10 mg tablet Take 10 mg by mouth once daily.      losartan (COZAAR) 25 MG tablet Take 1 tablet (25 mg total) by mouth once daily. 90 tablet 3    naproxen sodium (ANAPROX) 220 MG tablet Take 220 mg by mouth 2 (two) times daily with meals. 1 Tablet Oral Twice a day       omega-3 fatty acids 1,000 mg Cap Take by mouth once daily. 1 Capsule Oral Every day       "simvastatin (ZOCOR) 20 MG tablet Take 1 tablet (20 mg total) by mouth once daily. 90 tablet 3    sumatriptan (IMITREX) 100 MG tablet Take 1 tablet (100 mg total) by mouth daily as needed for Migraine. 9 tablet 3    cyanocobalamin 1,000 mcg/mL injection Inject 1 mL (1,000 mcg total) into the muscle once. for 1 dose 1 mL 0    fluconazole (DIFLUCAN) 150 MG Tab Take 1 tablet (150 mg total) by mouth every 7 days. for 1 day 24 tablet 0     No current facility-administered medications for this visit.     Review of patient's allergies indicates:   Allergen Reactions    Demerol  [meperidine]      Agitation  Other reaction(s): Unknown       Review of Systems   Constitutional: Negative for activity change, appetite change, diaphoresis, fatigue, irritability, unexpected weight change, weight gain and weight loss.   HENT: Negative for congestion, hoarse voice, postnasal drip, rhinorrhea, sinus pressure and sinus pain.    Respiratory: Negative for cough, choking, shortness of breath and wheezing.    Cardiovascular: Negative for chest pain and palpitations.   Gastrointestinal: Negative for abdominal distention, abdominal pain, anal bleeding, blood in stool, constipation and diarrhea.   Endocrine: Negative for heat intolerance.   Genitourinary: Negative for dysuria, flank pain, frequency, hematuria, impotence and urgency.   Psychiatric/Behavioral: Positive for depression. Negative for confusion, decreased concentration and suicidal ideas. The patient is not nervous/anxious and does not have insomnia.           Blood pressure 110/72, pulse 68, temperature 97.6 °F (36.4 °C), temperature source Oral, height 6' 2" (1.88 m), weight 92.8 kg (204 lb 9.6 oz), SpO2 96 %. Body mass index is 26.27 kg/m².   Objective:      Physical Exam  Vitals reviewed.   Constitutional:       General: He is not in acute distress.     Appearance: Normal appearance. He is well-developed. He is not ill-appearing or toxic-appearing.   HENT:      Head: " Normocephalic and atraumatic.      Right Ear: External ear normal.      Left Ear: External ear normal.      Nose: Nose normal. No congestion or rhinorrhea.   Eyes:      General:         Right eye: No discharge (b12).         Left eye: No discharge.      Conjunctiva/sclera: Conjunctivae normal.      Pupils: Pupils are equal, round, and reactive to light.   Cardiovascular:      Rate and Rhythm: Normal rate and regular rhythm.      Heart sounds: Normal heart sounds. No murmur heard.  Pulmonary:      Effort: Pulmonary effort is normal.      Breath sounds: Normal breath sounds.   Skin:     General: Skin is warm and dry.      Capillary Refill: Capillary refill takes less than 2 seconds.      Comments: Thick scaly/flaking white toenails   Neurological:      Mental Status: He is alert and oriented to person, place, and time.             Assessment:       1. Hyperglycemia    2. Fatigue, unspecified type    3. Onychomycosis    4. Thyroid disease    5. Familial hypercholesterolemia    6. Essential hypertension         Plan:           Hyperglycemia  -     Comprehensive Metabolic Panel; Future; Expected date: 05/19/2022    Fatigue, unspecified type  -     cyanocobalamin 1,000 mcg/mL injection; Inject 1 mL (1,000 mcg total) into the muscle once. for 1 dose  Dispense: 1 mL; Refill: 0    Onychomycosis  -     fluconazole (DIFLUCAN) 150 MG Tab; Take 1 tablet (150 mg total) by mouth every 7 days. for 1 day  Dispense: 24 tablet; Refill: 0  Will follow patient up in 6 months his current LFTs and renal function normal will have him take medication weekly  Thyroid disease  TSH within normal limits will continue on current dose medications  Familial hypercholesterolemia  Lipids within normal limits  Essential hypertension  Blood pressure well controlled

## 2022-06-26 ENCOUNTER — PATIENT MESSAGE (OUTPATIENT)
Dept: FAMILY MEDICINE | Facility: CLINIC | Age: 82
End: 2022-06-26

## 2022-08-31 DIAGNOSIS — R53.83 FATIGUE, UNSPECIFIED TYPE: ICD-10-CM

## 2022-09-01 RX ORDER — CYANOCOBALAMIN 1000 UG/ML
INJECTION, SOLUTION INTRAMUSCULAR; SUBCUTANEOUS
Qty: 1 ML | Refills: 3 | Status: SHIPPED | OUTPATIENT
Start: 2022-09-01 | End: 2022-09-13 | Stop reason: SDUPTHER

## 2022-09-13 ENCOUNTER — TELEPHONE (OUTPATIENT)
Dept: FAMILY MEDICINE | Facility: CLINIC | Age: 82
End: 2022-09-13

## 2022-09-13 DIAGNOSIS — R53.83 FATIGUE, UNSPECIFIED TYPE: ICD-10-CM

## 2022-09-13 RX ORDER — CYANOCOBALAMIN 1000 UG/ML
1000 INJECTION, SOLUTION INTRAMUSCULAR; SUBCUTANEOUS
Qty: 1 ML | Refills: 3 | Status: SHIPPED | OUTPATIENT
Start: 2022-09-13 | End: 2023-07-19

## 2022-09-13 NOTE — TELEPHONE ENCOUNTER
Pharmacy called and needs clarification on medication because it has refills. The instructions currently are :  Sig: INJECT 1ML INTO THE MUSCLE FOR ONE DOSE   They need the duration due to the refills.   I can either call with verbal or a new prescription can be sent over.

## 2022-11-30 ENCOUNTER — LAB VISIT (OUTPATIENT)
Dept: LAB | Facility: HOSPITAL | Age: 82
End: 2022-11-30
Attending: FAMILY MEDICINE
Payer: MEDICARE

## 2022-11-30 DIAGNOSIS — R73.9 HYPERGLYCEMIA: ICD-10-CM

## 2022-11-30 LAB
ALBUMIN SERPL BCP-MCNC: 4 G/DL (ref 3.5–5.2)
ALP SERPL-CCNC: 43 U/L (ref 55–135)
ALT SERPL W/O P-5'-P-CCNC: 26 U/L (ref 10–44)
ANION GAP SERPL CALC-SCNC: 6 MMOL/L (ref 8–16)
AST SERPL-CCNC: 32 U/L (ref 10–40)
BILIRUB SERPL-MCNC: 0.8 MG/DL (ref 0.1–1)
BUN SERPL-MCNC: 24 MG/DL (ref 8–23)
CALCIUM SERPL-MCNC: 9.1 MG/DL (ref 8.7–10.5)
CHLORIDE SERPL-SCNC: 102 MMOL/L (ref 95–110)
CO2 SERPL-SCNC: 28 MMOL/L (ref 23–29)
CREAT SERPL-MCNC: 1.1 MG/DL (ref 0.5–1.4)
EST. GFR  (NO RACE VARIABLE): >60 ML/MIN/1.73 M^2
GLUCOSE SERPL-MCNC: 96 MG/DL (ref 70–110)
POTASSIUM SERPL-SCNC: 4.1 MMOL/L (ref 3.5–5.1)
PROT SERPL-MCNC: 7.3 G/DL (ref 6–8.4)
SODIUM SERPL-SCNC: 136 MMOL/L (ref 136–145)

## 2022-11-30 PROCEDURE — 36415 COLL VENOUS BLD VENIPUNCTURE: CPT | Performed by: FAMILY MEDICINE

## 2022-11-30 PROCEDURE — 80053 COMPREHEN METABOLIC PANEL: CPT | Performed by: FAMILY MEDICINE

## 2022-12-08 ENCOUNTER — PATIENT MESSAGE (OUTPATIENT)
Dept: FAMILY MEDICINE | Facility: CLINIC | Age: 82
End: 2022-12-08

## 2022-12-09 RX ORDER — FLUCONAZOLE 150 MG/1
150 TABLET ORAL WEEKLY
Qty: 12 TABLET | Refills: 0 | Status: SHIPPED | OUTPATIENT
Start: 2022-12-09 | End: 2022-12-10

## 2023-01-10 ENCOUNTER — PATIENT MESSAGE (OUTPATIENT)
Dept: FAMILY MEDICINE | Facility: CLINIC | Age: 83
End: 2023-01-10

## 2023-01-10 DIAGNOSIS — R51.9 GENERALIZED HEADACHES: ICD-10-CM

## 2023-01-12 RX ORDER — SUMATRIPTAN SUCCINATE 100 MG/1
100 TABLET ORAL ONCE AS NEEDED
Qty: 27 TABLET | Refills: 0 | Status: SHIPPED | OUTPATIENT
Start: 2023-01-12 | End: 2023-11-15

## 2023-01-27 ENCOUNTER — OFFICE VISIT (OUTPATIENT)
Dept: PRIMARY CARE CLINIC | Facility: CLINIC | Age: 83
End: 2023-01-27
Payer: MEDICARE

## 2023-01-27 VITALS
WEIGHT: 207.25 LBS | SYSTOLIC BLOOD PRESSURE: 134 MMHG | HEIGHT: 74 IN | BODY MASS INDEX: 26.6 KG/M2 | OXYGEN SATURATION: 97 % | DIASTOLIC BLOOD PRESSURE: 88 MMHG | HEART RATE: 88 BPM

## 2023-01-27 DIAGNOSIS — E55.9 VITAMIN D DEFICIENCY: ICD-10-CM

## 2023-01-27 DIAGNOSIS — G89.29 CHRONIC RIGHT HIP PAIN: ICD-10-CM

## 2023-01-27 DIAGNOSIS — K59.00 CONSTIPATION, UNSPECIFIED CONSTIPATION TYPE: ICD-10-CM

## 2023-01-27 DIAGNOSIS — R51.9 GENERALIZED HEADACHES: ICD-10-CM

## 2023-01-27 DIAGNOSIS — E21.3 HYPERPARATHYROIDISM: ICD-10-CM

## 2023-01-27 DIAGNOSIS — R40.0 SOMNOLENCE, DAYTIME: ICD-10-CM

## 2023-01-27 DIAGNOSIS — I51.89 DIASTOLIC DYSFUNCTION WITHOUT HEART FAILURE: ICD-10-CM

## 2023-01-27 DIAGNOSIS — Z76.89 ENCOUNTER TO ESTABLISH CARE: Primary | ICD-10-CM

## 2023-01-27 DIAGNOSIS — R03.0 PREHYPERTENSION: ICD-10-CM

## 2023-01-27 DIAGNOSIS — R73.09 ELEVATED GLUCOSE: ICD-10-CM

## 2023-01-27 DIAGNOSIS — E78.5 DYSLIPIDEMIA: ICD-10-CM

## 2023-01-27 DIAGNOSIS — E78.2 MIXED HYPERLIPIDEMIA: ICD-10-CM

## 2023-01-27 DIAGNOSIS — G43.009 MIGRAINE WITHOUT AURA AND RESPONSIVE TO TREATMENT: ICD-10-CM

## 2023-01-27 DIAGNOSIS — R53.83 FATIGUE, UNSPECIFIED TYPE: ICD-10-CM

## 2023-01-27 DIAGNOSIS — M25.551 CHRONIC RIGHT HIP PAIN: ICD-10-CM

## 2023-01-27 DIAGNOSIS — R06.09 DOE (DYSPNEA ON EXERTION): ICD-10-CM

## 2023-01-27 DIAGNOSIS — R79.89 INCREASED PTH LEVEL: ICD-10-CM

## 2023-01-27 DIAGNOSIS — F32.0 MILD MAJOR DEPRESSION: ICD-10-CM

## 2023-01-27 DIAGNOSIS — N40.0 BPH WITHOUT OBSTRUCTION/LOWER URINARY TRACT SYMPTOMS: ICD-10-CM

## 2023-01-27 DIAGNOSIS — E03.8 SUBCLINICAL HYPOTHYROIDISM: ICD-10-CM

## 2023-01-27 PROCEDURE — 99205 PR OFFICE/OUTPT VISIT, NEW, LEVL V, 60-74 MIN: ICD-10-PCS | Mod: S$PBB,,, | Performed by: INTERNAL MEDICINE

## 2023-01-27 PROCEDURE — 99205 OFFICE O/P NEW HI 60 MIN: CPT | Mod: S$PBB,,, | Performed by: INTERNAL MEDICINE

## 2023-01-27 PROCEDURE — 99999 PR PBB SHADOW E&M-EST. PATIENT-LVL III: ICD-10-PCS | Mod: PBBFAC,,, | Performed by: INTERNAL MEDICINE

## 2023-01-27 PROCEDURE — 99999 PR PBB SHADOW E&M-EST. PATIENT-LVL III: CPT | Mod: PBBFAC,,, | Performed by: INTERNAL MEDICINE

## 2023-01-27 PROCEDURE — 99213 OFFICE O/P EST LOW 20 MIN: CPT | Mod: PBBFAC,PN | Performed by: INTERNAL MEDICINE

## 2023-01-27 NOTE — PROGRESS NOTES
[facility] INITIAL VISIT NOTE      CHIEF COMPLAINT     Chief Complaint   Patient presents with    Fatigue     Patient presents to establish with PCP, to discuss fatigue x5 years. Patient sleeps most of the day and is still tired.        HPI     Gilberto Lee is a 82 y.o.  male who presents with a PMHx of  MDD controlled, dCHF, migraine headaches, HLD, who presents to establish care.   Patient is here with his wife. He does have a PCP so wasn't sure if he wanted to change but at the end of the appointment would like to establish care here.   His main complaints and concerns today are: extreme fatigue and sleepiness. Says he has complained of this for years, but has not found a reason and therefore no fix.       Things discussed and ruled out today:   Cbc is normal so no anemia, thyroid function is normal. All other labs are good. Vitals are normal and show normal HR.   Things ordered to evaluate.   Sleep study  Free testosterone  Vitamin d, and vitamin b12   Echo  PFT     Long discussion re causes of fatigue which were mostly normal for him.     His wife says he sleeps all the time. He sleeps all night, but she says during the day he sleeps as well. Confusing fatigue as he has normal muscle strength. Goes to the gym 3 times per week and is able to do resistance exercise but says hes too tired and with PICKERING when he goes fishing. Has daydayime somnolence and wakes up not feeling rested. Has never had a sleep study.     Mood is normal. Has  a long history of depression but very well treated with the wellbutrin. His PHQ 9 is 6/27.     CHRONIC HEALTH ISSUES:   Past Medical History:  Past Medical History:   Diagnosis Date    Arthritis     Atelectasis of left lung 07/2015    Bradycardia 07/17/2015    Calcium blood increased 08/09/2016    Depression     Diastolic dysfunction without heart failure 07/2015    Elevated BUN 08/09/2016    Fatigue 04/29/2015    Hyperglycemia 08/09/2016    Hyperlipidemia      Migraine without aura 10/2015    Prehypertension 11/2016    Testosterone deficiency 04/2015    Thyroid disease          Geriatric Assessment    ADLs : independent   iADLs: independent     Polypharmacy:yes     Visual impairments: normal     Hearing impairment: normal no change    Urinary incontinence: no     Malnutrition: no    Gait/balance/falls: no falls, normal balance     Depression: PHQ2. 0/2    Sleep: sleeps all night and during the day     Cognitive Problems: minicog.5/5.     Environmental/social problems: lives with his wife.     Functional status: functionally intact but with chronic fatigue and sleepiness    Support system: family     Advanced care planning:  does not have on file. But discussed at length today and she will fill out the paperwork and return it to clinic to be scanned in.    Date: 01/27/2023    Power of   I initiated the process of advance care planning today and explained the importance of this process to the patient.  I introduced the concept of advance directives to the patient, as well. Then the patient received detailed information about the importance of designating a Health Care Power of  (HCPOA). He was also instructed to communicate with this person about their wishes for future healthcare, should he become sick and lose decision-making capacity. The patient has not previously appointed a HCPOA. After our discussion, the patient has decided to complete a HCPOA and has appointed his significant other, health care agent:  on file  & health care agent number:  on file  I spent a total time of 10 minutes discussing this issue with the patient.     Worry score 2    Past Surgical History:  Past Surgical History:   Procedure Laterality Date    ACHILLES TENDON SURGERY      APPENDECTOMY      DUPUYTREN CONTRACTURE RELEASE  2014    LUMBAR EPIDURAL INJECTION  2013    PROSTATE SURGERY      SACROILIAC JOINT INJECTION      TONSILLECTOMY      TRANSURETHRAL RESECTION OF PROSTATE          Allergies:  Review of patient's allergies indicates:   Allergen Reactions    Demerol  [meperidine]      Agitation  Other reaction(s): Unknown       Home Medications:  Prior to Admission medications    Medication Sig Start Date End Date Taking? Authorizing Provider   buPROPion (WELLBUTRIN) 75 MG tablet Take 2 tablets (150 mg total) by mouth 3 (three) times daily. 12/30/22   Baltazar Greer MD   CALCIUM CITRATE/VITAMIN D3 (CITRACAL + D ORAL) Take by mouth. Taking 2 tablets by mouth daily    Historical Provider   clotrimazole-betamethasone 1-0.05% (LOTRISONE) cream Apply topically 2 (two) times daily. 1/24/22   Vinnie Kearns DPM   cyanocobalamin 1,000 mcg/mL injection Inject 1 mL (1,000 mcg total) into the muscle every 30 days. INJECT 1ML INTO THE MUSCLE  Strength: 1,000 mcg/mL 9/13/22   Baltazar Greer MD   efinaconazole (JUBLIA) 10 % Jovan Apply 1 Bottle topically once daily. 1/24/22   Vinnie Kearns DPM   fluticasone propionate (FLONASE) 50 mcg/actuation nasal spray 1 spray (50 mcg total) by Each Nostril route once daily. 10/26/22   Baltazar Greer MD   glucosamine-chondroit-vit C-Mn 500-400 mg capsule Take 1 tablet by mouth 3 (three) times daily. 3 Capsule Oral Every day    Historical Provider   levothyroxine (EUTHYROX) 50 MCG tablet Take 1 tablet (50 mcg total) by mouth once daily. 12/30/22   Baltazar Greer MD   loratadine (CLARITIN) 10 mg tablet Take 10 mg by mouth once daily.    Historical Provider   losartan (COZAAR) 25 MG tablet Take 1 tablet (25 mg total) by mouth once daily. 10/26/22   Baltazar Greer MD   naproxen sodium (ANAPROX) 220 MG tablet Take 220 mg by mouth 2 (two) times daily with meals. 1 Tablet Oral Twice a day     Historical Provider   omega-3 fatty acids 1,000 mg Cap Take by mouth once daily. 1 Capsule Oral Every day    Historical Provider   simvastatin (ZOCOR) 20 MG tablet Take 1 tablet (20 mg total) by mouth once daily. 10/26/22   Baltazar Greer MD   sumatriptan (IMITREX)  "100 MG tablet Take 1 tablet (100 mg total) by mouth once as needed for Migraine. 23  Baltazar Greer MD       Family History:  Family History   Problem Relation Age of Onset    COPD Father        Social History:  Social History     Tobacco Use    Smoking status: Former     Types: Cigarettes     Quit date: 1972     Years since quittin.5    Smokeless tobacco: Never   Substance Use Topics    Alcohol use: Yes     Comment: occassional    Drug use: No       Review of Systems:  ROS    Health Maintainence:   TDap is up to date, Influenza is up to date   Pneumovax is up to date.   Zostavax is UTD.       Covid is UTD   Cancer Screening:  Colonoscopy: is up to date.   Screening:  Hepatitis C is up to date in pts born between 0618-6971. **    PHYSICAL EXAM     /88 (BP Location: Left arm, Patient Position: Sitting, BP Method: Large (Manual))   Pulse 88   Ht 6' 2" (1.88 m)   Wt 94 kg (207 lb 3.7 oz)   SpO2 97%   BMI 26.61 kg/m²     GEN - A+OX4, NAD   HEENT - PERRL, EOMI, OP clear. MMM.   Neck - No thyromegaly or cervical LAD. No thyroid masses felt.  CV - RRR, no m/r   Chest - CTAB, no wheezing or rhonchi  Abd - S/NT/ND/+BS.   Ext - 2+BDP and radial pulses. No LE edema.   Neuro - PERRL, EOMI, no nystagmus, eyebrow raise, facial sensation, hearing, m of mastication, smile, palatal raise, shoulder shrug, tongue protrusion symmetric and intact. 5/5 BUE and BLE strength. Sensation to light touch intact throughout. 2+ DTRs. Normal gait.   MSK - No spinal tenderness to palpation. Normal gait.   Skin - No rash.    LABS     Previous labs reviewed.      ASSESSMENT/PLAN     Gilberto Lee is a 82 y.o. male with  1. Encounter to establish care  -medications reviewed and consolidated  -reviewed PMH, medications, HCM including vaccinations.   -reviewed most recent lab work. Reordered as needed. Discussed abnormalities with patient with time allowed for questions.   -reviewed clinic policy with patient " including to arrive 15 mins before appointments, labs and results including expected turn around for results.   -outside records and consultants notes reviewed as time allowed. Updated treatment team with name of other providers.   -reviewed and confirmed patients contact information, preferred pharmacy etc.   -AVS provided after visit to summarized things discussed.   -The following assessments were completed:  Living Situation  CAGE  Depression Screening  Timed Get Up and Go  Cognitive Function Screening-Minicog   Nutrition Screening  ADL Screening      2. Migraine without aura and responsive to treatment  Overview:  > 50 years, on CCB.10/2015: d/c CCB since 7/2015  2/2 fatigue.2/2016:  on sumatriptan PRN.  consider add beta blocker for prophylaxis. see cardiology first.5/2016: on claritin. improved. advise  pt to see ENT if worsening. can not tolerate betablocker.on imitrex and feeling better.   Says headaches have gotten much better with age.       3. Generalized headaches  Overview:  Migraines. Alleviated with imitrex       4. Mild major depression  Overview:  PHQ 9 6/27  On wellbutrin and well treated. Continue  Denies SI/HI       5. BPH without obstruction/lower urinary tract symptoms - s/p turp - last psa in june of 2016 1.0  Overview:  On flomax with good results      6. Dyslipidemia  Overview:  5/2015: on statin ( simvastatin 20 ) before, stopped medicine in 12/2014, since it interfered with CCB.  consider add back simvastatin back with low dosage ( max 10 mg) if needed.6/2015: Total cholesterol 246, HDL 62,  .  10 year ASCVD Risk is 18.1 %.  started pravastatin 20 mg.7/2015. D/c statin by pt, 2/2 fatigue.9/2015:  continue 20 mg simvastatin. 10/2015: tolerate statin, continue 20 mg simvastatin.5/2016: cholestrol 186, , HDL 68 in 1/2016.8/2016: Total 175, HDL 61, LDL 97. continue on statin .  ALt 36 mild high in 11/2016.      7. Prehypertension  Overview:  Normal BP today. Continue to monitor    Limit salt in diet via DASH diet      8. Mixed hyperlipidemia  Overview:  Lipid panel at goal. Continue simvastatin.       9. Diastolic dysfunction without heart failure  Overview:  grade I, per TTE in 7/2015.  referral to cardiology.saw dr. Zamora, who told him not to come back that hes fine. That was 8 years ago and now with his PICKERING, would like to repeat ECHO.       10. Subclinical hypothyroidism  -     T3; Future; Expected date: 01/27/2023  -     T4, FREE; Future; Expected date: 01/27/2023    11. Vitamin D deficiency  -     Misc Sendout Test, Blood vitamin d. he has vitamin d defeciency and has symptoms; Future; Expected date: 01/27/2023    12. Elevated glucose  Improved with diet. Continue to monitor     13. Increased PTH level  S/p parathyroidectomy. Now with nromal calcium and PTH   Continue to monitor     14. Hyperparathyroidism  Overview:   in 5/26/2017.  ca 10.8.   his of calcium high.  ca 10.7 in 8/2016, 11/2016 wnl . refer to endo, s/p parathyroidectomy. Now labs are normal.   Asymptomatic       15. Constipation, unspecified constipation type  Resolved     16. Chronic right hip pain  Improved     17. Fatigue, unspecified type  Overview:  5 /2015 : currently use Co Q10 supplement.  decreased folic acid intake.  fatigue improved.mild low testosterone.6/2015: improved.7/2015: worsen.  8/2015: improved little. x-ray showed mild platelike atelectasis. hx of 20 years smoking , quited in 1974.  RF , TRANG , ESR , CRP TSH wnl  per Rheumatologist PN.9/2015: improved after d/c CCB.    Orders:  -     Testosterone, free; Future; Expected date: 01/27/2023  -     TESTOSTERONE; Future; Expected date: 01/27/2023  -     T3; Future; Expected date: 01/27/2023  -     T4, FREE; Future; Expected date: 01/27/2023    18. Somnolence, daytime  Sleep studye     19. PICKERING (dyspnea on exertion)  -     Echo; Future  -     Complete PFT w/ bronchodilator; Future         ACP: discussion had about ACP to include definition of ACP,  HCP, CODE STATUS. Paperwork handed to patient, to review, discuss with family and return it to clinic to be scanned into the chart.   Advance Care Planning       My total time spent on this encounter was at least 60 minutes which included  the following activities: preparing to see the patient, performing a medically appropriate and/or evaluation, counseling and educating the patient and family/caregiver, ordering medications, tests, or procedures, referring and communicating with other healthcare providers, documenting clinical information in the electronic or other health record. This time is independent and non-overlapping.         RTC in 1 months, sooner if needed and depending on labs.to follow up on tests and labs     Mercedes Clemens MD  Board Certified Internist/Geriatrician  [facility] [place] [address] ([place])

## 2023-01-31 DIAGNOSIS — R40.0 DAYTIME SOMNOLENCE: Primary | ICD-10-CM

## 2023-02-01 ENCOUNTER — HOSPITAL ENCOUNTER (OUTPATIENT)
Dept: PULMONOLOGY | Facility: HOSPITAL | Age: 83
Discharge: HOME | End: 2023-02-01
Attending: INTERNAL MEDICINE
Payer: MEDICARE

## 2023-02-01 DIAGNOSIS — R06.09 DOE (DYSPNEA ON EXERTION): ICD-10-CM

## 2023-02-01 LAB
BRPFT: NORMAL
DLCO ADJ PRE: 23.5 ML/(MIN*MMHG)
DLCO SINGLE BREATH LLN: 21.67
DLCO SINGLE BREATH PRE REF: 82.2 %
DLCO SINGLE BREATH REF: 28.6
DLCOC SBVA LLN: 2.58
DLCOC SBVA PRE REF: 90.5 %
DLCOC SBVA REF: 3.6
DLCOC SINGLE BREATH LLN: 21.67
DLCOC SINGLE BREATH PRE REF: 82.2 %
DLCOC SINGLE BREATH REF: 28.6
DLCOVA LLN: 2.58
DLCOVA PRE REF: 90.5 %
DLCOVA PRE: 3.26 ML/(MIN*MMHG*L)
DLCOVA REF: 3.6
DLVAADJ PRE: 3.26 ML/(MIN*MMHG*L)
ERVN2 LLN: -16448.96
ERVN2 PRE REF: 37.5 %
ERVN2 PRE: 0.39 L
ERVN2 REF: 1.04
FEF 25 75 CHG: 16.1 %
FEF 25 75 LLN: 0.84
FEF 25 75 POST REF: 96.4 %
FEF 25 75 PRE REF: 83 %
FEF 25 75 REF: 2.22
FET100 CHG: -5.2 %
FEV1 CHG: 6.7 %
FEV1 FVC CHG: 3.8 %
FEV1 FVC LLN: 59
FEV1 FVC POST REF: 96.2 %
FEV1 FVC PRE REF: 92.7 %
FEV1 FVC REF: 74
FEV1 LLN: 2.25
FEV1 POST REF: 99.6 %
FEV1 PRE REF: 93.3 %
FEV1 REF: 3.25
FRCN2 LLN: 3.04
FRCN2 PRE REF: 81.8 %
FRCN2 REF: 4.03
FVC CHG: 2.8 %
FVC LLN: 3.2
FVC POST REF: 102.3 %
FVC PRE REF: 99.6 %
FVC REF: 4.44
IVC PRE: 4.17 L
IVC SINGLE BREATH LLN: 3.2
IVC SINGLE BREATH PRE REF: 93.9 %
IVC SINGLE BREATH REF: 4.44
MVV LLN: 111
MVV PRE REF: 87.8 %
MVV REF: 131
PEF CHG: 4.9 %
PEF LLN: 5.58
PEF POST REF: 116.1 %
PEF PRE REF: 110.7 %
PEF REF: 8.17
POST FEF 25 75: 2.14 L/S
POST FET 100: 11.78 SEC
POST FEV1 FVC: 71.16 %
POST FEV1: 3.23 L
POST FVC: 4.54 L
POST PEF: 9.48 L/S
PRE DLCO: 23.5 ML/(MIN*MMHG)
PRE FEF 25 75: 1.84 L/S
PRE FET 100: 12.42 SEC
PRE FEV1 FVC: 68.53 %
PRE FEV1: 3.03 L
PRE FRC N2: 3.3 L
PRE FVC: 4.42 L
PRE MVV: 115 L/MIN
PRE PEF: 9.04 L/S
RVN2 LLN: 2.32
RVN2 PRE REF: 93 %
RVN2 PRE: 2.78 L
RVN2 REF: 2.99
RVN2TLCN2 LLN: 36.18
RVN2TLCN2 PRE REF: 82 %
RVN2TLCN2 PRE: 37.04 %
RVN2TLCN2 REF: 45.16
TLCN2 LLN: 6.79
TLCN2 PRE REF: 94.6 %
TLCN2 PRE: 7.51 L
TLCN2 REF: 7.94
VA PRE: 7.21 L
VA SINGLE BREATH LLN: 7.79
VA SINGLE BREATH PRE REF: 92.6 %
VA SINGLE BREATH REF: 7.79
VCMAXN2 LLN: 3.2
VCMAXN2 PRE REF: 106.5 %
VCMAXN2 PRE: 4.73 L
VCMAXN2 REF: 4.44

## 2023-02-01 PROCEDURE — 94060 EVALUATION OF WHEEZING: CPT

## 2023-02-01 PROCEDURE — 94727 PR PULM FUNCTION TEST BY GAS: ICD-10-PCS | Mod: 26,,, | Performed by: INTERNAL MEDICINE

## 2023-02-01 PROCEDURE — 94060 PR EVAL OF BRONCHOSPASM: ICD-10-PCS | Mod: 26,,, | Performed by: INTERNAL MEDICINE

## 2023-02-01 PROCEDURE — 94729 DIFFUSING CAPACITY: CPT

## 2023-02-01 PROCEDURE — 94727 GAS DIL/WSHOT DETER LNG VOL: CPT

## 2023-02-01 PROCEDURE — 94729 DIFFUSING CAPACITY: CPT | Mod: 26,,, | Performed by: INTERNAL MEDICINE

## 2023-02-01 PROCEDURE — 94060 EVALUATION OF WHEEZING: CPT | Mod: 26,,, | Performed by: INTERNAL MEDICINE

## 2023-02-01 PROCEDURE — 94727 GAS DIL/WSHOT DETER LNG VOL: CPT | Mod: 26,,, | Performed by: INTERNAL MEDICINE

## 2023-02-01 PROCEDURE — 94729 PR C02/MEMBANE DIFFUSE CAPACITY: ICD-10-PCS | Mod: 26,,, | Performed by: INTERNAL MEDICINE

## 2023-02-01 RX ORDER — ALBUTEROL SULFATE 2.5 MG/.5ML
2.5 SOLUTION RESPIRATORY (INHALATION)
Status: COMPLETED | OUTPATIENT
Start: 2023-02-01 | End: 2023-02-01

## 2023-02-01 RX ADMIN — ALBUTEROL SULFATE 2.5 MG: 2.5 SOLUTION RESPIRATORY (INHALATION) at 08:02

## 2023-02-06 ENCOUNTER — HOSPITAL ENCOUNTER (OUTPATIENT)
Dept: CARDIOLOGY | Facility: HOSPITAL | Age: 83
Discharge: HOME | End: 2023-02-06
Attending: INTERNAL MEDICINE
Payer: MEDICARE

## 2023-02-06 VITALS — WEIGHT: 207 LBS | BODY MASS INDEX: 26.56 KG/M2 | HEIGHT: 74 IN

## 2023-02-06 DIAGNOSIS — R06.09 DOE (DYSPNEA ON EXERTION): ICD-10-CM

## 2023-02-06 PROCEDURE — 93306 TTE W/DOPPLER COMPLETE: CPT

## 2023-02-06 PROCEDURE — 93306 ECHO (CUPID ONLY): ICD-10-PCS | Mod: 26,,, | Performed by: INTERNAL MEDICINE

## 2023-02-06 PROCEDURE — 93306 TTE W/DOPPLER COMPLETE: CPT | Mod: 26,,, | Performed by: INTERNAL MEDICINE

## 2023-02-07 LAB
AORTIC ROOT ANNULUS: 3.6 CM
AORTIC VALVE CUSP SEPERATION: 2 CM
AV INDEX (PROSTH): 0.75
AV MEAN GRADIENT: 4 MMHG
AV PEAK GRADIENT: 7 MMHG
AV VALVE AREA: 3.13 CM2
AV VELOCITY RATIO: 0.8
BSA FOR ECHO PROCEDURE: 2.21 M2
CV ECHO LV RWT: 0.51 CM
DOP CALC AO PEAK VEL: 1.33 M/S
DOP CALC AO VTI: 30.5 CM
DOP CALC LVOT AREA: 4.2 CM2
DOP CALC LVOT DIAMETER: 2.3 CM
DOP CALC LVOT PEAK VEL: 1.07 M/S
DOP CALC LVOT STROKE VOLUME: 95.51 CM3
DOP CALCLVOT PEAK VEL VTI: 23 CM
E WAVE DECELERATION TIME: 248 MSEC
E/A RATIO: 0.84
E/E' RATIO: 5.6 M/S
ECHO LV POSTERIOR WALL: 1.23 CM (ref 0.6–1.1)
EJECTION FRACTION: 70 %
FRACTIONAL SHORTENING: 34 % (ref 28–44)
INTERVENTRICULAR SEPTUM: 1.06 CM (ref 0.6–1.1)
IVRT: 121 MSEC
LEFT ATRIUM SIZE: 4.6 CM
LEFT INTERNAL DIMENSION IN SYSTOLE: 3.17 CM (ref 2.1–4)
LEFT VENTRICLE DIASTOLIC VOLUME INDEX: 48.42 ML/M2
LEFT VENTRICLE DIASTOLIC VOLUME: 107 ML
LEFT VENTRICLE MASS INDEX: 93 G/M2
LEFT VENTRICLE SYSTOLIC VOLUME INDEX: 18.1 ML/M2
LEFT VENTRICLE SYSTOLIC VOLUME: 40 ML
LEFT VENTRICULAR INTERNAL DIMENSION IN DIASTOLE: 4.79 CM (ref 3.5–6)
LEFT VENTRICULAR MASS: 204.43 G
LV LATERAL E/E' RATIO: 4.67 M/S
LV SEPTAL E/E' RATIO: 7 M/S
LVOT MG: 2 MMHG
LVOT MV: 0.67 CM/S
MV PEAK A VEL: 0.67 M/S
MV PEAK E VEL: 0.56 M/S
MV STENOSIS PRESSURE HALF TIME: 64 MS
MV VALVE AREA P 1/2 METHOD: 3.44 CM2
RIGHT VENTRICULAR END-DIASTOLIC DIMENSION: 3.27 CM
TDI LATERAL: 0.12 M/S
TDI SEPTAL: 0.08 M/S
TDI: 0.1 M/S

## 2023-02-09 ENCOUNTER — TELEPHONE (OUTPATIENT)
Dept: PRIMARY CARE CLINIC | Facility: CLINIC | Age: 83
End: 2023-02-09
Payer: MEDICARE

## 2023-02-09 NOTE — TELEPHONE ENCOUNTER
Spoke with pt, discussed Dr. Ansari's message, pt stated he had the sleep study scheduled, but the order is still in epic. He will call about that.     Next appt with Dr. Clemens is in March. He will call for any problems.

## 2023-02-09 NOTE — TELEPHONE ENCOUNTER
Please call and notify pt:  Dr. Clemens is out and I'm covering her inbox.   Pulmonary function test does show some mild obstruction. This may be related to his history of smoking.   Ultrasound of the heart shows normal function. There is moderate enlargement of one of the chambers of the heart but that does not increase shortness of breath.   Proceed with sleep study.     F/u w/ Dr. Clemens as scheduled.

## 2023-02-28 ENCOUNTER — LAB VISIT (OUTPATIENT)
Dept: LAB | Facility: HOSPITAL | Age: 83
End: 2023-02-28
Attending: INTERNAL MEDICINE
Payer: MEDICARE

## 2023-02-28 DIAGNOSIS — E03.8 SUBCLINICAL HYPOTHYROIDISM: ICD-10-CM

## 2023-02-28 DIAGNOSIS — R53.83 FATIGUE, UNSPECIFIED TYPE: ICD-10-CM

## 2023-02-28 DIAGNOSIS — E55.9 VITAMIN D DEFICIENCY: ICD-10-CM

## 2023-02-28 LAB
25(OH)D3+25(OH)D2 SERPL-MCNC: 53 NG/ML (ref 30–96)
T4 FREE SERPL-MCNC: 0.9 NG/DL (ref 0.71–1.51)

## 2023-02-28 PROCEDURE — 84439 ASSAY OF FREE THYROXINE: CPT | Performed by: INTERNAL MEDICINE

## 2023-02-28 PROCEDURE — 36415 COLL VENOUS BLD VENIPUNCTURE: CPT | Performed by: INTERNAL MEDICINE

## 2023-02-28 PROCEDURE — 84480 ASSAY TRIIODOTHYRONINE (T3): CPT | Performed by: INTERNAL MEDICINE

## 2023-02-28 PROCEDURE — 84403 ASSAY OF TOTAL TESTOSTERONE: CPT | Performed by: INTERNAL MEDICINE

## 2023-02-28 PROCEDURE — 82306 VITAMIN D 25 HYDROXY: CPT | Performed by: INTERNAL MEDICINE

## 2023-02-28 PROCEDURE — 84402 ASSAY OF FREE TESTOSTERONE: CPT | Performed by: INTERNAL MEDICINE

## 2023-03-01 LAB
T3 SERPL-MCNC: 86 NG/DL (ref 71–180)
TESTOST SERPL-MCNC: 321 NG/DL (ref 264–916)

## 2023-03-02 ENCOUNTER — TELEPHONE (OUTPATIENT)
Dept: PRIMARY CARE CLINIC | Facility: CLINIC | Age: 83
End: 2023-03-02
Payer: MEDICARE

## 2023-03-02 NOTE — PROGRESS NOTES
Testosterone level is within normal range, vitamin d is normal.   Thyroid function in the normal range

## 2023-03-02 NOTE — TELEPHONE ENCOUNTER
3-2-23 spoke with patient 11:05am, discussed lab results. SUSAN   Called and spoke to pt, relayed MD message.  She denies s/sx of infection. Denies pulmonary sx. Denies RA sx. She cannot schedule a sooner appt as she is in [place]. She will call if anything changes.  Patient verbalized understanding and had no further questions/concerns.

## 2023-03-06 ENCOUNTER — TELEPHONE (OUTPATIENT)
Dept: PRIMARY CARE CLINIC | Facility: CLINIC | Age: 83
End: 2023-03-06
Payer: MEDICARE

## 2023-03-06 LAB — TESTOST FREE SERPL-MCNC: 3 PG/ML (ref 6.6–18.1)

## 2023-03-06 NOTE — TELEPHONE ENCOUNTER
Pt called back for lab results. Stated that he has an upcoming appointment with urologist. He will discuss further at that appointment. SUSAN

## 2023-03-06 NOTE — PROGRESS NOTES
Free testosterone level is indeed a bit low. If he sees urologist, discuss, if not I may refer him if hes interested in getting this treated which may help with energy

## 2023-03-06 NOTE — TELEPHONE ENCOUNTER
----- Message from Mercedes Clemens MD sent at 3/6/2023  7:49 AM CST -----  Free testosterone level is indeed a bit low. If he sees urologist, discuss, if not I may refer him if hes interested in getting this treated which may help with energy

## 2023-05-12 ENCOUNTER — TELEPHONE (OUTPATIENT)
Dept: UROLOGY | Facility: CLINIC | Age: 83
End: 2023-05-12
Payer: MEDICARE

## 2023-05-12 ENCOUNTER — OFFICE VISIT (OUTPATIENT)
Dept: PRIMARY CARE CLINIC | Facility: CLINIC | Age: 83
End: 2023-05-12
Payer: MEDICARE

## 2023-05-12 VITALS
WEIGHT: 209.56 LBS | HEIGHT: 73 IN | TEMPERATURE: 98 F | SYSTOLIC BLOOD PRESSURE: 130 MMHG | RESPIRATION RATE: 18 BRPM | HEART RATE: 64 BPM | BODY MASS INDEX: 27.77 KG/M2 | DIASTOLIC BLOOD PRESSURE: 74 MMHG | OXYGEN SATURATION: 97 %

## 2023-05-12 DIAGNOSIS — R51.9 GENERALIZED HEADACHES: ICD-10-CM

## 2023-05-12 DIAGNOSIS — R53.83 FATIGUE, UNSPECIFIED TYPE: Primary | ICD-10-CM

## 2023-05-12 DIAGNOSIS — E07.9 THYROID DISEASE: ICD-10-CM

## 2023-05-12 DIAGNOSIS — R79.89 LOW TESTOSTERONE: ICD-10-CM

## 2023-05-12 PROCEDURE — 99999 PR PBB SHADOW E&M-EST. PATIENT-LVL V: ICD-10-PCS | Mod: PBBFAC,,, | Performed by: INTERNAL MEDICINE

## 2023-05-12 PROCEDURE — 99214 OFFICE O/P EST MOD 30 MIN: CPT | Mod: S$PBB,,, | Performed by: INTERNAL MEDICINE

## 2023-05-12 PROCEDURE — 99214 PR OFFICE/OUTPT VISIT, EST, LEVL IV, 30-39 MIN: ICD-10-PCS | Mod: S$PBB,,, | Performed by: INTERNAL MEDICINE

## 2023-05-12 PROCEDURE — 99215 OFFICE O/P EST HI 40 MIN: CPT | Mod: PBBFAC,PN | Performed by: INTERNAL MEDICINE

## 2023-05-12 PROCEDURE — 99999 PR PBB SHADOW E&M-EST. PATIENT-LVL V: CPT | Mod: PBBFAC,,, | Performed by: INTERNAL MEDICINE

## 2023-05-12 RX ORDER — LEVOTHYROXINE SODIUM 50 UG/1
50 TABLET ORAL DAILY
Qty: 90 TABLET | Refills: 1 | Status: SHIPPED | OUTPATIENT
Start: 2023-05-12 | End: 2023-07-19

## 2023-05-12 RX ORDER — BUPROPION HYDROCHLORIDE 75 MG/1
75 TABLET ORAL 3 TIMES DAILY
Qty: 540 TABLET | Refills: 1 | Status: SHIPPED | OUTPATIENT
Start: 2023-05-12 | End: 2023-07-19

## 2023-05-12 RX ORDER — SUMATRIPTAN SUCCINATE 100 MG/1
100 TABLET ORAL ONCE AS NEEDED
Qty: 27 TABLET | Refills: 1 | Status: CANCELLED | OUTPATIENT
Start: 2023-05-12 | End: 2023-05-12

## 2023-05-12 NOTE — TELEPHONE ENCOUNTER
----- Message from Aaron Brennan sent at 5/12/2023  4:08 PM CDT -----  Contact: Fausto  Type: Needs Medical Advice  Who Called:  Fausto/Dr.Miller Montez Call Back Number: [phone] opt 3    Additional Information: States she need to speak with office regarding pt appt on 5/16 want to know if Testerone shots are offered.Please call back and can also message on teams

## 2023-05-12 NOTE — PROGRESS NOTES
INTERNAL MEDICINE PROGRESS/URGENT CARE NOTE    CHIEF COMPLAINT     Chief Complaint   Patient presents with    Follow-up       HPI     Gilberto Lee is a 82 y.o.  male who presents for an urgent/follow up visit today.  Patient was recently seen to establish care and his main major complaint of which he was very distraught about was his fatigue. Sounded new but review of his file shows this complaint has been addressed in great detail back as far as 2015.   We sent him for a few evaluations and he is back today to review it all. We subsequently found.     From our first and last meeting a month ago, we recorded:   Things discussed and ruled out today:   Cbc is normal so no anemia, thyroid function is normal. All other labs are good. Vitals are normal and show normal HR.   Things ordered to evaluate.   Sleep study  Free testosterone  Vitamin d, and vitamin b12   Echo  PFT      Long discussion re causes of fatigue which were mostly normal for him.      His wife says he sleeps all the time. He sleeps all night, but she says during the day he sleeps as well. Confusing fatigue as he has normal muscle strength. Goes to the gym 3 times per week and is able to do resistance exercise but says hes too tired and with PICKERING when he goes fishing. Has daydayime somnolence and wakes up not feeling rested. Has never had a sleep study.      Mood is normal. Has  a long history of depression but very well treated with the wellbutrin. His PHQ 9 is 6/27.       Results of the above test are:   ECHO shows normal EF of 70% with normal diastolic function. Otherwise normal function.   Full thyroid eval is normal.   Low free testosterone. Of 3.0. normal levels is 6.6-18 . Previous testosterone tests were normal, but given his age, free was checked.   CMP and other labs came back normal.   He had his sleep test done, which showed mild ANGELIQUE. Suggest machine if patient would like       Today, the discussion is lab results and that the only notable  explanation for how he feels is to consider testosterone injection.     Past Medical History:  Past Medical History:   Diagnosis Date    Arthritis     Atelectasis of left lung 07/2015    Bradycardia 07/17/2015    Calcium blood increased 08/09/2016    Depression     Diastolic dysfunction without heart failure 07/2015    Elevated BUN 08/09/2016    Fatigue 04/29/2015    Hyperglycemia 08/09/2016    Hyperlipidemia     Migraine without aura 10/2015    Prehypertension 11/2016    Testosterone deficiency 04/2015    Thyroid disease        Home Medications:  Prior to Admission medications    Medication Sig Start Date End Date Taking? Authorizing Provider   buPROPion (WELLBUTRIN) 75 MG tablet Take 2 tablets (150 mg total) by mouth 3 (three) times daily. 12/30/22   Baltazar Greer MD   CALCIUM CITRATE/VITAMIN D3 (CITRACAL + D ORAL) Take by mouth. Taking 2 tablets by mouth daily    Historical Provider   cyanocobalamin 1,000 mcg/mL injection Inject 1 mL (1,000 mcg total) into the muscle every 30 days. INJECT 1ML INTO THE MUSCLE  Strength: 1,000 mcg/mL 9/13/22   Baltazar Greer MD   fluticasone propionate (FLONASE) 50 mcg/actuation nasal spray 1 spray (50 mcg total) by Each Nostril route once daily. 10/26/22   Baltazar Greer MD   glucosamine-chondroit-vit C-Mn 500-400 mg capsule Take 1 tablet by mouth 3 (three) times daily. 3 Capsule Oral Every day    Historical Provider   levothyroxine (EUTHYROX) 50 MCG tablet Take 1 tablet (50 mcg total) by mouth once daily. 12/30/22   Baltazar Greer MD   loratadine (CLARITIN) 10 mg tablet Take 10 mg by mouth once daily.    Historical Provider   losartan (COZAAR) 25 MG tablet Take 1 tablet (25 mg total) by mouth once daily. 10/26/22   Baltazar Greer MD   naproxen sodium (ANAPROX) 220 MG tablet Take 220 mg by mouth 2 (two) times daily with meals. 1 Tablet Oral Twice a day     Historical Provider   omega-3 fatty acids 1,000 mg Cap Take by mouth once daily. 1 Capsule Oral Every day    Historical  "Provider   simvastatin (ZOCOR) 20 MG tablet Take 1 tablet (20 mg total) by mouth once daily. 10/26/22   Baltazar Greer MD   sumatriptan (IMITREX) 100 MG tablet Take 1 tablet (100 mg total) by mouth once as needed for Migraine. 1/12/23 1/12/23  Baltazar Greer MD       Review of Systems:  Review of Systems        PHYSICAL EXAM     /74 (BP Location: Left arm, Patient Position: Sitting, BP Method: Medium (Manual))   Pulse 64   Temp 98.1 °F (36.7 °C) (Oral)   Resp 18   Ht 6' 1" (1.854 m)   Wt 95.1 kg (209 lb 8.8 oz)   SpO2 97%   BMI 27.65 kg/m²     GEN - A+OX4, NAD   HEENT - PERRL, EOMI, OP clear  Neck - No thyromegaly or cervical LAD. No thyroid masses felt.  CV - RRR, no m/r   Chest - CTAB, no wheezing or rhonchi  Abd - S/NT/ND/+BS.   Ext - 2+BDP and radial pulses. No C/C/E.  Skin - No rash.    LABS       ASSESSMENT/PLAN     Gilberto Lee is a 82 y.o. male with  1. Fatigue, unspecified type  Reviewed all labs, ECHO etc normal.   Still complaints of severe sleepiness that interferes with his life. Wife states must sleep at least 16 hours then more during the day.   Testosterone is low. And found to have m ild ANGELIQUE. Will try to treat both and see how he feels.   Overview:  5 /2015 : currently use Co Q10 supplement.  decreased folic acid intake.  fatigue improved.mild low testosterone.6/2015: improved.7/2015: worsen.  8/2015: improved little. x-ray showed mild platelike atelectasis. hx of 20 years smoking , quited in 1974.  RF , TRANG , ESR , CRP TSH wnl  per Rheumatologist PN.9/2015: improved after d/c CCB.      2. Thyroid disease  -     levothyroxine (EUTHYROX) 50 MCG tablet; Take 1 tablet (50 mcg total) by mouth once daily.  Dispense: 90 tablet; Refill: 1    3. Generalized headaches  Overview:  Migraines. Alleviated with imitrex       4. Low testosterone  -     Ambulatory referral/consult to Urology; Future; Expected date: 05/19/2023    Other orders  -     buPROPion (WELLBUTRIN) 75 MG tablet; Take 1 " tablet (75 mg total) by mouth 3 (three) times daily.  Dispense: 540 tablet; Refill: 1           WORRY SCORE 3,chronic fatigue    RTC in 6 months, sooner if needed and depending on labs.    Mrecedes Clemens MD  Board Certified Internist/Geriatrician  [facility] ([place])

## 2023-05-16 ENCOUNTER — TELEPHONE (OUTPATIENT)
Dept: UROLOGY | Facility: CLINIC | Age: 83
End: 2023-05-16
Payer: MEDICARE

## 2023-05-16 ENCOUNTER — OFFICE VISIT (OUTPATIENT)
Dept: UROLOGY | Facility: CLINIC | Age: 83
End: 2023-05-16
Payer: MEDICARE

## 2023-05-16 VITALS
BODY MASS INDEX: 27.79 KG/M2 | DIASTOLIC BLOOD PRESSURE: 81 MMHG | WEIGHT: 209.69 LBS | SYSTOLIC BLOOD PRESSURE: 122 MMHG | HEIGHT: 73 IN | HEART RATE: 62 BPM

## 2023-05-16 DIAGNOSIS — R79.89 LOW TESTOSTERONE: ICD-10-CM

## 2023-05-16 DIAGNOSIS — R53.83 FATIGUE, UNSPECIFIED TYPE: Primary | ICD-10-CM

## 2023-05-16 PROCEDURE — 99204 OFFICE O/P NEW MOD 45 MIN: CPT | Mod: S$PBB,,, | Performed by: NURSE PRACTITIONER

## 2023-05-16 PROCEDURE — 99999 PR PBB SHADOW E&M-EST. PATIENT-LVL IV: ICD-10-PCS | Mod: PBBFAC,,, | Performed by: NURSE PRACTITIONER

## 2023-05-16 PROCEDURE — 99204 PR OFFICE/OUTPT VISIT, NEW, LEVL IV, 45-59 MIN: ICD-10-PCS | Mod: S$PBB,,, | Performed by: NURSE PRACTITIONER

## 2023-05-16 PROCEDURE — 99999 PR PBB SHADOW E&M-EST. PATIENT-LVL IV: CPT | Mod: PBBFAC,,, | Performed by: NURSE PRACTITIONER

## 2023-05-16 PROCEDURE — 99214 OFFICE O/P EST MOD 30 MIN: CPT | Mod: PBBFAC,PO | Performed by: NURSE PRACTITIONER

## 2023-05-16 NOTE — PROGRESS NOTES
"[facility] Note - [place]  Provider:  ELLIOT Atkinson  Date of Service: 05/16/2023      Subjective:        HPI: Gilberto Lee is a 82 y.o. male--NEW PT "LOW TESTOSTERONE EVAL"    Initial consult/evaluation by me in clinic for low T on 5/16/23:  The patient has been evaluated in the past.  The patient has not been any on exogenous testosterone in the past.  The patient does have good libido   The patient does not have erectile dysfunction.   The patient does have depression  The patient does have fatigue  The patient does not have concentration issues  The patient does not know have difficulty gaining or maintaining muscle mass    The patient gets 6 hrs-7hrs sleep a night, but sleeps throughout the day.  Just found out he has dx of sleep apnea.  The patient does workout regularly  The patient does not have a family history of prostate cancer  The patient does have obstructive sleep apnea    Planning on having any more children: No    The patient does not have any other urologic issues.       Lab Results   Component Value Date    TESTOSTERONE 3.0 (L) 02/28/2023    TESTOSTERONE 321 02/28/2023     PSA, Screen (ng/ml)   Date Value   08/12/2013 1.75     Lab Results   Component Value Date    WBC 7.03 05/17/2022    HGB 14.9 05/17/2022    HCT 43.9 05/17/2022    MCV 93 05/17/2022     05/17/2022       Lab Results   Component Value Date    HGBA1C 5.6 05/26/2017       PSA History: NO FAMILY HX of PROSTATE CA  PSA, Screen (ng/ml)   Date Value   08/12/2013 1.75     REVIEW OF SYSTEMS:  General ROS: no fevers, no chills  Psychological ROS: no depression  Endocrine ROS: no diabetes  Respiratory ROS: no SOB  Cardiovascular ROS: no CP  Gastrointestinal ROS: no abdominal pain, no constipation, no diarrhea  Musculoskeletal ROS: no muscle pain  Neurological ROS: no headaches  Dermatological ROS: no rashes  HEENT: +glasses, no sinus   ROS: per HPI    Past Medical History:   Diagnosis Date    " Arthritis     Atelectasis of left lung 07/2015    Bradycardia 07/17/2015    Calcium blood increased 08/09/2016    Depression     Diastolic dysfunction without heart failure 07/2015    Elevated BUN 08/09/2016    Fatigue 04/29/2015    Hyperglycemia 08/09/2016    Hyperlipidemia     Migraine without aura 10/2015    Prehypertension 11/2016    Testosterone deficiency 04/2015    Thyroid disease        Objective:     Vitals:    05/16/23 1359   BP: 122/81   Pulse: 62       Focused  exam  Pt Deferred    Recent Labs   Lab 12/11/21  1022 05/17/22  0651   WBC 7.04 7.03   Hemoglobin 13.9 L 14.9   Hematocrit 42.1 43.9   Platelets 243 229   ]  Recent Labs   Lab 11/11/21  0712 05/17/22  0651 11/30/22  0710   Sodium 137 138 136   Potassium 4.2 4.2 4.1   Chloride 104 100 102   CO2 27 26 28   BUN 25 H 26 H 24 H   Creatinine 1.1 1.0 1.1   Glucose 107 112 H 96   Calcium 8.7 9.2 9.1   Alkaline Phosphatase 51 L 45 L 43 L   Total Protein 7.2 7.2 7.3   Albumin 3.8 4.0 4.0   Total Bilirubin 0.8 0.6 0.8   AST 31 28 32   ALT 24 25 26   ]    Lab Results   Component Value Date    HGBA1C 5.6 05/26/2017         Assessment:     Gilberto Lee is a 82 y.o. male here for evaluation for low T. The patient's main complaints that may suggest low T are extreme fatigue, decreased concentration issues.     Following labs to be completed fasting and prior to 9 am for recheck:  Prolactin Level  FSH, LH  Testosterone Panel  CBC  Total Estrogens    We went over the AUA guidelines which include demonstrating Testosterone <300 2x in a row before 9am. Explaining that if low T were found and patient was treated, treatment would be guided based on symptoms and not necessarily numbers (unless the numbers were too high). We briefly discussed types of treatment options (injections, creams, testopel). We also went over that a cbc, psa and Testosterone level would be checked on regular 6 months intervals and rectal exams would be checked yearly to monitor for prostate  cancer. We went over why CBC and PSA are important when following testosterone.     1. Fatigue, unspecified type    2. Low testosterone          Plan:   We reviewed that we do not treat with testosterone if pt has the followin. Elevated psa  2. Still trying to get pregnant  3. Significant heart conditions - needs clearance from cardiologist first    We treat for symptoms, not numbers. Do not have to increase testostosterone just bc numbers still low if symptoms improve (libido, fatigue, ED)    We will start with Checking a morning Testosterone level (before 9am)  If <300 then recheck a testosterone, luteinizing hormone (if low or low normal then check prolactin),  estradiol (to establish baseline), prolactin, FSH (only in men with infertility), hemoglobin and hematocrit (refer to pcp/endocrine if Hct >50 and T<300) and PSA (before 9am- in any patient over age 40)  If <300 again then he will return to discuss treatment options in detail and expectations.     F/UP-TBD, we will contact pt after we receive pending future lab results.  Pt verbalized understanding at this time.    ELLIOT FOURNIER

## 2023-05-16 NOTE — TELEPHONE ENCOUNTER
----- Message from Timmy Arguelles sent at 5/16/2023  9:12 AM CDT -----  Contact: self  Type: Needs Medical Advice  Who Called:  pt  Symptoms (please be specific):  low testosterone      Best Call Back Number: [phone]    Additional Information: Pt states he would like to know if provider treats low testosterone pt  Pt has appt for 5/16 at 2pm  Thank you

## 2023-05-17 ENCOUNTER — LAB VISIT (OUTPATIENT)
Dept: LAB | Facility: HOSPITAL | Age: 83
End: 2023-05-17
Attending: NURSE PRACTITIONER
Payer: MEDICARE

## 2023-05-17 DIAGNOSIS — R53.83 FATIGUE, UNSPECIFIED TYPE: ICD-10-CM

## 2023-05-17 DIAGNOSIS — R79.89 LOW TESTOSTERONE: ICD-10-CM

## 2023-05-17 LAB
ERYTHROCYTE [DISTWIDTH] IN BLOOD BY AUTOMATED COUNT: 12.6 % (ref 11.5–14.5)
FSH SERPL-ACNC: 5.88 MIU/ML (ref 0.95–11.95)
HCT VFR BLD AUTO: 46.5 % (ref 40–54)
HGB BLD-MCNC: 15.6 G/DL (ref 14–18)
LH SERPL-ACNC: 2.2 MIU/ML (ref 0.6–12.1)
MCH RBC QN AUTO: 31.1 PG (ref 27–31)
MCHC RBC AUTO-ENTMCNC: 33.5 G/DL (ref 32–36)
MCV RBC AUTO: 93 FL (ref 82–98)
PLATELET # BLD AUTO: 169 K/UL (ref 150–450)
PMV BLD AUTO: 9.7 FL (ref 9.2–12.9)
PROLACTIN SERPL IA-MCNC: 10 NG/ML (ref 3.5–19.4)
RBC # BLD AUTO: 5.01 M/UL (ref 4.6–6.2)
WBC # BLD AUTO: 6.26 K/UL (ref 3.9–12.7)

## 2023-05-17 PROCEDURE — 84270 ASSAY OF SEX HORMONE GLOBUL: CPT | Performed by: NURSE PRACTITIONER

## 2023-05-17 PROCEDURE — 84146 ASSAY OF PROLACTIN: CPT | Performed by: NURSE PRACTITIONER

## 2023-05-17 PROCEDURE — 82672 ASSAY OF ESTROGEN: CPT | Performed by: NURSE PRACTITIONER

## 2023-05-17 PROCEDURE — 36415 COLL VENOUS BLD VENIPUNCTURE: CPT | Performed by: NURSE PRACTITIONER

## 2023-05-17 PROCEDURE — 83002 ASSAY OF GONADOTROPIN (LH): CPT | Performed by: NURSE PRACTITIONER

## 2023-05-17 PROCEDURE — 83001 ASSAY OF GONADOTROPIN (FSH): CPT | Performed by: NURSE PRACTITIONER

## 2023-05-17 PROCEDURE — 84403 ASSAY OF TOTAL TESTOSTERONE: CPT | Performed by: NURSE PRACTITIONER

## 2023-05-17 PROCEDURE — 85027 COMPLETE CBC AUTOMATED: CPT | Performed by: NURSE PRACTITIONER

## 2023-05-19 LAB — ESTROGEN SERPL-MCNC: 77 PG/ML

## 2023-05-24 LAB
ALBUMIN SERPL-MCNC: 4.3 G/DL (ref 3.6–5.1)
SHBG SERPL-SCNC: 73 NMOL/L (ref 22–77)
TESTOST FREE SERPL-MCNC: 23.3 PG/ML (ref 6–73)
TESTOST SERPL-MCNC: 361 NG/DL (ref 250–1100)
TESTOSTERONE.FREE+WB SERPL-MCNC: 45.9 NG/DL (ref 15–150)

## 2023-07-12 ENCOUNTER — PATIENT MESSAGE (OUTPATIENT)
Dept: PRIMARY CARE CLINIC | Facility: CLINIC | Age: 83
End: 2023-07-12
Payer: MEDICARE

## 2023-07-12 DIAGNOSIS — R53.83 FATIGUE, UNSPECIFIED TYPE: Primary | ICD-10-CM

## 2023-07-12 NOTE — TELEPHONE ENCOUNTER
Spoke with pt, moved appt from 8/11/2023 to 7/19/2023.     Pt gets short of breath with strenuous activity. Pt can only walk about 1/2 block.   Over the past week, pt says he is feeling much worse, it is uncomfortable to walk. He says he also just runs out of energy.     Do you want to order any labs prior to appt next week.   Please advise.

## 2023-07-19 ENCOUNTER — OFFICE VISIT (OUTPATIENT)
Dept: PRIMARY CARE CLINIC | Facility: CLINIC | Age: 83
End: 2023-07-19
Payer: MEDICARE

## 2023-07-19 VITALS
HEART RATE: 61 BPM | HEIGHT: 73 IN | WEIGHT: 206.88 LBS | RESPIRATION RATE: 18 BRPM | TEMPERATURE: 98 F | SYSTOLIC BLOOD PRESSURE: 120 MMHG | DIASTOLIC BLOOD PRESSURE: 64 MMHG | BODY MASS INDEX: 27.42 KG/M2 | OXYGEN SATURATION: 96 %

## 2023-07-19 DIAGNOSIS — R06.09 DOE (DYSPNEA ON EXERTION): ICD-10-CM

## 2023-07-19 DIAGNOSIS — E78.5 DYSLIPIDEMIA: ICD-10-CM

## 2023-07-19 DIAGNOSIS — E07.9 THYROID DISEASE: ICD-10-CM

## 2023-07-19 DIAGNOSIS — R53.83 FATIGUE, UNSPECIFIED TYPE: ICD-10-CM

## 2023-07-19 DIAGNOSIS — I10 ESSENTIAL HYPERTENSION: Primary | ICD-10-CM

## 2023-07-19 PROCEDURE — 99215 PR OFFICE/OUTPT VISIT, EST, LEVL V, 40-54 MIN: ICD-10-PCS | Mod: S$PBB,,, | Performed by: INTERNAL MEDICINE

## 2023-07-19 PROCEDURE — 99215 OFFICE O/P EST HI 40 MIN: CPT | Mod: PBBFAC,PN | Performed by: INTERNAL MEDICINE

## 2023-07-19 PROCEDURE — 99999 PR PBB SHADOW E&M-EST. PATIENT-LVL V: ICD-10-PCS | Mod: PBBFAC,,, | Performed by: INTERNAL MEDICINE

## 2023-07-19 PROCEDURE — 99999 PR PBB SHADOW E&M-EST. PATIENT-LVL V: CPT | Mod: PBBFAC,,, | Performed by: INTERNAL MEDICINE

## 2023-07-19 PROCEDURE — 99215 OFFICE O/P EST HI 40 MIN: CPT | Mod: S$PBB,,, | Performed by: INTERNAL MEDICINE

## 2023-07-19 RX ORDER — LEVOTHYROXINE SODIUM 75 UG/1
75 TABLET ORAL DAILY
Qty: 90 TABLET | Refills: 0 | Status: SHIPPED | OUTPATIENT
Start: 2023-07-19 | End: 2023-08-18

## 2023-07-19 RX ORDER — BUPROPION HYDROCHLORIDE 150 MG/1
150 TABLET, EXTENDED RELEASE ORAL 3 TIMES DAILY
Qty: 90 TABLET | Refills: 1 | Status: SHIPPED | OUTPATIENT
Start: 2023-07-19 | End: 2023-08-18

## 2023-07-19 NOTE — PROGRESS NOTES
INTERNAL MEDICINE PROGRESS/URGENT CARE NOTE    CHIEF COMPLAINT     Chief Complaint   Patient presents with    Follow-up     Extreme fatigue in the past few weeks       HPI     Gilberto Lee is a 82 y.o.  male who presents for an urgent/follow up visit today.  Patient was recently seen to establish care and his main major complaint of which he was very distraught about was his fatigue. Sounded new but review of his file shows this complaint has been addressed in great detail back as far as 2015.    From our first and last meeting a month ago, we recorded:   Things discussed and ruled out today:   Cbc is normal so no anemia, thyroid function is normal. All other labs are good. Vitals are normal and show normal HR.   Things ordered to evaluate.   Sleep study  Free testosterone  Vitamin d, and vitamin b12   Echo  PFT      Long discussion re causes of fatigue which were mostly normal for him.      His wife says he sleeps all the time. He sleeps all night, but she says during the day he sleeps as well. Confusing fatigue as he has normal muscle strength. Goes to the gym 3 times per week and is able to do resistance exercise but says hes too tired and with PICKERING when he goes fishing. Has daydayime somnolence and wakes up not feeling rested. Has never had a sleep study.      Mood is normal. Has  a long history of depression but very well treated with the wellbutrin. His PHQ 9 is 6/27.         Results of the above test are:   ECHO shows normal EF of 70% with normal diastolic function. Otherwise normal function.   Full thyroid eval is normal.   Low free testosterone. Of 3.0. normal levels is 6.6-18 . Previous testosterone tests were normal, but given his age, free was checked.   CMP and other labs came back normal.   He had his sleep test done, which showed mild ANGELIQUE. Suggest machine if patient would like      HE CONSULTED with urology and again total testosterone was checked and came back normal so decided against testosterone  "injections.     Wrote in a few days again complaining of worsening fatigue.   He is scheduled to see cardiology but I doubt that will be fruitful because patient is able to exercise etc though he reported that his exercise tolerance has significantly reduced.     Today, he reports he was more fatigued for the past two weeks but since yesterday has been feeling much better. Says it acutely worsened for two weeks, then just got better as of yesterday. No other associated symptoms besides fatigue and "just not feeling well"      Home Medications:  Prior to Admission medications    Medication Sig Start Date End Date Taking? Authorizing Provider   buPROPion (WELLBUTRIN) 75 MG tablet Take 1 tablet (75 mg total) by mouth 3 (three) times daily. 5/12/23   Mercedes Clemens MD   CALCIUM CITRATE/VITAMIN D3 (CITRACAL + D ORAL) Take by mouth. Taking 2 tablets by mouth daily    Historical Provider   cyanocobalamin 1,000 mcg/mL injection Inject 1 mL (1,000 mcg total) into the muscle every 30 days. INJECT 1ML INTO THE MUSCLE  Strength: 1,000 mcg/mL 9/13/22   Baltazar Greer MD   fluticasone propionate (FLONASE) 50 mcg/actuation nasal spray 1 spray (50 mcg total) by Each Nostril route once daily. 10/26/22   Baltazar Greer MD   glucosamine-chondroit-vit C-Mn 500-400 mg capsule Take 1 tablet by mouth 3 (three) times daily. 3 Capsule Oral Every day    Historical Provider   levothyroxine (EUTHYROX) 50 MCG tablet Take 1 tablet (50 mcg total) by mouth once daily. 5/12/23   Mercedes Clemens MD   loratadine (CLARITIN) 10 mg tablet Take 10 mg by mouth once daily.    Historical Provider   losartan (COZAAR) 25 MG tablet Take 1 tablet (25 mg total) by mouth once daily. 10/26/22   Baltazar Greer MD   naproxen sodium (ANAPROX) 220 MG tablet Take 220 mg by mouth 2 (two) times daily with meals. 1 Tablet Oral Twice a day     Historical Provider   omega-3 fatty acids 1,000 mg Cap Take by mouth once daily. 1 Capsule Oral Every day    " "Historical Provider   simvastatin (ZOCOR) 20 MG tablet Take 1 tablet (20 mg total) by mouth once daily. 10/26/22   Baltazar Greer MD   sumatriptan (IMITREX) 100 MG tablet Take 1 tablet (100 mg total) by mouth once as needed for Migraine. 1/12/23 5/12/23  Baltazar Greer MD       Review of Systems:  Review of Systems      PHYSICAL EXAM     /64 (BP Location: Right arm)   Pulse 61   Temp 98.1 °F (36.7 °C) (Oral)   Resp 18   Ht 6' 1" (1.854 m)   Wt 93.9 kg (206 lb 14.4 oz)   SpO2 96%   BMI 27.30 kg/m²     GEN - A+OX4, NAD   HEENT - PERRL, EOMI, OP clear  Neck - No thyromegaly or cervical LAD. No thyroid masses felt.  CV - RRR, no m/r   Chest - CTAB, no wheezing or rhonchi  Abd - S/NT/ND/+BS.   Ext - 2+BDP and radial pulses. No C/C/E.  Skin - No rash.    LABS         ASSESSMENT/PLAN     Gilberto Lee is a 82 y.o. male with  1. Essential hypertension  BP very well controlled  Continue current regimen  -     Comprehensive Metabolic Panel; Future; Expected date: 07/19/2023  -     TSH; Future; Expected date: 07/19/2023    2. Fatigue, unspecified type  Unclear etiology and very confusing history ie doesn't match any pathophysiology.   Discussed adding abilify and he would like to see how the thyroid medicine does and then cnsider this in 4 weeks.   For now will increase synthroid to 75mcg despite normal TSH.   He will see cardiology to consider stress test considering his decreased exercise tolerance.   Overview:  5 /2015 : currently use Co Q10 supplement.  decreased folic acid intake.  fatigue improved.mild low testosterone.6/2015: improved.7/2015: worsen.  8/2015: improved little. x-ray showed mild platelike atelectasis. hx of 20 years smoking , quited in 1974.  RF , TRANG , ESR , CRP TSH wnl  per Rheumatologist PN.9/2015: improved after d/c CCB.    Orders:  -     CBC Auto Differential; Future; Expected date: 07/19/2023  -     T3; Future; Expected date: 07/19/2023  -     T4, FREE; Future; Expected date: " 07/19/2023  -     PTH, intact; Future; Expected date: 07/19/2023    3. Dyslipidemia  Overview:  5/2015: on statin ( simvastatin 20 ) before, stopped medicine in 12/2014, since it interfered with CCB.  consider add back simvastatin back with low dosage ( max 10 mg) if needed.6/2015: Total cholesterol 246, HDL 62,  .  10 year ASCVD Risk is 18.1 %.  started pravastatin 20 mg.7/2015. D/c statin by pt, 2/2 fatigue.9/2015:  continue 20 mg simvastatin. 10/2015: tolerate statin, continue 20 mg simvastatin.5/2016: cholestrol 186, , HDL 68 in 1/2016.8/2016: Total 175, HDL 61, LDL 97. continue on statin .  ALt 36 mild high in 11/2016.    Orders:  -     Lipid Panel; Future; Expected date: 07/19/2023           WORRY SCORE 2,    RTC in 6 months, sooner if needed and depending on labs.    Mercedes Clemens MD  Board Certified Internist/Geriatrician  [facility] ([place])

## 2023-07-20 ENCOUNTER — LAB VISIT (OUTPATIENT)
Dept: LAB | Facility: HOSPITAL | Age: 83
End: 2023-07-20
Attending: INTERNAL MEDICINE
Payer: MEDICARE

## 2023-07-20 ENCOUNTER — TELEPHONE (OUTPATIENT)
Dept: PRIMARY CARE CLINIC | Facility: CLINIC | Age: 83
End: 2023-07-20
Payer: MEDICARE

## 2023-07-20 DIAGNOSIS — I10 ESSENTIAL HYPERTENSION: ICD-10-CM

## 2023-07-20 DIAGNOSIS — E78.5 DYSLIPIDEMIA: ICD-10-CM

## 2023-07-20 DIAGNOSIS — R53.83 FATIGUE, UNSPECIFIED TYPE: ICD-10-CM

## 2023-07-20 LAB
ALBUMIN SERPL BCP-MCNC: 3.5 G/DL (ref 3.5–5.2)
ALP SERPL-CCNC: 66 U/L (ref 55–135)
ALT SERPL W/O P-5'-P-CCNC: 17 U/L (ref 10–44)
ANION GAP SERPL CALC-SCNC: 10 MMOL/L (ref 8–16)
AST SERPL-CCNC: 25 U/L (ref 10–40)
BASOPHILS # BLD AUTO: 0.09 K/UL (ref 0–0.2)
BASOPHILS NFR BLD: 1.1 % (ref 0–1.9)
BILIRUB SERPL-MCNC: 0.3 MG/DL (ref 0.1–1)
BUN SERPL-MCNC: 18 MG/DL (ref 8–23)
CALCIUM SERPL-MCNC: 9.5 MG/DL (ref 8.7–10.5)
CHLORIDE SERPL-SCNC: 104 MMOL/L (ref 95–110)
CHOLEST SERPL-MCNC: 171 MG/DL (ref 120–199)
CHOLEST/HDLC SERPL: 2.9 {RATIO} (ref 2–5)
CO2 SERPL-SCNC: 26 MMOL/L (ref 23–29)
CREAT SERPL-MCNC: 1.2 MG/DL (ref 0.5–1.4)
DIFFERENTIAL METHOD: ABNORMAL
EOSINOPHIL # BLD AUTO: 0.4 K/UL (ref 0–0.5)
EOSINOPHIL NFR BLD: 4.9 % (ref 0–8)
ERYTHROCYTE [DISTWIDTH] IN BLOOD BY AUTOMATED COUNT: 12.4 % (ref 11.5–14.5)
EST. GFR  (NO RACE VARIABLE): >60 ML/MIN/1.73 M^2
GLUCOSE SERPL-MCNC: 111 MG/DL (ref 70–110)
HCT VFR BLD AUTO: 42.1 % (ref 40–54)
HDLC SERPL-MCNC: 59 MG/DL (ref 40–75)
HDLC SERPL: 34.5 % (ref 20–50)
HGB BLD-MCNC: 14.1 G/DL (ref 14–18)
IMM GRANULOCYTES # BLD AUTO: 0.02 K/UL (ref 0–0.04)
IMM GRANULOCYTES NFR BLD AUTO: 0.2 % (ref 0–0.5)
LDLC SERPL CALC-MCNC: 96.6 MG/DL (ref 63–159)
LYMPHOCYTES # BLD AUTO: 1.7 K/UL (ref 1–4.8)
LYMPHOCYTES NFR BLD: 20.7 % (ref 18–48)
MCH RBC QN AUTO: 31.1 PG (ref 27–31)
MCHC RBC AUTO-ENTMCNC: 33.5 G/DL (ref 32–36)
MCV RBC AUTO: 93 FL (ref 82–98)
MONOCYTES # BLD AUTO: 0.8 K/UL (ref 0.3–1)
MONOCYTES NFR BLD: 10.3 % (ref 4–15)
NEUTROPHILS # BLD AUTO: 5 K/UL (ref 1.8–7.7)
NEUTROPHILS NFR BLD: 62.8 % (ref 38–73)
NONHDLC SERPL-MCNC: 112 MG/DL
NRBC BLD-RTO: 0 /100 WBC
PLATELET # BLD AUTO: 266 K/UL (ref 150–450)
PMV BLD AUTO: 8.7 FL (ref 9.2–12.9)
POTASSIUM SERPL-SCNC: 4.1 MMOL/L (ref 3.5–5.1)
PROT SERPL-MCNC: 7.4 G/DL (ref 6–8.4)
PTH-INTACT SERPL-MCNC: 56.5 PG/ML (ref 9–77)
RBC # BLD AUTO: 4.54 M/UL (ref 4.6–6.2)
SODIUM SERPL-SCNC: 140 MMOL/L (ref 136–145)
T4 FREE SERPL-MCNC: 1.04 NG/DL (ref 0.71–1.51)
TRIGL SERPL-MCNC: 77 MG/DL (ref 30–150)
TSH SERPL DL<=0.005 MIU/L-ACNC: 4.04 UIU/ML (ref 0.4–4)
WBC # BLD AUTO: 8.03 K/UL (ref 3.9–12.7)

## 2023-07-20 PROCEDURE — 84439 ASSAY OF FREE THYROXINE: CPT | Performed by: INTERNAL MEDICINE

## 2023-07-20 PROCEDURE — 83970 ASSAY OF PARATHORMONE: CPT | Performed by: INTERNAL MEDICINE

## 2023-07-20 PROCEDURE — 36415 COLL VENOUS BLD VENIPUNCTURE: CPT | Performed by: INTERNAL MEDICINE

## 2023-07-20 PROCEDURE — 80053 COMPREHEN METABOLIC PANEL: CPT | Performed by: INTERNAL MEDICINE

## 2023-07-20 PROCEDURE — 80061 LIPID PANEL: CPT | Performed by: INTERNAL MEDICINE

## 2023-07-20 PROCEDURE — 85025 COMPLETE CBC W/AUTO DIFF WBC: CPT | Performed by: INTERNAL MEDICINE

## 2023-07-20 PROCEDURE — 84443 ASSAY THYROID STIM HORMONE: CPT | Performed by: INTERNAL MEDICINE

## 2023-07-20 NOTE — TELEPHONE ENCOUNTER
----- Message from Mercedes Clemens MD sent at 7/20/2023 11:17 AM CDT -----  Regarding his recent labs:   Thyroid slightly abnormal. Maybe would benefit from the increased dose.   CMP shows normal lytes, normal liver and kidney function. His glucose is a bit too high. Limit carbs sweets and sugars.   CBC shows normal blood counts with no anemia. No significant abnormalities in other cell counts.   Cholesterol is very well controlled.   PTH while higher than a year ago is still in the normal range and so is his calcium level.     So overall, labs are really good.

## 2023-07-21 LAB — T3 SERPL IA-MCNC: 89 NG/DL (ref 80–200)

## 2023-07-23 ENCOUNTER — PATIENT MESSAGE (OUTPATIENT)
Dept: PRIMARY CARE CLINIC | Facility: CLINIC | Age: 83
End: 2023-07-23
Payer: MEDICARE

## 2023-07-24 NOTE — TELEPHONE ENCOUNTER
Pt was seen on 7/19/2023, had labs drawn on 7/20/2023 in [place].    It looks like a sed rate and crp was missed.     I'm just making sure you want these labs, I can schedule pt once I know for sure.   I do not see these 2 labs addressed in your note.

## 2023-07-25 ENCOUNTER — OFFICE VISIT (OUTPATIENT)
Dept: FAMILY MEDICINE | Facility: CLINIC | Age: 83
End: 2023-07-25
Payer: MEDICARE

## 2023-07-25 VITALS
HEIGHT: 73 IN | BODY MASS INDEX: 27.53 KG/M2 | OXYGEN SATURATION: 95 % | DIASTOLIC BLOOD PRESSURE: 80 MMHG | HEART RATE: 62 BPM | WEIGHT: 207.69 LBS | SYSTOLIC BLOOD PRESSURE: 136 MMHG

## 2023-07-25 DIAGNOSIS — E78.2 MIXED HYPERLIPIDEMIA: ICD-10-CM

## 2023-07-25 DIAGNOSIS — F32.0 MILD MAJOR DEPRESSION: ICD-10-CM

## 2023-07-25 DIAGNOSIS — I10 ESSENTIAL HYPERTENSION: ICD-10-CM

## 2023-07-25 DIAGNOSIS — Z00.00 ENCOUNTER FOR PREVENTIVE HEALTH EXAMINATION: Primary | ICD-10-CM

## 2023-07-25 PROCEDURE — G0439 PR MEDICARE ANNUAL WELLNESS SUBSEQUENT VISIT: ICD-10-PCS | Mod: ,,, | Performed by: NURSE PRACTITIONER

## 2023-07-25 PROCEDURE — 99213 OFFICE O/P EST LOW 20 MIN: CPT | Mod: PBBFAC,PO | Performed by: NURSE PRACTITIONER

## 2023-07-25 PROCEDURE — 99999 PR PBB SHADOW E&M-EST. PATIENT-LVL III: CPT | Mod: PBBFAC,,, | Performed by: NURSE PRACTITIONER

## 2023-07-25 PROCEDURE — G0439 PPPS, SUBSEQ VISIT: HCPCS | Mod: ,,, | Performed by: NURSE PRACTITIONER

## 2023-07-25 PROCEDURE — 99999 PR PBB SHADOW E&M-EST. PATIENT-LVL III: ICD-10-PCS | Mod: PBBFAC,,, | Performed by: NURSE PRACTITIONER

## 2023-07-25 NOTE — PATIENT INSTRUCTIONS
Counseling and Referral of Other Preventative  (Italic type indicates deductible and co-insurance are waived)    Patient Name: Gilberto Lee  Today's Date: 7/25/2023    Health Maintenance       Date Due Completion Date    COVID-19 Vaccine (5 - Moderna series) 04/02/2023 12/2/2022    Influenza Vaccine (1) 09/01/2023 10/19/2022    TETANUS VACCINE 08/29/2024 8/29/2014    Override on 10/21/2011: Done    Lipid Panel 07/20/2028 7/20/2023    Override on 8/1/2016: Done        No orders of the defined types were placed in this encounter.      The following information is provided to all patients.  This information is to help you find resources for any of the problems found today that may be affecting your health:                Living healthy guide: www.[facility].[place].gov      Understanding Diabetes: www.diabetes.org      Eating healthy: www.cdc.gov/healthyweight      CDC home safety checklist: www.cdc.gov/steadi/patient.html      Agency on Aging: www.goea.[place].[facility]      Alcoholics anonymous (AA): www.aa.org      Physical Activity: www.abiel.nih.gov/xz2wznm      Tobacco use: www.quitwithusla.org

## 2023-07-25 NOTE — PROGRESS NOTES
"  Gilberto Lee presented for a  Medicare AWV and comprehensive Health Risk Assessment today. The following components were reviewed and updated:    Medical history  Family History  Social history  Allergies and Current Medications  Health Risk Assessment  Health Maintenance  Care Team         ** See Completed Assessments for Annual Wellness Visit within the encounter summary.**         The following assessments were completed:  Living Situation  CAGE  Depression Screening  Timed Get Up and Go  Whisper Test  Cognitive Function Screening      Nutrition Screening  ADL Screening  PAQ Screening    Review for Opioid Screening: Patient does not have rx for Opioids.    Review for Substance Use Disorders: Patient does not use substance.     Vitals:    07/25/23 1433   BP: 136/80   BP Location: Left arm   Patient Position: Sitting   BP Method: Medium (Manual)   Pulse: 62   SpO2: 95%   Weight: 94.2 kg (207 lb 10.8 oz)   Height: 6' 1" (1.854 m)     Body mass index is 27.4 kg/m².  Physical Exam  Vitals reviewed.   Constitutional:       General: He is not in acute distress.  HENT:      Head: Normocephalic.   Cardiovascular:      Rate and Rhythm: Normal rate.      Pulses: Normal pulses.   Pulmonary:      Effort: Pulmonary effort is normal. No respiratory distress.   Skin:     General: Skin is warm.   Neurological:      General: No focal deficit present.      Mental Status: He is alert.   Psychiatric:         Mood and Affect: Mood normal.               Diagnoses and health risks identified today and associated recommendations/orders:    1. Encounter for preventive health examination  Reviewed health maintenance and provided recommendations    UTD on     2. Mild major depression  Continue to monitor  Followed by Mercedes Clemens MD   No si/hi.      3. Mixed hyperlipidemia  Continue to monitor  Followed by Mercedes Clemens MD .      4. Essential hypertension  Continue to monitor  Followed by Mercedes Clemens MD .  "       Provided Gilberto with a 5-10 year written screening schedule and personal prevention plan. Recommendations were developed using the USPSTF age appropriate recommendations. Education, counseling, and referrals were provided as needed. After Visit Summary printed and given to patient which includes a list of additional screenings\tests needed.    Follow up in one year    JEROD Alston offered to discuss advanced care planning, including how to pick a person who would make decisions for you if you were unable to make them for yourself, called a health care power of , and what kind of decisions you might make such as use of life sustaining treatments such as ventilators and tube feeding when faced with a life limiting illness recorded on a living will that they will need to know. (How you want to be cared for as you near the end of your natural life)     X  Patient has advanced directives on file, which we reviewed, and they do not wish to make changes.

## 2023-08-18 ENCOUNTER — OFFICE VISIT (OUTPATIENT)
Dept: PRIMARY CARE CLINIC | Facility: CLINIC | Age: 83
End: 2023-08-18
Payer: MEDICARE

## 2023-08-18 ENCOUNTER — TELEPHONE (OUTPATIENT)
Dept: ENDOCRINOLOGY | Facility: CLINIC | Age: 83
End: 2023-08-18
Payer: MEDICARE

## 2023-08-18 VITALS
RESPIRATION RATE: 18 BRPM | BODY MASS INDEX: 27.72 KG/M2 | DIASTOLIC BLOOD PRESSURE: 82 MMHG | TEMPERATURE: 98 F | HEART RATE: 62 BPM | WEIGHT: 209.13 LBS | OXYGEN SATURATION: 97 % | SYSTOLIC BLOOD PRESSURE: 130 MMHG | HEIGHT: 73 IN

## 2023-08-18 DIAGNOSIS — E03.9 ACQUIRED HYPOTHYROIDISM: ICD-10-CM

## 2023-08-18 DIAGNOSIS — R53.82 CHRONIC FATIGUE: Primary | ICD-10-CM

## 2023-08-18 DIAGNOSIS — E07.9 THYROID DISEASE: ICD-10-CM

## 2023-08-18 DIAGNOSIS — R06.02 SOB (SHORTNESS OF BREATH): ICD-10-CM

## 2023-08-18 PROCEDURE — 99999 PR PBB SHADOW E&M-EST. PATIENT-LVL V: CPT | Mod: PBBFAC,,, | Performed by: INTERNAL MEDICINE

## 2023-08-18 PROCEDURE — 99999 PR PBB SHADOW E&M-EST. PATIENT-LVL V: ICD-10-PCS | Mod: PBBFAC,,, | Performed by: INTERNAL MEDICINE

## 2023-08-18 PROCEDURE — 99214 OFFICE O/P EST MOD 30 MIN: CPT | Mod: S$PBB,,, | Performed by: INTERNAL MEDICINE

## 2023-08-18 PROCEDURE — 99214 PR OFFICE/OUTPT VISIT, EST, LEVL IV, 30-39 MIN: ICD-10-PCS | Mod: S$PBB,,, | Performed by: INTERNAL MEDICINE

## 2023-08-18 PROCEDURE — 99215 OFFICE O/P EST HI 40 MIN: CPT | Mod: PBBFAC,PN | Performed by: INTERNAL MEDICINE

## 2023-08-18 RX ORDER — BUPROPION HYDROCHLORIDE 150 MG/1
150 TABLET, EXTENDED RELEASE ORAL 2 TIMES DAILY
Qty: 90 TABLET | Refills: 1 | Status: SHIPPED | OUTPATIENT
Start: 2023-08-18 | End: 2024-08-17

## 2023-08-18 RX ORDER — ARIPIPRAZOLE 2 MG/1
2 TABLET ORAL DAILY
Qty: 30 TABLET | Refills: 0 | Status: SHIPPED | OUTPATIENT
Start: 2023-08-18 | End: 2023-10-02

## 2023-08-18 RX ORDER — LEVOTHYROXINE SODIUM 100 UG/1
100 TABLET ORAL DAILY
Qty: 30 TABLET | Refills: 0 | Status: SHIPPED | OUTPATIENT
Start: 2023-08-18 | End: 2023-10-02

## 2023-08-18 NOTE — PROGRESS NOTES
INTERNAL MEDICINE PROGRESS/URGENT CARE NOTE    CHIEF COMPLAINT     Chief Complaint   Patient presents with    Follow-up     Follow up about fatigue  Concerned about pt bp being low in the morning & climbing through out the day  Pt wants to mention that's his last visit his 02 sats have been low and states he does experience occasional sob in the mornings        HPI   Gilberto Lee is a 82 y.o.  male who presents for an urgent/follow up visit today.  Patient was recently seen to establish care and his main major complaint of which he was very distraught about was his fatigue. Sounded new but review of his file shows this complaint has been addressed in great detail back as far as 2015.    From our first and last meeting a month ago, we recorded:   Things discussed and ruled out today:   Cbc is normal so no anemia, thyroid function is normal. All other labs are good. Vitals are normal and show normal HR.   Things ordered to evaluate.   Sleep study  Free testosterone  Vitamin d, and vitamin b12   Echo  PFT      Long discussion re causes of fatigue which were mostly normal for him.      His wife says he sleeps all the time. He sleeps all night, but she says during the day he sleeps as well. Confusing fatigue as he has normal muscle strength. Goes to the gym 3 times per week and is able to do resistance exercise but says hes too tired and with PICKERING when he goes fishing. Has daydayime somnolence and wakes up not feeling rested. Has never had a sleep study.      Mood is normal. Has  a long history of depression but very well treated with the wellbutrin. His PHQ 9 is 6/27.         Results of the above test are:   ECHO shows normal EF of 70% with normal diastolic function. Otherwise normal function.   Full thyroid eval is normal.   Low free testosterone. Of 3.0. normal levels is 6.6-18 . Previous testosterone tests were normal, but given his age, free was checked.   CMP and other labs came back normal.   He had his sleep  "test done, which showed mild ANGELIQUE. Suggest machine if patient would like      HE CONSULTED with urology and again total testosterone was checked and came back normal so decided against testosterone injections.      Wrote in a few days again complaining of worsening fatigue.   He is scheduled to see cardiology but I doubt that will be fruitful because patient is able to exercise etc though he reported that his exercise tolerance has significantly reduced.      At his last visit, he reports he was more fatigued for the past two weeks but since yesterday has been feeling much better. Says it acutely worsened for two weeks, then just got better as of yesterday. No other associated symptoms besides fatigue and "just not feeling well"  We increased the dose of his thyroid medication and his well butrin fro 75mg to 150mg, and had him return to re evaluate.     So patient today reports that despite me writing for syntrhoid 75mg he self tritrated his medication to 125mg. Note TSH was barely over 4, we just thought wed try to increase from 50mg to get some effect. Note T3 and T4 was wbnl.   Says not taht hes on the 125mg, hes feeling much better. Not sure I can agree to such a huge increase from 50mg to 125mg. So I will try the 100mg. Wife and patient requested to see endocrinology    This degree of fatigue is quite concerning. His fatigue is asosciated with SOB. Again normal lung exam. Normal PFT. Is able to exercise well when not so fatigued. So not suggestive of cardiac disease, however have referred him to cardiology to further evaluate.     Home Medications:  Prior to Admission medications    Medication Sig Start Date End Date Taking? Authorizing Provider   buPROPion (WELLBUTRIN SR) 150 MG TBSR 12 hr tablet Take 1 tablet (150 mg total) by mouth 3 (three) times daily. 7/19/23 7/18/24  Mercedes Clemens MD   CALCIUM CITRATE/VITAMIN D3 (CITRACAL + D ORAL) Take by mouth. Taking 2 tablets by mouth daily    Provider, " "Historical   fluticasone propionate (FLONASE) 50 mcg/actuation nasal spray 1 spray (50 mcg total) by Each Nostril route once daily. 10/26/22   Baltazar Greer MD   glucosamine-chondroit-vit C-Mn 500-400 mg capsule Take 1 tablet by mouth 3 (three) times daily. 3 Capsule Oral Every day    Provider, Historical   levothyroxine (EUTHYROX) 75 MCG tablet Take 1 tablet (75 mcg total) by mouth once daily. 7/19/23   Mercedes Clemens MD   loratadine (CLARITIN) 10 mg tablet Take 10 mg by mouth once daily.    Provider, Historical   losartan (COZAAR) 25 MG tablet Take 1 tablet (25 mg total) by mouth once daily. 10/26/22   Baltazar Greer MD   naproxen sodium (ANAPROX) 220 MG tablet Take 220 mg by mouth 2 (two) times daily with meals. 1 Tablet Oral Twice a day     Provider, Historical   omega-3 fatty acids 1,000 mg Cap Take by mouth once daily. 1 Capsule Oral Every day    Provider, Historical   simvastatin (ZOCOR) 20 MG tablet Take 1 tablet (20 mg total) by mouth once daily. 10/26/22   Baltazar Greer MD   sumatriptan (IMITREX) 100 MG tablet Take 1 tablet (100 mg total) by mouth once as needed for Migraine. 1/12/23 7/19/23  Baltazar Greer MD             PHYSICAL EXAM     /82 (BP Location: Left arm, Patient Position: Sitting, BP Method: Medium (Manual))   Pulse 62   Temp 97.7 °F (36.5 °C) (Oral)   Resp 18   Ht 6' 1" (1.854 m)   Wt 94.8 kg (209 lb 1.7 oz)   SpO2 97%   BMI 27.59 kg/m²     GEN - A+OX4, NAD   HEENT - PERRL, EOMI, OP clear  Neck - No thyromegaly or cervical LAD. No thyroid masses felt.  CV - RRR, no m/r   Chest - CTAB, no wheezing or rhonchi  Abd - S/NT/ND/+BS.   Ext - 2+BDP and radial pulses. No C/C/E.  Skin - No rash.    LABS         ASSESSMENT/PLAN     Gilberto Lee is a 82 y.o. male with  1. Chronic fatigue  Very thorough work up with no results  Trial abilify  Continue on wellbutrin    2. Acquired hypothyroidism  -     Ambulatory referral/consult to Endocrinology; Future; Expected date: " 08/25/2023    Other orders  -     buPROPion (WELLBUTRIN SR) 150 MG TBSR 12 hr tablet; Take 1 tablet (150 mg total) by mouth 2 (two) times daily.  Dispense: 90 tablet; Refill: 1           WORRY SCORE 3    RTC in 3 months, sooner if needed and depending on labs.    Mercedes Clemens MD  Board Certified Internist/Geriatrician  [facility] ([place])

## 2023-08-18 NOTE — TELEPHONE ENCOUNTER
----- Message from Rosetta June MA sent at 8/14/2023  7:42 AM CDT -----  Regarding: FW: appt 2nd Attempt  Contact: [phone]    ----- Message -----  From: Marbin Noyola  Sent: 8/11/2023  10:20 AM CDT  To: Germain Killian Staff  Subject: appt 2nd Attempt                                 Patient wife is calling to schedule a appt as soon as possible  ....... No available dates.... Please call and advise @[phone]

## 2023-08-21 ENCOUNTER — PATIENT MESSAGE (OUTPATIENT)
Dept: PRIMARY CARE CLINIC | Facility: CLINIC | Age: 83
End: 2023-08-21
Payer: MEDICARE

## 2023-08-30 ENCOUNTER — OFFICE VISIT (OUTPATIENT)
Dept: CARDIOLOGY | Facility: CLINIC | Age: 83
End: 2023-08-30
Payer: MEDICARE

## 2023-08-30 ENCOUNTER — TELEPHONE (OUTPATIENT)
Dept: CARDIOLOGY | Facility: CLINIC | Age: 83
End: 2023-08-30
Payer: MEDICARE

## 2023-08-30 VITALS
HEART RATE: 56 BPM | SYSTOLIC BLOOD PRESSURE: 138 MMHG | RESPIRATION RATE: 16 BRPM | DIASTOLIC BLOOD PRESSURE: 80 MMHG | HEIGHT: 73 IN | OXYGEN SATURATION: 98 % | BODY MASS INDEX: 27.57 KG/M2 | WEIGHT: 208 LBS

## 2023-08-30 DIAGNOSIS — I45.2 BIFASCICULAR BUNDLE BRANCH BLOCK: ICD-10-CM

## 2023-08-30 DIAGNOSIS — R53.82 CHRONIC FATIGUE: ICD-10-CM

## 2023-08-30 DIAGNOSIS — I10 ESSENTIAL HYPERTENSION: ICD-10-CM

## 2023-08-30 DIAGNOSIS — R00.1 BRADYCARDIA: ICD-10-CM

## 2023-08-30 DIAGNOSIS — E34.9 TESTOSTERONE DEFICIENCY: ICD-10-CM

## 2023-08-30 DIAGNOSIS — R06.09 DOE (DYSPNEA ON EXERTION): Primary | ICD-10-CM

## 2023-08-30 DIAGNOSIS — R94.31 NONSPECIFIC ABNORMAL ELECTROCARDIOGRAM (ECG) (EKG): ICD-10-CM

## 2023-08-30 DIAGNOSIS — R79.89 INCREASED PTH LEVEL: ICD-10-CM

## 2023-08-30 DIAGNOSIS — E03.9 ACQUIRED HYPOTHYROIDISM: ICD-10-CM

## 2023-08-30 DIAGNOSIS — Z72.820 SLEEP DEPRIVATION: ICD-10-CM

## 2023-08-30 DIAGNOSIS — I51.89 DIASTOLIC DYSFUNCTION WITHOUT HEART FAILURE: ICD-10-CM

## 2023-08-30 DIAGNOSIS — I44.0 FIRST DEGREE AV BLOCK: ICD-10-CM

## 2023-08-30 PROCEDURE — 99205 PR OFFICE/OUTPT VISIT, NEW, LEVL V, 60-74 MIN: ICD-10-PCS | Mod: S$PBB,,, | Performed by: INTERNAL MEDICINE

## 2023-08-30 PROCEDURE — 99999 PR PBB SHADOW E&M-EST. PATIENT-LVL IV: CPT | Mod: PBBFAC,,, | Performed by: INTERNAL MEDICINE

## 2023-08-30 PROCEDURE — 93005 ELECTROCARDIOGRAM TRACING: CPT | Mod: PBBFAC,PN | Performed by: INTERNAL MEDICINE

## 2023-08-30 PROCEDURE — 99999 PR PBB SHADOW E&M-EST. PATIENT-LVL IV: ICD-10-PCS | Mod: PBBFAC,,, | Performed by: INTERNAL MEDICINE

## 2023-08-30 PROCEDURE — 93010 ELECTROCARDIOGRAM REPORT: CPT | Mod: S$PBB,,, | Performed by: INTERNAL MEDICINE

## 2023-08-30 PROCEDURE — 99214 OFFICE O/P EST MOD 30 MIN: CPT | Mod: PBBFAC,PN | Performed by: INTERNAL MEDICINE

## 2023-08-30 PROCEDURE — 93010 EKG 12-LEAD: ICD-10-PCS | Mod: S$PBB,,, | Performed by: INTERNAL MEDICINE

## 2023-08-30 PROCEDURE — 99205 OFFICE O/P NEW HI 60 MIN: CPT | Mod: S$PBB,,, | Performed by: INTERNAL MEDICINE

## 2023-08-30 NOTE — PROGRESS NOTES
Subjective:    Patient ID:  Gilberto Lee is a 82 y.o. male     Chief Complaint   Patient presents with    Fatigue    Shortness of Breath    Establish Care       HPI:  Mr Gilberto Lee is a 82 y.o. male is here for initial consultation.    Patient has been having episodes of low energy and does not feel like doing anything.  But according to his wife he has been sleeping most of the day.    Patient was evaluated by his primary physician who gave him thyroid medication and it has helped him to boost up his energy.    Patient also has had episodes of increased shortness of breath and dyspnea on exertion along with the episodes of fatigue and tiredness and in the morning hours his blood pressure runs less than 100.  Patient denies any chest pain or tightness or heaviness denies any loss of consciousness falls or head injury.    Patient has been sleeping around 12 30 in the morning and he wakes up around 3:00 a.m. in the morning to take his thyroid pill and at 7:00 a.m. he takes his blood pressure medications.    Review of patient's allergies indicates:   Allergen Reactions    Demerol  [meperidine]      Agitation  Other reaction(s): Unknown       Past Medical History:   Diagnosis Date    Arthritis     Atelectasis of left lung 07/2015    Bradycardia 07/17/2015    Calcium blood increased 08/09/2016    Depression     Diastolic dysfunction without heart failure 07/2015    Elevated BUN 08/09/2016    Fatigue 04/29/2015    Hyperglycemia 08/09/2016    Hyperlipidemia     Migraine without aura 10/2015    Prehypertension 11/2016    Testosterone deficiency 04/2015    Thyroid disease      Past Surgical History:   Procedure Laterality Date    ACHILLES TENDON SURGERY      APPENDECTOMY      DUPUYTREN CONTRACTURE RELEASE  2014    LUMBAR EPIDURAL INJECTION  2013    PROSTATE SURGERY      SACROILIAC JOINT INJECTION      TONSILLECTOMY      TRANSURETHRAL RESECTION OF PROSTATE       Social History     Tobacco Use    Smoking status: Former      Current packs/day: 0.00     Types: Cigarettes     Quit date: 1972     Years since quittin.1    Smokeless tobacco: Never   Substance Use Topics    Alcohol use: Yes     Comment: occassional    Drug use: No     Family History   Problem Relation Age of Onset    COPD Father         Review of Systems:   Constitution: Negative for diaphoresis and fever.  Fatigue  HEENT: Negative for nosebleeds.    Cardiovascular: Negative for chest pain       Intermittent dyspnea on exertion       No leg swelling        No palpitations  Respiratory: Negative for shortness of breath and wheezing.    Hematologic/Lymphatic: Negative for bleeding problem. Does not bruise/bleed easily.   Skin: Negative for color change and rash.   Musculoskeletal: Negative for falls and myalgias.   Gastrointestinal: Negative for hematemesis and hematochezia.   Genitourinary: Negative for hematuria.   Neurological: Negative for dizziness and light-headedness.   Psychiatric/Behavioral: Negative for altered mental status and memory loss.          Objective:        Vitals:    23 1612   BP: 138/80   Pulse: (!) 56   Resp: 16       Lab Results   Component Value Date    WBC 8.03 2023    HGB 14.1 2023    HCT 42.1 2023     2023    CHOL 171 2023    TRIG 77 2023    HDL 59 2023    ALT 17 2023    AST 25 2023     2023    K 4.1 2023     2023    CREATININE 1.2 2023    BUN 18 2023    CO2 26 2023    TSH 4.036 (H) 2023    PSA 1.75 2013    HGBA1C 5.6 2017        ECHOCARDIOGRAM RESULTS  Results for orders placed during the hospital encounter of 23    Echo    Interpretation Summary  · The left ventricle is normal in size with concentric remodeling and normal systolic function.  · The estimated ejection fraction is 70%.  · Normal left ventricular diastolic function.  · Normal right ventricular size with normal right ventricular  systolic function.  · Moderate left atrial enlargement.        CURRENT/PREVIOUS VISIT EKG    No valid procedures specified.   No results found for this or any previous visit.      Physical Exam:  CONSTITUTIONAL: No fever, no chills  HEENT: Normocephalic, atraumatic,pupils reactive to light                 NECK:  No JVD no carotid bruit  CVS: S1S2+, RRR, systolic murmurs,   LUNGS: Clear  ABDOMEN: Soft, NT, BS+  EXTREMITIES: No cyanosis, edema  : No de catheter  NEURO: AAO X 3  PSY: Normal affect      Medication List with Changes/Refills   Current Medications    ACETYLCARNITINE HCL (ACETYL L-CARNITINE ORAL)    3 (three) times daily.    ARIPIPRAZOLE (ABILIFY) 2 MG TAB    Take 1 tablet (2 mg total) by mouth once daily.    BUPROPION (WELLBUTRIN SR) 150 MG TBSR 12 HR TABLET    Take 1 tablet (150 mg total) by mouth 2 (two) times daily.    CALCIUM CITRATE/VITAMIN D3 (CITRACAL + D ORAL)    Take by mouth. Taking 2 tablets by mouth daily    FLUTICASONE PROPIONATE (FLONASE) 50 MCG/ACTUATION NASAL SPRAY    1 spray (50 mcg total) by Each Nostril route once daily.    GLUCOSAMINE-CHONDROIT-VIT C--400 MG CAPSULE    Take 1 tablet by mouth 3 (three) times daily. 3 Capsule Oral Every day    LEVOTHYROXINE (EUTHYROX) 100 MCG TABLET    Take 1 tablet (100 mcg total) by mouth once daily.    LORATADINE (CLARITIN) 10 MG TABLET    Take 10 mg by mouth once daily.    LOSARTAN (COZAAR) 25 MG TABLET    Take 1 tablet (25 mg total) by mouth once daily.    NAPROXEN SODIUM (ANAPROX) 220 MG TABLET    Take 220 mg by mouth 2 (two) times daily with meals. 1 Tablet Oral Twice a day     OMEGA-3 FATTY ACIDS 1,000 MG CAP    Take by mouth once daily. 1 Capsule Oral Every day    SIMVASTATIN (ZOCOR) 20 MG TABLET    Take 1 tablet (20 mg total) by mouth once daily.    SUMATRIPTAN (IMITREX) 100 MG TABLET    Take 1 tablet (100 mg total) by mouth once as needed for Migraine.    VIT C/E/ZN/COPPR/LUTEIN/ZEAXAN (PRESERVISION AREDS-2 ORAL)    Take by mouth  once daily.             Assessment:       1. PICKERING (dyspnea on exertion)    2. Chronic fatigue    3. First degree AV block    4. Bifascicular bundle branch block    5. Bradycardia    6. Diastolic dysfunction without heart failure    7. Essential hypertension    8. Acquired hypothyroidism    9. Sleep deprivation    10. Increased PTH level    11. Testosterone deficiency    12. Nonspecific abnormal electrocardiogram (ECG) (EKG)         Plan:   1. Dyspnea on exertion./shortness breath   Patient has been having episodes of shortness breath in the morning.  Etiology is unclear could be multifactorial.  Need further cardiac evaluation to rule out anginal equivalent.    Will schedule him for exercise stress myocardial perfusion study.    2. Chronic fatigue   Again chronic fatigue could be multifactorial.  Patient has bradycardia he also has bifascicular block and it is possible that he could be chronotropic incompetence and the stress test will help us to differentiated.  Need to rule out complete heart block intermittently.  Which is possibly leading to fatigue.  Will do Zio for 1 week.  To evaluate for chronic fatigue.  3. First-degree AV block/bifascicular block./bradycardia   Will do cardiac monitoring with Zio for 1 week.  4. Diastolic dysfunction with a heart heart failure  At the present time patient is stable.  And is currently on losartan continue the same.  5. Hypothyroidism   Patient is on levothyroxine 100 mcg p.o. daily continue the same.  Patient to follow-up with his primary physician and in the process also probably needs to find in the endocrinologist.  6. Sleep deprivation   Discussed with patient and his wife in regards with sleep deprivation patient's sleeping habits have been worrisome as he sleeps at 12:30 p.m. in the morning and wakes up at 3:00 a.m. in the morning and again wakes up at 7:00 a.m. and has significant interrupted sleep which is also causing  chronic fatigue as well as daytime  somnolence.  Patient had study sleep apnea  7. EKG   Reviewed his EKG independently patient is in sinus bradycardia with sinus arrhythmia with first-degree AV block with right bundle branch block and left anterior fascicular block with a heart rate of 56 beats per minute  8. I will see him back in the office after the testing has been completed.          Problem List Items Addressed This Visit          Cardiac/Vascular    Essential hypertension    Diastolic dysfunction without heart failure    Overview     grade I, per TTE in 7/2015.  referral to cardiology.saw dr. Zamora, who told him not to come back that hes fine. That was 8 years ago and now with his PICKERING, would like to repeat ECHO.          Bradycardia    Overview     7/2015; advise pt to decreased ccb to half.9/2015: improved bradycardia after decreased CCB 120mg from 240 BID. 10/2015: d/c ccb for three weeks. stable.            Endocrine    Testosterone deficiency    Overview     total testosterone 268  low , TSH wnl in 4/2015.6/2015:  second testesterone, LH, FSH, and prolactin wnl         Increased PTH level    Acquired hypothyroidism       Other    Chronic fatigue    Sleep deprivation     Other Visit Diagnoses       PICKERING (dyspnea on exertion)    -  Primary    Relevant Orders    IN OFFICE EKG 12-LEAD (to [facility])    First degree AV block        Bifascicular bundle branch block        Nonspecific abnormal electrocardiogram (ECG) (EKG)                No follow-ups on file.

## 2023-09-01 NOTE — PROGRESS NOTES
Regarding his recent labs:   Thyroid slightly abnormal. Maybe would benefit from the increased dose.   CMP shows normal lytes, normal liver and kidney function. His glucose is a bit too high. Limit carbs sweets and sugars.   CBC shows normal blood counts with no anemia. No significant abnormalities in other cell counts.   Cholesterol is very well controlled.   PTH while higher than a year ago is still in the normal range and so is his calcium level.     So overall, labs are really good.  Dutasteride Pregnancy And Lactation Text: This medication is absolutely contraindicated in women, especially during pregnancy and breast feeding. Feminization of male fetuses is possible if taking while pregnant.

## 2023-09-14 ENCOUNTER — PATIENT MESSAGE (OUTPATIENT)
Dept: CARDIOLOGY | Facility: CLINIC | Age: 83
End: 2023-09-14
Payer: MEDICARE

## 2023-09-14 ENCOUNTER — TELEPHONE (OUTPATIENT)
Dept: CARDIOLOGY | Facility: CLINIC | Age: 83
End: 2023-09-14
Payer: MEDICARE

## 2023-09-14 NOTE — TELEPHONE ENCOUNTER
----- Message from Anastacio Dutta sent at 9/14/2023  9:16 AM CDT -----  Regarding: Return Call  Contact: patient  Type:  Patient Returning Call    Who Called:patient  Who Left Message for Patient:office nurse  Does the patient know what this is regarding?:results of Holter Monitor  Would the patient rather a call back or a response via [facility]? Please call  Best Call Back Number:[phone]  Additional Information:

## 2023-09-18 ENCOUNTER — TELEPHONE (OUTPATIENT)
Dept: CARDIOLOGY | Facility: HOSPITAL | Age: 83
End: 2023-09-18

## 2023-09-18 NOTE — TELEPHONE ENCOUNTER
Left message on voicemail.     Patient advised, test will be at [facility] ([address]   Will need to register on the first floor at the main entrance.   Patient advised that arrival time is 7:40am.  Patient advised that he may be here about 3.5-4 hours, and may want to bring something to occupy their time, as there will be periods of waiting.    Patient advised, may take his medications prior to testing if you need to.  Advised if he needs to eat to take his medications, please keep it light, like toast and juice.    Patient advised to avoid all caffeine 12 hours prior to testing.  This includes decaf tea and coffee.    Will provide peanut butter crackers for a snack after stress test.  If patient would prefer something else, please bring a snack from home.    Wear comfortable clothing.   No lotions, oils, or powders to the upper chest area. May wear deodorant.    No metal jewelry, buttons, or zippers to the upper body.  Advised to call the office if any questions.     Will send instructions via [facility] as well.

## 2023-09-19 ENCOUNTER — HOSPITAL ENCOUNTER (OUTPATIENT)
Dept: CARDIOLOGY | Facility: HOSPITAL | Age: 83
Discharge: HOME | End: 2023-09-19
Attending: INTERNAL MEDICINE
Payer: MEDICARE

## 2023-09-19 ENCOUNTER — HOSPITAL ENCOUNTER (OUTPATIENT)
Dept: RADIOLOGY | Facility: HOSPITAL | Age: 83
Discharge: HOME | End: 2023-09-19
Attending: INTERNAL MEDICINE
Payer: MEDICARE

## 2023-09-19 ENCOUNTER — PATIENT MESSAGE (OUTPATIENT)
Dept: PRIMARY CARE CLINIC | Facility: CLINIC | Age: 83
End: 2023-09-19
Payer: MEDICARE

## 2023-09-19 DIAGNOSIS — I10 ESSENTIAL HYPERTENSION: ICD-10-CM

## 2023-09-19 DIAGNOSIS — R00.1 BRADYCARDIA: ICD-10-CM

## 2023-09-19 DIAGNOSIS — I45.2 BIFASCICULAR BUNDLE BRANCH BLOCK: ICD-10-CM

## 2023-09-19 DIAGNOSIS — E03.9 ACQUIRED HYPOTHYROIDISM: ICD-10-CM

## 2023-09-19 DIAGNOSIS — Z72.820 SLEEP DEPRIVATION: ICD-10-CM

## 2023-09-19 DIAGNOSIS — I51.89 DIASTOLIC DYSFUNCTION WITHOUT HEART FAILURE: ICD-10-CM

## 2023-09-19 DIAGNOSIS — I44.0 FIRST DEGREE AV BLOCK: ICD-10-CM

## 2023-09-19 DIAGNOSIS — E34.9 TESTOSTERONE DEFICIENCY: ICD-10-CM

## 2023-09-19 DIAGNOSIS — R94.31 NONSPECIFIC ABNORMAL ELECTROCARDIOGRAM (ECG) (EKG): ICD-10-CM

## 2023-09-19 DIAGNOSIS — R79.89 INCREASED PTH LEVEL: ICD-10-CM

## 2023-09-19 DIAGNOSIS — R53.82 CHRONIC FATIGUE: ICD-10-CM

## 2023-09-19 DIAGNOSIS — R06.09 DOE (DYSPNEA ON EXERTION): ICD-10-CM

## 2023-09-19 LAB
CV PHARM DOSE: 0.4 MG
CV STRESS BASE HR: 59 BPM
DIASTOLIC BLOOD PRESSURE: 90 MMHG
EJECTION FRACTION- HIGH: 65 %
END DIASTOLIC INDEX-HIGH: 153 ML/M2
END DIASTOLIC INDEX-LOW: 93 ML/M2
END SYSTOLIC INDEX-HIGH: 71 ML/M2
END SYSTOLIC INDEX-LOW: 31 ML/M2
NUC REST DIASTOLIC VOLUME INDEX: 85
NUC REST EJECTION FRACTION: 73
NUC REST SYSTOLIC VOLUME INDEX: 23
NUC STRESS DIASTOLIC VOLUME INDEX: 87
NUC STRESS EJECTION FRACTION: 82 %
NUC STRESS SYSTOLIC VOLUME INDEX: 16
OHS CV CPX 1 MINUTE RECOVERY HEART RATE: 73 BPM
OHS CV CPX 85 PERCENT MAX PREDICTED HEART RATE MALE: 119
OHS CV CPX MAX PREDICTED HEART RATE: 140
OHS CV CPX PATIENT IS FEMALE: 0
OHS CV CPX PATIENT IS MALE: 1
OHS CV CPX PEAK DIASTOLIC BLOOD PRESSURE: 86 MMHG
OHS CV CPX PEAK HEAR RATE: 73 BPM
OHS CV CPX PEAK RATE PRESSURE PRODUCT: NORMAL
OHS CV CPX PEAK SYSTOLIC BLOOD PRESSURE: 142 MMHG
OHS CV CPX PERCENT MAX PREDICTED HEART RATE ACHIEVED: 52
OHS CV CPX RATE PRESSURE PRODUCT PRESENTING: 8378
RETIRED EF AND QEF - SEE NOTES: 53 %
STRESS ST DEPRESSION: 0.5 MM
SYSTOLIC BLOOD PRESSURE: 142 MMHG

## 2023-09-19 PROCEDURE — 93016 NUCLEAR STRESS - CARDIOLOGY INTERPRETED (CUPID ONLY): ICD-10-PCS | Mod: ,,,

## 2023-09-19 PROCEDURE — 93017 CV STRESS TEST TRACING ONLY: CPT

## 2023-09-19 PROCEDURE — A9502 TC99M TETROFOSMIN: HCPCS

## 2023-09-19 PROCEDURE — 78452 HT MUSCLE IMAGE SPECT MULT: CPT | Mod: 26,,, | Performed by: INTERNAL MEDICINE

## 2023-09-19 PROCEDURE — 78452 NUCLEAR STRESS - CARDIOLOGY INTERPRETED (CUPID ONLY): ICD-10-PCS | Mod: 26,,, | Performed by: INTERNAL MEDICINE

## 2023-09-19 PROCEDURE — 93018 CV STRESS TEST I&R ONLY: CPT | Mod: ,,, | Performed by: INTERNAL MEDICINE

## 2023-09-19 PROCEDURE — 93016 CV STRESS TEST SUPVJ ONLY: CPT | Mod: ,,,

## 2023-09-19 PROCEDURE — 93018 NUCLEAR STRESS - CARDIOLOGY INTERPRETED (CUPID ONLY): ICD-10-PCS | Mod: ,,, | Performed by: INTERNAL MEDICINE

## 2023-09-19 RX ORDER — REGADENOSON 0.08 MG/ML
0.4 INJECTION, SOLUTION INTRAVENOUS ONCE
Status: COMPLETED | OUTPATIENT
Start: 2023-09-19 | End: 2023-09-19

## 2023-09-19 RX ADMIN — REGADENOSON 0.4 MG: 0.08 INJECTION, SOLUTION INTRAVENOUS at 09:09

## 2023-09-29 DIAGNOSIS — E07.9 THYROID DISEASE: ICD-10-CM

## 2023-10-02 RX ORDER — ARIPIPRAZOLE 2 MG/1
2 TABLET ORAL
Qty: 30 TABLET | Refills: 0 | Status: SHIPPED | OUTPATIENT
Start: 2023-10-02 | End: 2023-12-05 | Stop reason: SDUPTHER

## 2023-10-02 RX ORDER — LEVOTHYROXINE SODIUM 100 UG/1
100 TABLET ORAL
Qty: 30 TABLET | Refills: 0 | Status: SHIPPED | OUTPATIENT
Start: 2023-10-02 | End: 2023-10-19

## 2023-10-04 ENCOUNTER — OFFICE VISIT (OUTPATIENT)
Dept: CARDIOLOGY | Facility: CLINIC | Age: 83
End: 2023-10-04
Payer: MEDICARE

## 2023-10-04 VITALS
BODY MASS INDEX: 27.43 KG/M2 | WEIGHT: 207 LBS | SYSTOLIC BLOOD PRESSURE: 132 MMHG | DIASTOLIC BLOOD PRESSURE: 86 MMHG | HEART RATE: 54 BPM | OXYGEN SATURATION: 95 % | HEIGHT: 73 IN

## 2023-10-04 DIAGNOSIS — R00.1 BRADYCARDIA: ICD-10-CM

## 2023-10-04 DIAGNOSIS — E03.9 ACQUIRED HYPOTHYROIDISM: ICD-10-CM

## 2023-10-04 DIAGNOSIS — I45.2 BIFASCICULAR BUNDLE BRANCH BLOCK: ICD-10-CM

## 2023-10-04 DIAGNOSIS — I44.0 FIRST DEGREE AV BLOCK: ICD-10-CM

## 2023-10-04 DIAGNOSIS — E34.9 TESTOSTERONE DEFICIENCY: ICD-10-CM

## 2023-10-04 DIAGNOSIS — R53.83 OTHER FATIGUE: Primary | ICD-10-CM

## 2023-10-04 PROCEDURE — 99214 OFFICE O/P EST MOD 30 MIN: CPT | Mod: PBBFAC,PN | Performed by: NURSE PRACTITIONER

## 2023-10-04 PROCEDURE — 99214 OFFICE O/P EST MOD 30 MIN: CPT | Mod: S$PBB,,, | Performed by: NURSE PRACTITIONER

## 2023-10-04 PROCEDURE — 99999 PR PBB SHADOW E&M-EST. PATIENT-LVL IV: ICD-10-PCS | Mod: PBBFAC,,, | Performed by: NURSE PRACTITIONER

## 2023-10-04 PROCEDURE — 99999 PR PBB SHADOW E&M-EST. PATIENT-LVL IV: CPT | Mod: PBBFAC,,, | Performed by: NURSE PRACTITIONER

## 2023-10-04 PROCEDURE — 99214 PR OFFICE/OUTPT VISIT, EST, LEVL IV, 30-39 MIN: ICD-10-PCS | Mod: S$PBB,,, | Performed by: NURSE PRACTITIONER

## 2023-10-04 NOTE — PROGRESS NOTES
[facility]    Subjective:     Patient ID:  Gilberto Lee is a 82 y.o. male patient here for evaluation Results (Stress test , Cardiac monitor , and Echo )      HPI    Patient is here for a follow up visit. He denies CP. He has an increase in fatigue and tiredness. He denies recent fever or chills. He had a recent stress, cardiac monitor and ECHO. ALL WNL. His monitor did show short runs SVT not lasting long. He had a stress that is negative for RI. LVEF Stable in February. He has been started on abilify and has had work up for sleep apnea, lungs, and Thyroid and Testosterone has been checked. He has even held statin with no benefits. He has dry skin. He feels worse in the morning. As the day goes on he feels better. He sleeps 16 hours per day some days. This is not like him and not his normal depression.       Review of Systems   Constitutional:  Positive for malaise/fatigue.   Respiratory:  Positive for shortness of breath.    Psychiatric/Behavioral:          Treated for depression        Past Medical History:   Diagnosis Date    Arthritis     Atelectasis of left lung 07/2015    Bradycardia 07/17/2015    Calcium blood increased 08/09/2016    Depression     Diastolic dysfunction without heart failure 07/2015    Elevated BUN 08/09/2016    Fatigue 04/29/2015    Hyperglycemia 08/09/2016    Hyperlipidemia     Migraine without aura 10/2015    Prehypertension 11/2016    Testosterone deficiency 04/2015    Thyroid disease        Past Surgical History:   Procedure Laterality Date    ACHILLES TENDON SURGERY      APPENDECTOMY      DUPUYTREN CONTRACTURE RELEASE  2014    LUMBAR EPIDURAL INJECTION  2013    PROSTATE SURGERY      SACROILIAC JOINT INJECTION      TONSILLECTOMY      TRANSURETHRAL RESECTION OF PROSTATE         Family History   Problem Relation Age of Onset    COPD Father        Social History     Socioeconomic History    Marital status:     Occupational History    Occupation: retired   Tobacco Use    Smoking status: Former     Current packs/day: 0.00     Types: Cigarettes     Quit date: 1972     Years since quittin.2    Smokeless tobacco: Never   Substance and Sexual Activity    Alcohol use: Yes     Comment: occassional    Drug use: No    Sexual activity: Yes     Social Determinants of Health     Financial Resource Strain: Low Risk  (2023)    Overall Financial Resource Strain (CARDIA)     Difficulty of Paying Living Expenses: Not hard at all   Food Insecurity: No Food Insecurity (2023)    Hunger Vital Sign     Worried About Running Out of Food in the Last Year: Never true     Ran Out of Food in the Last Year: Never true   Transportation Needs: No Transportation Needs (2023)    PRAPARE - Transportation     Lack of Transportation (Medical): No     Lack of Transportation (Non-Medical): No   Physical Activity: Insufficiently Active (2023)    Exercise Vital Sign     Days of Exercise per Week: 1 day     Minutes of Exercise per Session: 90 min   Stress: No Stress Concern Present (2023)    [place] [employer] - Occupational Stress Questionnaire     Feeling of Stress : Not at all   Social Connections: Moderately Isolated (2023)    Social Connection and Isolation Panel [NHANES]     Frequency of Communication with Friends and Family: Three times a week     Frequency of Social Gatherings with Friends and Family: Once a week     Attends Protestant Services: Never     Active Member of Clubs or Organizations: No     Attends Club or Organization Meetings: Never     Marital Status:    Housing Stability: Low Risk  (2023)    Housing Stability Vital Sign     Unable to Pay for Housing in the Last Year: No     Number of Places Lived in the Last Year: 1     Unstable Housing in the Last Year: No       Current Outpatient Medications   Medication Sig Dispense Refill    ARIPiprazole (ABILIFY) 2 MG Tab Take  1 tablet by mouth once daily 30 tablet 0    buPROPion (WELLBUTRIN SR) 150 MG TBSR 12 hr tablet Take 1 tablet (150 mg total) by mouth 2 (two) times daily. 90 tablet 1    CALCIUM CITRATE/VITAMIN D3 (CITRACAL + D ORAL) Take by mouth. Taking 2 tablets by mouth daily      fluticasone propionate (FLONASE) 50 mcg/actuation nasal spray 1 spray (50 mcg total) by Each Nostril route once daily. 48 mL 1    glucosamine-chondroit-vit C-Mn 500-400 mg capsule Take 1 tablet by mouth 3 (three) times daily. 3 Capsule Oral Every day      levothyroxine (SYNTHROID) 100 MCG tablet Take 1 tablet by mouth once daily 30 tablet 0    loratadine (CLARITIN) 10 mg tablet Take 10 mg by mouth once daily.      losartan (COZAAR) 25 MG tablet Take 1 tablet (25 mg total) by mouth once daily. 90 tablet 3    naproxen sodium (ANAPROX) 220 MG tablet Take 220 mg by mouth 2 (two) times daily with meals. 1 Tablet Oral Twice a day       omega-3 fatty acids 1,000 mg Cap Take by mouth once daily. 1 Capsule Oral Every day      simvastatin (ZOCOR) 20 MG tablet Take 1 tablet (20 mg total) by mouth once daily. 90 tablet 3    sumatriptan (IMITREX) 100 MG tablet Take 1 tablet (100 mg total) by mouth once as needed for Migraine. 27 tablet 0    vit C/E/Zn/coppr/lutein/zeaxan (PRESERVISION AREDS-2 ORAL) Take by mouth once daily.      acetylcarnitine HCl (ACETYL L-CARNITINE ORAL) 3 (three) times daily.       No current facility-administered medications for this visit.       Review of patient's allergies indicates:   Allergen Reactions    Demerol  [meperidine]      Agitation  Other reaction(s): Unknown         Objective:        Vitals:    10/04/23 1036   BP: 132/86   Pulse: (!) 54       Physical Exam  Vitals reviewed.   Constitutional:       Appearance: Normal appearance. He is normal weight.   HENT:      Head: Normocephalic.      Nose: Nose normal.   Cardiovascular:      Rate and Rhythm: Normal rate and regular rhythm.   Pulmonary:      Breath sounds: Normal breath  sounds.   Skin:     General: Skin is warm and dry.      Capillary Refill: Capillary refill takes less than 2 seconds.   Neurological:      Mental Status: He is alert and oriented to person, place, and time.   Psychiatric:         Mood and Affect: Mood normal.         Behavior: Behavior normal.         Thought Content: Thought content normal.         Judgment: Judgment normal.         LIPIDS - LAST 2   Lab Results   Component Value Date    CHOL 171 07/20/2023    CHOL 178 11/11/2021    HDL 59 07/20/2023    HDL 69 11/11/2021    LDLCALC 96.6 07/20/2023    LDLCALC 95.8 11/11/2021    TRIG 77 07/20/2023    TRIG 66 11/11/2021    CHOLHDL 34.5 07/20/2023    CHOLHDL 38.8 11/11/2021       CBC - LAST 2  Lab Results   Component Value Date    WBC 8.03 07/20/2023    WBC 6.26 05/17/2023    RBC 4.54 (L) 07/20/2023    RBC 5.01 05/17/2023    HGB 14.1 07/20/2023    HGB 15.6 05/17/2023    HCT 42.1 07/20/2023    HCT 46.5 05/17/2023    MCV 93 07/20/2023    MCV 93 05/17/2023    MCH 31.1 (H) 07/20/2023    MCH 31.1 (H) 05/17/2023    MCHC 33.5 07/20/2023    MCHC 33.5 05/17/2023    RDW 12.4 07/20/2023    RDW 12.6 05/17/2023     07/20/2023     05/17/2023    MPV 8.7 (L) 07/20/2023    MPV 9.7 05/17/2023    GRAN 5.0 07/20/2023    GRAN 62.8 07/20/2023    LYMPH 1.7 07/20/2023    LYMPH 20.7 07/20/2023    MONO 0.8 07/20/2023    MONO 10.3 07/20/2023    BASO 0.09 07/20/2023    BASO 0.07 05/17/2022    NRBC 0 07/20/2023    NRBC 0 05/17/2022       CHEMISTRY & LIVER FUNCTION - LAST 2  Lab Results   Component Value Date     07/20/2023     11/30/2022    K 4.1 07/20/2023    K 4.1 11/30/2022     07/20/2023     11/30/2022    CO2 26 07/20/2023    CO2 28 11/30/2022    ANIONGAP 10 07/20/2023    ANIONGAP 6 (L) 11/30/2022    BUN 18 07/20/2023    BUN 24 (H) 11/30/2022    CREATININE 1.2 07/20/2023    CREATININE 1.1 11/30/2022     (H) 07/20/2023    GLU 96 11/30/2022    CALCIUM 9.5 07/20/2023    CALCIUM 9.1 11/30/2022    MG 2.1  "05/30/2017    ALBUMIN 3.5 07/20/2023    ALBUMIN 4.3 05/17/2023    PROT 7.4 07/20/2023    PROT 7.3 11/30/2022    ALKPHOS 66 07/20/2023    ALKPHOS 43 (L) 11/30/2022    ALT 17 07/20/2023    ALT 26 11/30/2022    AST 25 07/20/2023    AST 32 11/30/2022    BILITOT 0.3 07/20/2023    BILITOT 0.8 11/30/2022        CARDIAC PROFILE - LAST 2  Lab Results   Component Value Date     (H) 05/01/2020        COAGULATION - LAST 2  No results found for: "LABPT", "INR", "APTT"    ENDOCRINE & PSA - LAST 2  Lab Results   Component Value Date    HGBA1C 5.6 05/26/2017    TSH 4.036 (H) 07/20/2023    TSH 3.740 05/17/2022    PSA 1.75 08/12/2013    PSA 0.92 10/28/2011        ECHOCARDIOGRAM RESULTS  Results for orders placed during the hospital encounter of 02/06/23    Echo    Interpretation Summary  · The left ventricle is normal in size with concentric remodeling and normal systolic function.  · The estimated ejection fraction is 70%.  · Normal left ventricular diastolic function.  · Normal right ventricular size with normal right ventricular systolic function.  · Moderate left atrial enlargement.      CURRENT/PREVIOUS VISIT EKG  Results for orders placed or performed in visit on 08/30/23   IN OFFICE EKG 12-LEAD (to [place])    Collection Time: 08/30/23  4:17 PM    Narrative    Test Reason : R06.09,    Vent. Rate : 056 BPM     Atrial Rate : 056 BPM     P-R Int : 212 ms          QRS Dur : 146 ms      QT Int : 460 ms       P-R-T Axes : 048 -45 -04 degrees     QTc Int : 443 ms    Sinus bradycardia with sinus arrhythmia with 1st degree A-V block  Right bundle branch block  Left anterior fascicular block   Bifascicular block   Abnormal ECG  When compared with ECG of 28-OCT-2014 11:26,  Left anterior fascicular block is now Present  Confirmed by Ammon Calderon MD ([id number]) on 9/21/2023 5:35:38 PM    Referred By: [email or web address]   SELF           Confirmed By:Ammon Calderon MD     No valid procedures specified.   Results for orders placed during the hospital " encounter of 09/19/23    Nuclear Stress - Cardiology Interpreted    Interpretation Summary    Equivocal myocardial perfusion scan.    There is a moderate sized, equivocal perfusion abnormality that is fixed. This finding is equivocal due to soft tissue shadow.  No reversible Focal perfusion defect is stress images take up radioisotope better than the rest images.    There are no other significant perfusion abnormalities.    The gated perfusion images showed an ejection fraction of 73% at rest. The gated perfusion images showed an ejection fraction of 82% post stress. Normal ejection fraction is greater than 53%.    There is normal wall motion at rest and post stress.    LV cavity size is normal at rest and normal at stress.    The ECG portion of the study is abnormal but not diagnostic due to resting ST-T abnormalities.    The patient reported no chest pain during the stress test.    There were no arrhythmias during stress.            Assessment:       1. Other fatigue    2. Bradycardia    3. Acquired hypothyroidism    4. Testosterone deficiency    5. Bifascicular bundle branch block    6. First degree AV block           Plan:         Nothing seen on 7 day monitor such as  blocks or pauses  Short  run of SVT  Nothing more    Refer to Endocrinology  Consider Rheumatologist?        Problem List Items Addressed This Visit          Cardiac/Vascular    Bradycardia    First degree AV block    Bifascicular bundle branch block       Endocrine    Testosterone deficiency    Acquired hypothyroidism       Other    Fatigue - Primary    Current Assessment & Plan     Chronic Fatigue    Many diagnosis have been ruled out  Thyroid, Testosterone, Anemia, Cardiac, Pulmonary, and being treated for depression which is chronic    Abilify started 4 weeks ago  Patient has no quality of life  Only other things that could be looked into is adrenal glands  He feels worse in the morning    Check 8 a cortisol  Check ACTH    Refer to  endocrinology for his thyroid and any further testing.                  Annetta Leal NP  [facility]

## 2023-10-04 NOTE — ASSESSMENT & PLAN NOTE
Chronic Fatigue    Many diagnosis have been ruled out  Thyroid, Testosterone, Anemia, Cardiac, Pulmonary, and being treated for depression which is chronic    Abilify started 4 weeks ago  Patient has no quality of life  Only other things that could be looked into is adrenal glands  He feels worse in the morning    Check 8 a cortisol  Check ACTH    Refer to endocrinology for his thyroid and any further testing.

## 2023-10-06 ENCOUNTER — LAB VISIT (OUTPATIENT)
Dept: LAB | Facility: HOSPITAL | Age: 83
End: 2023-10-06
Attending: NURSE PRACTITIONER
Payer: MEDICARE

## 2023-10-06 ENCOUNTER — PATIENT MESSAGE (OUTPATIENT)
Dept: CARDIOLOGY | Facility: CLINIC | Age: 83
End: 2023-10-06
Payer: MEDICARE

## 2023-10-06 DIAGNOSIS — R53.83 OTHER FATIGUE: ICD-10-CM

## 2023-10-06 LAB
CORTIS SERPL-MCNC: 12.4 UG/DL (ref 4.3–22.4)
TSH SERPL DL<=0.005 MIU/L-ACNC: 1.08 UIU/ML (ref 0.34–5.6)

## 2023-10-06 PROCEDURE — 82533 TOTAL CORTISOL: CPT | Performed by: NURSE PRACTITIONER

## 2023-10-06 PROCEDURE — 84443 ASSAY THYROID STIM HORMONE: CPT | Performed by: NURSE PRACTITIONER

## 2023-10-06 PROCEDURE — 82024 ASSAY OF ACTH: CPT | Performed by: NURSE PRACTITIONER

## 2023-10-08 LAB — ACTH PLAS-MCNC: 29.7 PG/ML (ref 7.2–63.3)

## 2023-10-11 ENCOUNTER — PATIENT MESSAGE (OUTPATIENT)
Dept: CARDIOLOGY | Facility: CLINIC | Age: 83
End: 2023-10-11
Payer: MEDICARE

## 2023-10-13 ENCOUNTER — TELEPHONE (OUTPATIENT)
Dept: CARDIOLOGY | Facility: CLINIC | Age: 83
End: 2023-10-13
Payer: MEDICARE

## 2023-10-13 NOTE — TELEPHONE ENCOUNTER
----- Message from Shantal Clements MA sent at 10/13/2023 12:06 PM CDT -----  Regarding: RE: appointment  Good morning, yes we were able to schedule this patient but we have not been able to speak to patient regarding this scheduled appointment. He is schedule October 19, 2023 @ 11am. If you can contact patient if would help. Thank you    Murieli  ----- Message -----  From: Guillermina Arvizu MA  Sent: 10/12/2023   9:03 AM CDT  To: [facility] Staff  Subject: appointment                                      Hey, we put in a referral for this patient and what just checking to see if you received it.

## 2023-10-14 ENCOUNTER — PATIENT MESSAGE (OUTPATIENT)
Dept: CARDIOLOGY | Facility: CLINIC | Age: 83
End: 2023-10-14
Payer: MEDICARE

## 2023-10-19 ENCOUNTER — OFFICE VISIT (OUTPATIENT)
Dept: ENDOCRINOLOGY | Facility: CLINIC | Age: 83
End: 2023-10-19
Payer: MEDICARE

## 2023-10-19 VITALS
HEART RATE: 61 BPM | WEIGHT: 208.88 LBS | DIASTOLIC BLOOD PRESSURE: 89 MMHG | OXYGEN SATURATION: 96 % | SYSTOLIC BLOOD PRESSURE: 136 MMHG | HEIGHT: 73 IN | BODY MASS INDEX: 27.68 KG/M2

## 2023-10-19 DIAGNOSIS — R53.83 FATIGUE, UNSPECIFIED TYPE: ICD-10-CM

## 2023-10-19 DIAGNOSIS — E55.9 VITAMIN D DEFICIENCY: ICD-10-CM

## 2023-10-19 DIAGNOSIS — G47.33 OBSTRUCTIVE SLEEP APNEA SYNDROME: ICD-10-CM

## 2023-10-19 DIAGNOSIS — R73.01 IMPAIRED FASTING GLUCOSE: ICD-10-CM

## 2023-10-19 DIAGNOSIS — E03.9 ACQUIRED HYPOTHYROIDISM: ICD-10-CM

## 2023-10-19 DIAGNOSIS — E03.9 HYPOTHYROIDISM, UNSPECIFIED TYPE: Primary | ICD-10-CM

## 2023-10-19 DIAGNOSIS — F32.0 MILD MAJOR DEPRESSION: ICD-10-CM

## 2023-10-19 DIAGNOSIS — E21.3 HYPERPARATHYROIDISM, UNSPECIFIED: ICD-10-CM

## 2023-10-19 PROBLEM — R79.89 LOW TESTOSTERONE: Status: RESOLVED | Noted: 2023-05-12 | Resolved: 2023-10-19

## 2023-10-19 PROCEDURE — 99999 PR PBB SHADOW E&M-EST. PATIENT-LVL V: CPT | Mod: PBBFAC,GC,, | Performed by: STUDENT IN AN ORGANIZED HEALTH CARE EDUCATION/TRAINING PROGRAM

## 2023-10-19 PROCEDURE — 99215 OFFICE O/P EST HI 40 MIN: CPT | Mod: PBBFAC | Performed by: STUDENT IN AN ORGANIZED HEALTH CARE EDUCATION/TRAINING PROGRAM

## 2023-10-19 PROCEDURE — 99204 PR OFFICE/OUTPT VISIT, NEW, LEVL IV, 45-59 MIN: ICD-10-PCS | Mod: S$PBB,GC,, | Performed by: STUDENT IN AN ORGANIZED HEALTH CARE EDUCATION/TRAINING PROGRAM

## 2023-10-19 PROCEDURE — 99204 OFFICE O/P NEW MOD 45 MIN: CPT | Mod: S$PBB,GC,, | Performed by: STUDENT IN AN ORGANIZED HEALTH CARE EDUCATION/TRAINING PROGRAM

## 2023-10-19 PROCEDURE — 99999 PR PBB SHADOW E&M-EST. PATIENT-LVL V: ICD-10-PCS | Mod: PBBFAC,GC,, | Performed by: STUDENT IN AN ORGANIZED HEALTH CARE EDUCATION/TRAINING PROGRAM

## 2023-10-19 RX ORDER — LEVOTHYROXINE SODIUM 75 UG/1
75 TABLET ORAL
Qty: 90 TABLET | Refills: 3 | Status: SHIPPED | OUTPATIENT
Start: 2023-10-19 | End: 2023-12-06 | Stop reason: SDUPTHER

## 2023-10-19 RX ORDER — VIT C/E/ZN/COPPR/LUTEIN/ZEAXAN 250MG-90MG
CAPSULE ORAL
COMMUNITY
Start: 2000-01-01

## 2023-10-19 RX ORDER — LIOTHYRONINE SODIUM 5 UG/1
TABLET ORAL
Qty: 90 TABLET | Refills: 3 | Status: SHIPPED | OUTPATIENT
Start: 2023-10-19 | End: 2023-12-06 | Stop reason: SDUPTHER

## 2023-10-19 NOTE — ASSESSMENT & PLAN NOTE
Unlikely that hypothyroidism is primary cause of his fatigue as his TFTs have been normal persistently for a while  Testosterone panel and 8am cortisol normal -- unlikely hypogonadism or AI  Vit D normal 1yr ago, on supplement as part of calcium/vit d combo - will recheck Vit D with labs in 6wks as well as a1c (although very los suspicion for DM; had slightly high fasting BG on chemistry panel recently)  Suspect multifactorial etiology with contributions from MDD as well as untreated ANGELIQUE - will refer to sleep medicine however he can also just get cpap from pcp first and try that if he would prefer that

## 2023-10-19 NOTE — PROGRESS NOTES
Endocrinology New Visit   10/19/2023      Subjective:      Chief Complaint:  Thyroid Nodule      History of Present Illness  Gilberto Lee is a 82 y.o. male with hx hyperparathyroidism s/p parathyroidectomy, BPH, MDD, subclinical hypothyroidism, vit D def referred by Annetta Leal for evaluation of fatigue and hypothyroidism  .  Last 4 yrs has been fatigued, but last 6mo has been debilitating to his life - unable to do things hs loves like painting and fishing, due to physical exhaustion  Severe fatigue, sleeps at least 16 hrs/day. No energy to walk short distances.  Fatigue worse in the morning, with SOB, seems to be less severe later in the day  Had sleep study and has mild sleep apnea - was told tx was optional - he has not tried it but is willing to do so.    2017 had 2 parathyroid glands removed at the [facility] in [place] with normal serum Ca after that  He is on calcium/vit D supplements per instructions from [facility] -   Takes 3 citrcals per day (also contains Vit D).    Extensive workup has been done by PCP and more recently cardiology [facility] who checked pituitary hormones   Had normal 8am cortisol and ACTH  Normal testosterone panel    Started on thyroid replacement in 2018 for mildly elevated TSH with ft4 just below the LLN  Was on LT4 50mcg qd for several years. In the past 6mo dose has been rapidly escalated to 75 mcg then 100 mcg based primarily on symptoms. Only 4wks between dose increase from 50-->75 and then 75-->100, with no TFTs checkd prior to increasing to 100 mcg.  Currently on LT4 100 mcg daily - taking correctly.  No improvement in fatigue with escalating doses of LT4.      Occ constipation  - miralax maybe every 2 wks  +SOB in the morning and sometimes at night when sleeping   Denies palpitations, tremor, weight loss, or diarrhea  No neurologic sx  No joint pain    Has MDD and is being treated for this, new med was added in last few weeks.  Nocturia x3 o/n with  "polydipsia miller at night  - no hx DM      ROS:   As above    Objective:     /89   Pulse 61   Ht 6' 1" (1.854 m)   Wt 94.7 kg (208 lb 14.2 oz)   SpO2 96%   BMI 27.56 kg/m²   BP Readings from Last 3 Encounters:   10/19/23 136/89   10/04/23 132/86   08/30/23 138/80     Wt Readings from Last 1 Encounters:   10/19/23 1107 94.7 kg (208 lb 14.2 oz)     Body mass index is 27.56 kg/m².    Physical Exam  Vitals reviewed.   Constitutional:       General: He is not in acute distress.     Appearance: Normal appearance. He is normal weight. He is not ill-appearing.   HENT:      Mouth/Throat:      Mouth: Mucous membranes are moist.   Eyes:      Extraocular Movements: Extraocular movements intact.      Conjunctiva/sclera: Conjunctivae normal.   Neck:      Thyroid: No thyromegaly.   Cardiovascular:      Rate and Rhythm: Normal rate.   Pulmonary:      Effort: Pulmonary effort is normal.   Musculoskeletal:      Cervical back: Normal range of motion and neck supple.      Right lower leg: No edema.      Left lower leg: No edema.   Neurological:      Mental Status: He is alert and oriented to person, place, and time.   Psychiatric:         Mood and Affect: Mood normal.         Behavior: Behavior normal.       Lab Review:   Lab Results   Component Value Date    HGBA1C 5.6 05/26/2017     Lab Results   Component Value Date    CHOL 171 07/20/2023    HDL 59 07/20/2023    LDLCALC 96.6 07/20/2023    TRIG 77 07/20/2023    CHOLHDL 34.5 07/20/2023     Lab Results   Component Value Date     07/20/2023    K 4.1 07/20/2023     07/20/2023    CO2 26 07/20/2023     (H) 07/20/2023    BUN 18 07/20/2023    CREATININE 1.2 07/20/2023    CALCIUM 9.5 07/20/2023    PROT 7.4 07/20/2023    ALBUMIN 3.5 07/20/2023    BILITOT 0.3 07/20/2023    ALKPHOS 66 07/20/2023    AST 25 07/20/2023    ALT 17 07/20/2023    ANIONGAP 10 07/20/2023    ESTGFRAFRICA >60.0 05/17/2022    EGFRNONAA >60.0 05/17/2022    TSH 1.078 10/06/2023     Vit D, " 25-Hydroxy   Date Value Ref Range Status   02/28/2023 53 30 - 96 ng/mL Final     Comment:     Vitamin D deficiency.........<10 ng/mL                              Vitamin D insufficiency......10-29 ng/mL       Vitamin D sufficiency........> or equal to 30 ng/mL  Vitamin D toxicity............>100 ng/mL           All relevant labs and imaging reviewed.    Assessment and Plan     Fatigue  Unlikely that hypothyroidism is primary cause of his fatigue as his TFTs have been normal persistently for a while  Testosterone panel and 8am cortisol normal -- unlikely hypogonadism or AI  Vit D normal 1yr ago, on supplement as part of calcium/vit d combo - will recheck Vit D with labs in 6wks as well as a1c (although very los suspicion for DM; had slightly high fasting BG on chemistry panel recently)  Suspect multifactorial etiology with contributions from MDD as well as untreated ANGELIQUE - will refer to sleep medicine however he can also just get cpap from pcp first and try that if he would prefer that      Vitamin D deficiency  On Ca.Vit D combo supplement  Will check Vit D level with TSH in 6wks    Mild major depression  Wellbutrin, also on abilify which is a newer med  Managed by pcp      Acquired hypothyroidism  Rapid dose escalation in LT4 by cardiology due to symptoms. Currently on LT4 100 mcg with no improvement in symptoms  This is unlikely the reason for severe fatigue given that he is biochemically euthyroid. Discussed option to add very low dose Cytomel with reduced LT4 dose  Will try Levothyroxine 75 mcg qd and Cytomel 2.5 mcg BID and recheck TSH in 6wks  Caution against increasing dose based on symptoms as over treatment poses many risks, especially in the elderly. Our usual TSH goal in elderly pts is 4-7. Reasonable to keep him in upper half of normal range up to 7 for TSH.    Obstructive sleep apnea syndrome  Pt was told mild ANGELIQUE on sleep study   tx offered as optional - recommended he try this. Refer to sleep  medicine.      Labs 6wks  RTC 1yr      Shiloh Solorzano MD  [facility], [address]th [address]  [address] [place], [place], [address]    Office: [phone]  Fax: [phone]

## 2023-10-19 NOTE — ASSESSMENT & PLAN NOTE
Pt was told mild ANGELIQUE on sleep study   tx offered as optional - recommended he try this. Refer to sleep medicine.

## 2023-10-19 NOTE — PATIENT INSTRUCTIONS
I am referring you to see the sleep medicine specialist to discuss treatment for sleep apnea    We will check vitamin D and a1c.    Add cytomel 2.5 mcg (1/2 tab) twice daily    Decrease levothyroxine 75 mcg once daily    Repeat labs for the thyroid in 6 wks

## 2023-10-19 NOTE — ASSESSMENT & PLAN NOTE
Rapid dose escalation in LT4 by cardiology due to symptoms. Currently on LT4 100 mcg with no improvement in symptoms  This is unlikely the reason for severe fatigue given that he is biochemically euthyroid. Discussed option to add very low dose Cytomel with reduced LT4 dose  Will try Levothyroxine 75 mcg qd and Cytomel 2.5 mcg BID and recheck TSH in 6wks  Caution against increasing dose based on symptoms as over treatment poses many risks, especially in the elderly. Our usual TSH goal in elderly pts is 4-7. Reasonable to keep him in upper half of normal range up to 7 for TSH.

## 2023-11-15 ENCOUNTER — OFFICE VISIT (OUTPATIENT)
Dept: PRIMARY CARE CLINIC | Facility: CLINIC | Age: 83
End: 2023-11-15
Payer: MEDICARE

## 2023-11-15 VITALS
SYSTOLIC BLOOD PRESSURE: 130 MMHG | RESPIRATION RATE: 18 BRPM | HEIGHT: 73 IN | DIASTOLIC BLOOD PRESSURE: 74 MMHG | HEART RATE: 60 BPM | WEIGHT: 210.88 LBS | TEMPERATURE: 98 F | BODY MASS INDEX: 27.95 KG/M2 | OXYGEN SATURATION: 96 %

## 2023-11-15 DIAGNOSIS — E78.2 MIXED HYPERLIPIDEMIA: ICD-10-CM

## 2023-11-15 DIAGNOSIS — F32.0 MILD MAJOR DEPRESSION: ICD-10-CM

## 2023-11-15 DIAGNOSIS — E78.00 HYPERCHOLESTEREMIA: ICD-10-CM

## 2023-11-15 DIAGNOSIS — R53.82 CHRONIC FATIGUE: Primary | ICD-10-CM

## 2023-11-15 DIAGNOSIS — G43.009 MIGRAINE WITHOUT AURA AND RESPONSIVE TO TREATMENT: ICD-10-CM

## 2023-11-15 DIAGNOSIS — E03.9 ACQUIRED HYPOTHYROIDISM: ICD-10-CM

## 2023-11-15 DIAGNOSIS — I10 ESSENTIAL HYPERTENSION: ICD-10-CM

## 2023-11-15 PROCEDURE — 99213 OFFICE O/P EST LOW 20 MIN: CPT | Mod: PBBFAC,PN | Performed by: INTERNAL MEDICINE

## 2023-11-15 PROCEDURE — 99999 PR PBB SHADOW E&M-EST. PATIENT-LVL III: ICD-10-PCS | Mod: PBBFAC,,, | Performed by: INTERNAL MEDICINE

## 2023-11-15 PROCEDURE — 99213 OFFICE O/P EST LOW 20 MIN: CPT | Mod: S$PBB,,, | Performed by: INTERNAL MEDICINE

## 2023-11-15 PROCEDURE — 99999 PR PBB SHADOW E&M-EST. PATIENT-LVL III: CPT | Mod: PBBFAC,,, | Performed by: INTERNAL MEDICINE

## 2023-11-15 PROCEDURE — 99213 PR OFFICE/OUTPT VISIT, EST, LEVL III, 20-29 MIN: ICD-10-PCS | Mod: S$PBB,,, | Performed by: INTERNAL MEDICINE

## 2023-11-15 RX ORDER — SIMVASTATIN 20 MG/1
20 TABLET, FILM COATED ORAL DAILY
Qty: 90 TABLET | Refills: 3 | Status: SHIPPED | OUTPATIENT
Start: 2023-11-15

## 2023-11-15 RX ORDER — LOSARTAN POTASSIUM 25 MG/1
25 TABLET ORAL DAILY
Qty: 90 TABLET | Refills: 3 | Status: SHIPPED | OUTPATIENT
Start: 2023-11-15

## 2023-11-15 NOTE — PROGRESS NOTES
Dictation #1  MRN:[id number]  CSN:[id number] INTERNAL MEDICINE PROGRESS/URGENT CARE NOTE    CHIEF COMPLAINT     Chief Complaint   Patient presents with    Follow-up     3 mo f/u   Discuss new thyroid medication  Discuss sleep study        HPI   Gilberto Lee is a 82 y.o.  male who presents for an urgent/follow up visit today.        Patient is doing much better today. Says after starting the cytomel he is back to fishing and has energy etc       Patient was recently seen to establish care and his main major complaint of which he was very distraught about was his fatigue. Sounded new but review of his file shows this complaint has been addressed in great detail back as far as 2015.    From our first and last meeting a month ago, we recorded:   Things discussed and ruled out today:   Cbc is normal so no anemia, thyroid function is normal. All other labs are good. Vitals are normal and show normal HR.   Things ordered to evaluate.   Sleep study  Free testosterone  Vitamin d, and vitamin b12   Echo  PFT      Long discussion re causes of fatigue which were mostly normal for him.      His wife says he sleeps all the time. He sleeps all night, but she says during the day he sleeps as well. Confusing fatigue as he has normal muscle strength. Goes to the gym 3 times per week and is able to do resistance exercise but says hes too tired and with PICKERING when he goes fishing. Has daydayime somnolence and wakes up not feeling rested. Has never had a sleep study.      Mood is normal. Has  a long history of depression but very well treated with the wellbutrin. His PHQ 9 is 6/27.         Results of the above test are:   ECHO shows normal EF of 70% with normal diastolic function. Otherwise normal function.   Full thyroid eval is normal.   Low free testosterone. Of 3.0. normal levels is 6.6-18 . Previous testosterone tests were normal, but given his age, free was checked.   CMP and other labs came back normal.   He had his sleep test done,  "which showed mild ANGELIQUE. Suggest machine if patient would like      HE CONSULTED with urology and again total testosterone was checked and came back normal so decided against testosterone injections.      Wrote in a few days again complaining of worsening fatigue.   He is scheduled to see cardiology but I doubt that will be fruitful because patient is able to exercise etc though he reported that his exercise tolerance has significantly reduced.      At his last visit, he reports he was more fatigued for the past two weeks but since yesterday has been feeling much better. Says it acutely worsened for two weeks, then just got better as of yesterday. No other associated symptoms besides fatigue and "just not feeling well"  We increased the dose of his thyroid medication and his well butrin fro 75mg to 150mg, and had him return to re evaluate.      So patient today reports that despite me writing for syntrhoid 75mg he self tritrated his medication to 125mg. Note TSH was barely over 4, we just thought wed try to increase from 50mg to get some effect. Note T3 and T4 was wbnl.   Says not taht hes on the 125mg, hes feeling much better. Not sure I can agree to such a huge increase from 50mg to 125mg. So I will try the 100mg. Wife and patient requested to see endocrinology     This degree of fatigue is quite concerning. His fatigue is asosciated with SOB. Again normal lung exam. Normal PFT. Is able to exercise well when not so fatigued. So not suggestive of cardiac disease, however have referred him to cardiology to further evaluate.     Home Medications:  Prior to Admission medications    Medication Sig Start Date End Date Taking? Authorizing Provider   acetylcarnitine HCl (ACETYL L-CARNITINE ORAL) 3 (three) times daily. 1/1/21   Provider, Historical   ARIPiprazole (ABILIFY) 2 MG Tab Take 1 tablet by mouth once daily 10/2/23   Trevor Tovar MD   buPROPion (WELLBUTRIN SR) 150 MG TBSR 12 hr tablet Take 1 tablet (150 mg " "total) by mouth 2 (two) times daily. 8/18/23 8/17/24  Mercedes Clemens MD   CALCIUM CITRATE/VITAMIN D3 (CITRACAL + D ORAL) Take by mouth. Taking 2 tablets by mouth daily    Provider, Historical   fluticasone propionate (FLONASE) 50 mcg/actuation nasal spray 1 spray (50 mcg total) by Each Nostril route once daily. 10/26/22   Baltazar Greer MD   glucosamine-chondroit-vit C-Mn 500-400 mg capsule Take 1 tablet by mouth 3 (three) times daily. 3 Capsule Oral Every day    Provider, Historical   levothyroxine (SYNTHROID) 75 MCG tablet Take 1 tablet (75 mcg total) by mouth before breakfast. 10/19/23   Shiloh Solorzano MD   liothyronine (CYTOMEL) 5 MCG Tab 2.5 mcg (1/2 tab) in the morning and 2.5 mcg (1/2 tab) in the early afternoon (2-3 PM) 10/19/23   Shiloh Solorzano MD   loratadine (CLARITIN) 10 mg tablet Take 10 mg by mouth once daily.    Provider, Historical   losartan (COZAAR) 25 MG tablet Take 1 tablet (25 mg total) by mouth once daily. 10/26/22   Baltazar Greer MD   naproxen sodium (ANAPROX) 220 MG tablet Take 220 mg by mouth 2 (two) times daily with meals. 1 Tablet Oral Twice a day     Provider, Historical   omega-3 fatty acids 1,000 mg Cap Take by mouth once daily. 1 Capsule Oral Every day    Provider, Historical   simvastatin (ZOCOR) 20 MG tablet Take 1 tablet (20 mg total) by mouth once daily. 10/26/22   Baltazar Greer MD   sumatriptan (IMITREX) 100 MG tablet Take 1 tablet (100 mg total) by mouth once as needed for Migraine. 1/12/23 10/19/23  Baltazar Greer MD   vit C,Z-Tg-eulzr-lutein-zeaxan (PRESERVISION AREDS-2) 250-90-40-1 mg Cap  1/1/00   Provider, Historical   vit C/E/Zn/coppr/lutein/zeaxan (PRESERVISION AREDS-2 ORAL) Take by mouth once daily.    Provider, Historical       Review of Systems:  Review of Systems      PHYSICAL EXAM     /74 (BP Location: Right arm, Patient Position: Sitting, BP Method: Medium (Manual))   Pulse 60   Temp 97.6 °F (36.4 °C) (Oral)   Resp 18   Ht 6' 1" " (1.854 m)   Wt 95.7 kg (210 lb 13.9 oz)   SpO2 96%   BMI 27.82 kg/m²     GEN - A+OX4, NAD   HEENT - PERRL, EOMI, OP clear  Neck - No thyromegaly or cervical LAD. No thyroid masses felt.  CV - RRR, no m/r   Chest - CTAB, no wheezing or rhonchi  Abd - S/NT/ND/+BS.   Ext - 2+BDP and radial pulses. No C/C/E.  Skin - No rash.    LABS       ASSESSMENT/PLAN     Gilberto Lee is a 83 y.o. male with  1. Chronic fatigue  Impproved with the addition of cytomel     2. Acquired hypothyroidism  On syntrhoid and cytomel with improvement in symptoms    3. Essential hypertension  Very well controlled   -     losartan (COZAAR) 25 MG tablet; Take 1 tablet (25 mg total) by mouth once daily.  Dispense: 90 tablet; Refill: 3    4. Mild major depression  Overview:  PHQ 9 6/27  On wellbutrin and well treated. Continue  Denies SI/HI       5. Migraine without aura and responsive to treatment  Overview:  > 50 years, on CCB.10/2015: d/c CCB since 7/2015  2/2 fatigue.2/2016:  on sumatriptan PRN.  consider add beta blocker for prophylaxis. see cardiology first.5/2016: on claritin. improved. advise  pt to see ENT if worsening. can not tolerate betablocker.on imitrex and feeling better.   Says headaches have gotten much better with age.       6. Mixed hyperlipidemia  Overview:  Lipid panel at goal. Continue simvastatin.       7. Hypercholesteremia  -     simvastatin (ZOCOR) 20 MG tablet; Take 1 tablet (20 mg total) by mouth once daily.  Dispense: 90 tablet; Refill: 3           WORRY SCORE 2    RTC in 6 months, sooner if needed and depending on labs.    Mercedes Clemens MD  Board Certified Internist/Geriatrician  [facility] ([place])

## 2023-11-30 ENCOUNTER — LAB VISIT (OUTPATIENT)
Dept: LAB | Facility: HOSPITAL | Age: 83
End: 2023-11-30
Attending: STUDENT IN AN ORGANIZED HEALTH CARE EDUCATION/TRAINING PROGRAM
Payer: MEDICARE

## 2023-11-30 DIAGNOSIS — R73.01 IMPAIRED FASTING GLUCOSE: ICD-10-CM

## 2023-11-30 DIAGNOSIS — E03.9 HYPOTHYROIDISM, UNSPECIFIED TYPE: ICD-10-CM

## 2023-11-30 DIAGNOSIS — R53.83 FATIGUE, UNSPECIFIED TYPE: ICD-10-CM

## 2023-11-30 DIAGNOSIS — E21.3 HYPERPARATHYROIDISM, UNSPECIFIED: ICD-10-CM

## 2023-11-30 LAB — 25(OH)D3+25(OH)D2 SERPL-MCNC: 72 NG/ML (ref 30–96)

## 2023-11-30 PROCEDURE — 83036 HEMOGLOBIN GLYCOSYLATED A1C: CPT | Performed by: STUDENT IN AN ORGANIZED HEALTH CARE EDUCATION/TRAINING PROGRAM

## 2023-11-30 PROCEDURE — 84443 ASSAY THYROID STIM HORMONE: CPT | Performed by: STUDENT IN AN ORGANIZED HEALTH CARE EDUCATION/TRAINING PROGRAM

## 2023-11-30 PROCEDURE — 36415 COLL VENOUS BLD VENIPUNCTURE: CPT | Mod: PO | Performed by: STUDENT IN AN ORGANIZED HEALTH CARE EDUCATION/TRAINING PROGRAM

## 2023-11-30 PROCEDURE — 82306 VITAMIN D 25 HYDROXY: CPT | Performed by: STUDENT IN AN ORGANIZED HEALTH CARE EDUCATION/TRAINING PROGRAM

## 2023-12-01 ENCOUNTER — PATIENT MESSAGE (OUTPATIENT)
Dept: ENDOCRINOLOGY | Facility: CLINIC | Age: 83
End: 2023-12-01
Payer: MEDICARE

## 2023-12-01 LAB
ESTIMATED AVG GLUCOSE: 114 MG/DL (ref 68–131)
HBA1C MFR BLD: 5.6 % (ref 4–5.6)
TSH SERPL DL<=0.005 MIU/L-ACNC: 1.3 UIU/ML (ref 0.4–4)

## 2023-12-02 ENCOUNTER — PATIENT MESSAGE (OUTPATIENT)
Dept: ENDOCRINOLOGY | Facility: CLINIC | Age: 83
End: 2023-12-02
Payer: MEDICARE

## 2023-12-05 RX ORDER — ARIPIPRAZOLE 2 MG/1
2 TABLET ORAL DAILY
Qty: 90 TABLET | Refills: 0 | Status: SHIPPED | OUTPATIENT
Start: 2023-12-05 | End: 2024-03-25 | Stop reason: SDUPTHER

## 2023-12-06 DIAGNOSIS — E03.9 HYPOTHYROIDISM, UNSPECIFIED TYPE: ICD-10-CM

## 2023-12-06 RX ORDER — LIOTHYRONINE SODIUM 5 UG/1
TABLET ORAL
Qty: 100 TABLET | Refills: 3 | Status: SHIPPED | OUTPATIENT
Start: 2023-12-06 | End: 2024-01-25 | Stop reason: SDUPTHER

## 2023-12-06 RX ORDER — LEVOTHYROXINE SODIUM 75 UG/1
TABLET ORAL
Qty: 90 TABLET | Refills: 3 | Status: SHIPPED | OUTPATIENT
Start: 2023-12-06 | End: 2024-01-25 | Stop reason: SDUPTHER

## 2024-01-17 ENCOUNTER — LAB VISIT (OUTPATIENT)
Dept: LAB | Facility: HOSPITAL | Age: 84
End: 2024-01-17
Attending: STUDENT IN AN ORGANIZED HEALTH CARE EDUCATION/TRAINING PROGRAM
Payer: MEDICARE

## 2024-01-17 DIAGNOSIS — E03.9 HYPOTHYROIDISM, UNSPECIFIED TYPE: ICD-10-CM

## 2024-01-17 LAB — TSH SERPL DL<=0.005 MIU/L-ACNC: 2.12 UIU/ML (ref 0.4–4)

## 2024-01-17 PROCEDURE — 36415 COLL VENOUS BLD VENIPUNCTURE: CPT | Mod: PO | Performed by: STUDENT IN AN ORGANIZED HEALTH CARE EDUCATION/TRAINING PROGRAM

## 2024-01-17 PROCEDURE — 84443 ASSAY THYROID STIM HORMONE: CPT | Performed by: STUDENT IN AN ORGANIZED HEALTH CARE EDUCATION/TRAINING PROGRAM

## 2024-01-21 ENCOUNTER — PATIENT MESSAGE (OUTPATIENT)
Dept: ENDOCRINOLOGY | Facility: CLINIC | Age: 84
End: 2024-01-21
Payer: MEDICARE

## 2024-01-25 DIAGNOSIS — E03.9 HYPOTHYROIDISM, UNSPECIFIED TYPE: ICD-10-CM

## 2024-01-25 RX ORDER — LIOTHYRONINE SODIUM 5 UG/1
5 TABLET ORAL 2 TIMES DAILY
Qty: 180 TABLET | Refills: 3 | Status: SHIPPED | OUTPATIENT
Start: 2024-01-25 | End: 2024-05-07

## 2024-01-25 RX ORDER — LEVOTHYROXINE SODIUM 75 UG/1
TABLET ORAL
Qty: 90 TABLET | Refills: 3 | Status: SHIPPED | OUTPATIENT
Start: 2024-01-25

## 2024-02-15 ENCOUNTER — PATIENT MESSAGE (OUTPATIENT)
Dept: PRIMARY CARE CLINIC | Facility: CLINIC | Age: 84
End: 2024-02-15
Payer: MEDICARE

## 2024-02-16 RX ORDER — BUPROPION HYDROCHLORIDE 75 MG/1
75 TABLET ORAL
COMMUNITY
Start: 2024-02-05 | End: 2024-02-16 | Stop reason: SDUPTHER

## 2024-02-16 RX ORDER — BUPROPION HYDROCHLORIDE 75 MG/1
75 TABLET ORAL
Qty: 180 TABLET | Refills: 1 | Status: SHIPPED | OUTPATIENT
Start: 2024-02-16 | End: 2024-04-04 | Stop reason: SDUPTHER

## 2024-02-16 NOTE — TELEPHONE ENCOUNTER
PLEASE SEE PT PORTAL MESSAGE BELOW        About a month ago, I ran out of my backlog of Bupropion 75 mg and I started taking the 150 mg extended release pills.   Within two weeks I became severely depressed.   I still had an active script for the 75 mg tablets - so I filled it and started on the 75 mg again.  The depression lifted in a few days.     This happened to me once before, more than 10 years ago.    It may be coincidence, but I'd like not to take the chance.    Can you write me a new script?  There's one active, but it's for the wrong amount - 3 pills daily.  The correct dosage is 75 mg 6 tablets daily.   I've been on that amount for 20 years or more.   I take 2 pills morning, noon, and night.  Thank you very much.  Gilberto CARIAS I also need a regular check-up scheduled.        I PENDED 6 DAILY BELOW, IN CASE TEMPORARY SUPPLY IS APPROPRIATE, HOWEVER FOR IS THE RECOMMENDED AMOUNT, 6 EXCEEDS RECOMMENDED DOSE     LOV 11/2023  NOV 04/2024

## 2024-02-16 NOTE — TELEPHONE ENCOUNTER
Patient was informed that his medication had been refilled and that he has a follow up appointment scheduled for 4/9/24. He verbalized understanding.

## 2024-03-07 ENCOUNTER — LAB VISIT (OUTPATIENT)
Dept: LAB | Facility: HOSPITAL | Age: 84
End: 2024-03-07
Attending: STUDENT IN AN ORGANIZED HEALTH CARE EDUCATION/TRAINING PROGRAM
Payer: MEDICARE

## 2024-03-07 DIAGNOSIS — E03.9 HYPOTHYROIDISM, UNSPECIFIED TYPE: ICD-10-CM

## 2024-03-07 LAB — TSH SERPL DL<=0.005 MIU/L-ACNC: 0.73 UIU/ML (ref 0.4–4)

## 2024-03-07 PROCEDURE — 84443 ASSAY THYROID STIM HORMONE: CPT | Performed by: STUDENT IN AN ORGANIZED HEALTH CARE EDUCATION/TRAINING PROGRAM

## 2024-03-07 PROCEDURE — 36415 COLL VENOUS BLD VENIPUNCTURE: CPT | Mod: PO | Performed by: STUDENT IN AN ORGANIZED HEALTH CARE EDUCATION/TRAINING PROGRAM

## 2024-03-25 DIAGNOSIS — R51.9 GENERALIZED HEADACHES: ICD-10-CM

## 2024-03-25 RX ORDER — SUMATRIPTAN SUCCINATE 100 MG/1
100 TABLET ORAL ONCE AS NEEDED
Qty: 27 TABLET | Refills: 0 | Status: CANCELLED | OUTPATIENT
Start: 2024-03-25 | End: 2024-03-25

## 2024-03-26 RX ORDER — ARIPIPRAZOLE 2 MG/1
2 TABLET ORAL DAILY
Qty: 90 TABLET | Refills: 1 | Status: SHIPPED | OUTPATIENT
Start: 2024-03-26

## 2024-03-30 ENCOUNTER — PATIENT MESSAGE (OUTPATIENT)
Dept: PRIMARY CARE CLINIC | Facility: CLINIC | Age: 84
End: 2024-03-30
Payer: MEDICARE

## 2024-03-30 DIAGNOSIS — R51.9 GENERALIZED HEADACHES: ICD-10-CM

## 2024-04-01 NOTE — TELEPHONE ENCOUNTER
LOV 11/15/2023    NOV 4/30/2024   PT SAID PREVIOUS REQUEST WAS DIAGNOSED BUT IM NOT SEEING THAT SO RE REQUESTING ON PT BEHALF

## 2024-04-04 ENCOUNTER — TELEPHONE (OUTPATIENT)
Dept: PRIMARY CARE CLINIC | Facility: CLINIC | Age: 84
End: 2024-04-04
Payer: MEDICARE

## 2024-04-04 RX ORDER — BUPROPION HYDROCHLORIDE 75 MG/1
75 TABLET ORAL
Qty: 540 TABLET | Refills: 1 | Status: SHIPPED | OUTPATIENT
Start: 2024-04-04

## 2024-04-04 RX ORDER — SUMATRIPTAN SUCCINATE 100 MG/1
100 TABLET ORAL ONCE AS NEEDED
Qty: 30 TABLET | Refills: 3 | Status: SHIPPED | OUTPATIENT
Start: 2024-04-04 | End: 2024-04-04

## 2024-04-04 NOTE — TELEPHONE ENCOUNTER
Patient's 4/30/24 appointment moved to 5/3/24. Patient asked about his buproprion and imitrex prescriptions. Cinthia Brennan MA was informed of the medication questions and assumed responsibility for the medications.

## 2024-04-04 NOTE — TELEPHONE ENCOUNTER
Can he please send me a pic of his old prescription. This instruction for wellbutrin is very odd. 2 tabs three times a day seems excessive and an odd way to take it and I dont recall this being the old prescription.

## 2024-04-04 NOTE — TELEPHONE ENCOUNTER
Yes, he is asking for the medication, I don't recall seeing you prescribed, he sent a message asking for a refill and states it was denied originally, which I don't see the refusal but I just processed new request on his behalf.      I responded back to the pt instead of you my apologies pt is still asking for the rx and also requesting a 90 day supply for the the wellbutrin I change but it still flags because he takes pills 6 xs a day

## 2024-04-04 NOTE — TELEPHONE ENCOUNTER
----- Message from Abbie Christopher sent at 4/4/2024  9:36 AM CDT -----  Contact: Self/ [phone] or  [phone]  Patient is returning a phone call.  Who left a message for the patient: Cinthia  Does patient know what this is regarding:  yes  Would you like a call back, or a response through your [facility] portal?:   scot back   Comments:

## 2024-04-23 ENCOUNTER — PATIENT MESSAGE (OUTPATIENT)
Dept: ENDOCRINOLOGY | Facility: CLINIC | Age: 84
End: 2024-04-23
Payer: MEDICARE

## 2024-04-24 DIAGNOSIS — E03.9 HYPOTHYROIDISM, UNSPECIFIED TYPE: Primary | ICD-10-CM

## 2024-04-26 ENCOUNTER — LAB VISIT (OUTPATIENT)
Dept: LAB | Facility: HOSPITAL | Age: 84
End: 2024-04-26
Attending: STUDENT IN AN ORGANIZED HEALTH CARE EDUCATION/TRAINING PROGRAM
Payer: MEDICARE

## 2024-04-26 DIAGNOSIS — E03.9 HYPOTHYROIDISM, UNSPECIFIED TYPE: ICD-10-CM

## 2024-04-26 LAB — TSH SERPL DL<=0.005 MIU/L-ACNC: 1.29 UIU/ML (ref 0.34–5.6)

## 2024-04-26 PROCEDURE — 84443 ASSAY THYROID STIM HORMONE: CPT | Performed by: STUDENT IN AN ORGANIZED HEALTH CARE EDUCATION/TRAINING PROGRAM

## 2024-04-26 PROCEDURE — 36415 COLL VENOUS BLD VENIPUNCTURE: CPT | Performed by: STUDENT IN AN ORGANIZED HEALTH CARE EDUCATION/TRAINING PROGRAM

## 2024-05-03 ENCOUNTER — OFFICE VISIT (OUTPATIENT)
Dept: PRIMARY CARE CLINIC | Facility: CLINIC | Age: 84
End: 2024-05-03
Payer: MEDICARE

## 2024-05-03 VITALS
TEMPERATURE: 98 F | HEIGHT: 73 IN | BODY MASS INDEX: 27.51 KG/M2 | HEART RATE: 54 BPM | OXYGEN SATURATION: 98 % | SYSTOLIC BLOOD PRESSURE: 138 MMHG | DIASTOLIC BLOOD PRESSURE: 88 MMHG | WEIGHT: 207.56 LBS

## 2024-05-03 DIAGNOSIS — R40.0 SOMNOLENCE, DAYTIME: ICD-10-CM

## 2024-05-03 DIAGNOSIS — R51.9 GENERALIZED HEADACHES: ICD-10-CM

## 2024-05-03 DIAGNOSIS — R53.82 CHRONIC FATIGUE: Primary | ICD-10-CM

## 2024-05-03 DIAGNOSIS — E03.9 ACQUIRED HYPOTHYROIDISM: ICD-10-CM

## 2024-05-03 DIAGNOSIS — I51.89 DIASTOLIC DYSFUNCTION WITHOUT HEART FAILURE: ICD-10-CM

## 2024-05-03 DIAGNOSIS — E78.2 MIXED HYPERLIPIDEMIA: ICD-10-CM

## 2024-05-03 DIAGNOSIS — M47.817 LUMBOSACRAL SPONDYLOSIS WITHOUT MYELOPATHY: ICD-10-CM

## 2024-05-03 DIAGNOSIS — R00.1 BRADYCARDIA: ICD-10-CM

## 2024-05-03 DIAGNOSIS — R53.83 FATIGUE, UNSPECIFIED TYPE: ICD-10-CM

## 2024-05-03 DIAGNOSIS — F32.0 MILD MAJOR DEPRESSION: ICD-10-CM

## 2024-05-03 DIAGNOSIS — I10 ESSENTIAL HYPERTENSION: ICD-10-CM

## 2024-05-03 DIAGNOSIS — N40.0 BPH WITHOUT OBSTRUCTION/LOWER URINARY TRACT SYMPTOMS: ICD-10-CM

## 2024-05-03 DIAGNOSIS — E21.3 HYPERPARATHYROIDISM, UNSPECIFIED: ICD-10-CM

## 2024-05-03 PROCEDURE — 93005 ELECTROCARDIOGRAM TRACING: CPT | Mod: PBBFAC,PN | Performed by: INTERNAL MEDICINE

## 2024-05-03 PROCEDURE — 99999 PR PBB SHADOW E&M-EST. PATIENT-LVL IV: CPT | Mod: PBBFAC,,, | Performed by: INTERNAL MEDICINE

## 2024-05-03 PROCEDURE — 99214 OFFICE O/P EST MOD 30 MIN: CPT | Mod: PBBFAC,PN | Performed by: INTERNAL MEDICINE

## 2024-05-03 PROCEDURE — 99212 OFFICE O/P EST SF 10 MIN: CPT | Mod: S$PBB,,, | Performed by: INTERNAL MEDICINE

## 2024-05-03 PROCEDURE — 93010 ELECTROCARDIOGRAM REPORT: CPT | Mod: S$PBB,,, | Performed by: INTERNAL MEDICINE

## 2024-05-03 NOTE — PROGRESS NOTES
INTERNAL MEDICINE PROGRESS/URGENT CARE NOTE    CHIEF COMPLAINT     Chief Complaint   Patient presents with    Follow-up     Patient is here for a 6 month follow up.     Fatigue     Patient c/o fatigue for the past few years. He states he was doing well with the Cytomel, but that his fatigue returned a couple of weeks ago.        HPI     Gilberto Lee is a 82 y.o.  male who presents with a PMHx of  MDD controlled, dCHF, migraine headaches, HLD, who presents today for routine follow up visit.        Patient reported at his last visit that he was doing much at his last visit. Says after starting the cytomel he is back to fishing and has energy etc   Today, he says for the past two weeks, he's been fatigued again. Nothing has changed in regards to activity levels. States his mood is good. No other explanation for his fatigue        fatigue history:   Patient was recently seen to [facility] care and his main major complaint of which he was very distraught about was his fatigue. Sounded new but review of his file shows this complaint has been addressed in great detail back as far as 2015.    From our first and last meeting a month ago, we recorded:   Things discussed and ruled out today:   Cbc is normal so no anemia, thyroid function is normal. All other labs are good. Vitals are normal and show normal HR.   Things ordered to evaluate.   Sleep study  Free testosterone  Vitamin d, and vitamin b12   Echo  PFT   Long discussion re causes of fatigue which were mostly normal for him.   His wife says he sleeps all the time. He sleeps all night, but she says during the day he sleeps as well. Confusing fatigue as he has normal muscle strength. Goes to the gym 3 times per week and is able to do resistance exercise but says hes too tired and with PICKERING when he goes fishing. Has daydayime somnolence and wakes up not feeling rested. Has never had a sleep study.      Mood is normal. Has  a long history of depression but very well  "treated with the wellbutrin. His PHQ 9 is 6/27.         Results of the above test are:   ECHO shows normal EF of 70% with normal diastolic function. Otherwise normal function.   Full thyroid eval is normal.   Low free testosterone. Of 3.0. normal levels is 6.6-18 . Previous testosterone tests were normal, but given his age, free was checked.   CMP and other labs came back normal.   He had his sleep test done, which showed mild ANGELIQUE. Suggest machine if patient would like      HE CONSULTED with urology and again total testosterone was checked and came back normal so decided against testosterone injections.      Wrote in a few days again complaining of worsening fatigue.   He is scheduled to see cardiology but I doubt that will be fruitful because patient is able to exercise etc though he reported that his exercise tolerance has significantly reduced.      At his last visit, he reports he was more fatigued for the past two weeks but since yesterday has been feeling much better. Says it acutely worsened for two weeks, then just got better as of yesterday. No other associated symptoms besides fatigue and "just not feeling well"  We increased the dose of his thyroid medication and his well butrin fro 75mg to 150mg, and had him return to re evaluate.      So patient today reports that despite me writing for syntrhoid 75mg he self tritrated his medication to 125mg. Note TSH was barely over 4, we just thought wed try to increase from 50mg to get some effect. Note T3 and T4 was wbnl.   Says not taht hes on the 125mg, hes feeling much better. Not sure I can agree to such a huge increase from 50mg to 125mg. So I will try the 100mg. Wife and patient requested to see endocrinology     This degree of fatigue is quite concerning. His fatigue is asosciated with SOB. Again normal lung exam. Normal PFT. Is able to exercise well when not so fatigued. So not suggestive of cardiac disease, however have referred him to cardiology to " further evaluate.       Home Medications:  Prior to Admission medications    Medication Sig Start Date End Date Taking? Authorizing Provider   acetylcarnitine HCl (ACETYL L-CARNITINE ORAL) 3 (three) times daily. 1/1/21   Provider, Historical   ARIPiprazole (ABILIFY) 2 MG Tab Take 1 tablet (2 mg total) by mouth once daily. 3/26/24   Mercedes Clemens MD   buPROPion (WELLBUTRIN SR) 150 MG TBSR 12 hr tablet Take 1 tablet (150 mg total) by mouth 2 (two) times daily. 8/18/23 8/17/24  Mercedes Clemens MD   buPROPion (WELLBUTRIN) 75 MG tablet Take 1 tablet (75 mg total) by mouth 6 (six) times daily. TWO TABLETS IN THE MORNING 2 TABLETS IN THE AFTERNOON TWO TABLETS BY MOUTH IN THE EVENING 4/4/24   Mercedes Clemens MD   CALCIUM CITRATE/VITAMIN D3 (CITRACAL + D ORAL) Take by mouth. Taking 2 tablets by mouth daily    Provider, Historical   fluticasone propionate (FLONASE) 50 mcg/actuation nasal spray 1 spray (50 mcg total) by Each Nostril route once daily. 10/26/22   Baltazar Greer MD   glucosamine-chondroit-vit C-Mn 500-400 mg capsule Take 1 tablet by mouth 3 (three) times daily. 3 Capsule Oral Every day    Provider, Historical   levothyroxine (SYNTHROID) 75 MCG tablet Take 75 mcg (1 tab) by mouth daily 6 days per week, and skip one day each week. 1/25/24   Shiloh Solorzano MD   liothyronine (CYTOMEL) 5 MCG Tab Take 1 tablet (5 mcg total) by mouth 2 (two) times a day. 2.5 mcg (1/2 tab) in the morning and 5 mcg (1 tab) in the early afternoon (2-3 PM) 1/25/24   Shiloh Solorzano MD   loratadine (CLARITIN) 10 mg tablet Take 10 mg by mouth once daily.    Provider, Historical   losartan (COZAAR) 25 MG tablet Take 1 tablet (25 mg total) by mouth once daily. 11/15/23   Mercedes Clemens MD   naproxen sodium (ANAPROX) 220 MG tablet Take 220 mg by mouth 2 (two) times daily with meals. 1 Tablet Oral Twice a day     Provider, Historical   omega-3 fatty acids 1,000 mg Cap Take by mouth once daily. 1 Capsule Oral  "Every day    Provider, Historical   simvastatin (ZOCOR) 20 MG tablet Take 1 tablet (20 mg total) by mouth once daily. 11/15/23   Mercedes Clemens MD   sumatriptan (IMITREX) 100 MG tablet Take 1 tablet (100 mg total) by mouth once as needed for Migraine. 4/4/24 4/4/24  Mercedes Clemens MD   vit C,V-Af-ofnvs-lutein-zeaxan (PRESERVISION AREDS-2) 250-90-40-1 mg Cap  1/1/00   Provider, Historical   vit C/E/Zn/coppr/lutein/zeaxan (PRESERVISION AREDS-2 ORAL) Take by mouth once daily.    Provider, Historical       Review of Systems:  Review of Systems      PHYSICAL EXAM     /88   Pulse (!) 54   Temp 97.5 °F (36.4 °C) (Oral)   Ht 6' 0.75" (1.848 m)   Wt 94.2 kg (207 lb 9 oz)   SpO2 98%   BMI 27.57 kg/m²     GEN - A+OX4, NAD   HEENT - PERRL, EOMI, OP clear  Neck - No thyromegaly or cervical LAD. No thyroid masses felt.  CV - RRR, no m/r   Chest - CTAB, no wheezing or rhonchi  Abd - S/NT/ND/+BS.   Ext - 2+BDP and radial pulses. No C/C/E.  Skin - No rash.    LABS         ASSESSMENT/PLAN     Gilberto Lee is a 83 y.o. male with  1. Chronic fatigue  Unclear cause. Had improved with cytomel but for the past two weeks has been fatigued again.   Would like us to repeat his thyroid and parathyroid labs    2. Hyperparathyroidism, unspecified  Overview:   in 5/26/2017.  ca 10.8.   his of calcium high.  ca 10.7 in 8/2016, 11/2016 wnl . refer to endo, s/p parathyroidectomy. Now labs are normal.   Asymptomatic     Orders:  -     PTH, intact; Future; Expected date: 05/03/2024    3. Mild major depression  Overview:  PHQ 9 3/27  On wellbutrin and well treated. Continue  Denies SI/HI       4. Essential hypertension  BP is a bit elevated. On recheck is wnl.   Tried to refer to digital medicine but david wouldn't allow it to be signed ie the option did not show up.   Instructed to monitor BP at home     5. Mixed hyperlipidemia  Overview:  Lipid panel at goal. Continue simvastatin.     Orders:  -     Lipid Panel; " Future; Expected date: 05/03/2024    6. Somnolence, daytime  Unclear cause. See sleep study     7. Lumbosacral spondylosis without myelopathy  Stable and doing well     8. Generalized headaches  Overview:  Migraines. Alleviated with imitrex       9. Diastolic dysfunction without heart failure  Overview:  grade I, per TTE in 7/2015.  referral to cardiology.saw dr. Zamora, who told him not to come back that hes fine. That was 8 years ago and now with his PICKERING, would like to repeat ECHO.       10. BPH without obstruction/lower urinary tract symptoms - s/p turp - last psa in june of 2016 1.0  Overview:  On flomax with good results      11. Acquired hypothyroidism  Tsh wnl  On cytomel which helped his fatigue symptoms     12. Fatigue, unspecified type  -     CBC Auto Differential; Future; Expected date: 05/03/2024  -     Comprehensive Metabolic Panel; Future; Expected date: 05/03/2024  -     TSH; Future; Expected date: 05/03/2024    13. Bradycardia  -     IN OFFICE EKG 12-LEAD (to [facility])  On manual check went from 38 to 54   To follow up with cardiology            WORRY SCORE 2    RTC in 3 months, sooner if needed and depending on labs.    Mercedes Clemens MD  Board Certified Internist/Geriatrician  [facility] ([place])

## 2024-05-06 ENCOUNTER — TELEPHONE (OUTPATIENT)
Dept: PRIMARY CARE CLINIC | Facility: CLINIC | Age: 84
End: 2024-05-06
Payer: MEDICARE

## 2024-05-06 ENCOUNTER — LAB VISIT (OUTPATIENT)
Dept: LAB | Facility: HOSPITAL | Age: 84
End: 2024-05-06
Attending: INTERNAL MEDICINE
Payer: MEDICARE

## 2024-05-06 DIAGNOSIS — E21.3 HYPERPARATHYROIDISM, UNSPECIFIED: ICD-10-CM

## 2024-05-06 DIAGNOSIS — E78.2 MIXED HYPERLIPIDEMIA: ICD-10-CM

## 2024-05-06 DIAGNOSIS — R53.83 FATIGUE, UNSPECIFIED TYPE: ICD-10-CM

## 2024-05-06 LAB
ALBUMIN SERPL BCP-MCNC: 4.1 G/DL (ref 3.5–5.2)
ALP SERPL-CCNC: 56 U/L (ref 55–135)
ALT SERPL W/O P-5'-P-CCNC: 20 U/L (ref 10–44)
ANION GAP SERPL CALC-SCNC: 4 MMOL/L (ref 8–16)
AST SERPL-CCNC: 28 U/L (ref 10–40)
BASOPHILS # BLD AUTO: 0.07 K/UL (ref 0–0.2)
BASOPHILS NFR BLD: 1.2 % (ref 0–1.9)
BILIRUB SERPL-MCNC: 0.4 MG/DL (ref 0.1–1)
BUN SERPL-MCNC: 17 MG/DL (ref 8–23)
CALCIUM SERPL-MCNC: 9.3 MG/DL (ref 8.7–10.5)
CHLORIDE SERPL-SCNC: 105 MMOL/L (ref 95–110)
CHOLEST SERPL-MCNC: 176 MG/DL (ref 120–199)
CHOLEST/HDLC SERPL: 2.9 {RATIO} (ref 2–5)
CO2 SERPL-SCNC: 30 MMOL/L (ref 23–29)
CREAT SERPL-MCNC: 1.1 MG/DL (ref 0.5–1.4)
DIFFERENTIAL METHOD BLD: ABNORMAL
EOSINOPHIL # BLD AUTO: 0.2 K/UL (ref 0–0.5)
EOSINOPHIL NFR BLD: 3.7 % (ref 0–8)
ERYTHROCYTE [DISTWIDTH] IN BLOOD BY AUTOMATED COUNT: 13.3 % (ref 11.5–14.5)
EST. GFR  (NO RACE VARIABLE): >60 ML/MIN/1.73 M^2
GLUCOSE SERPL-MCNC: 106 MG/DL (ref 70–110)
HCT VFR BLD AUTO: 44.5 % (ref 40–54)
HDLC SERPL-MCNC: 61 MG/DL (ref 40–75)
HDLC SERPL: 34.7 % (ref 20–50)
HGB BLD-MCNC: 14.5 G/DL (ref 14–18)
IMM GRANULOCYTES # BLD AUTO: 0.02 K/UL (ref 0–0.04)
IMM GRANULOCYTES NFR BLD AUTO: 0.3 % (ref 0–0.5)
LDLC SERPL CALC-MCNC: 99.8 MG/DL (ref 63–159)
LYMPHOCYTES # BLD AUTO: 2.1 K/UL (ref 1–4.8)
LYMPHOCYTES NFR BLD: 35.4 % (ref 18–48)
MCH RBC QN AUTO: 30.7 PG (ref 27–31)
MCHC RBC AUTO-ENTMCNC: 32.6 G/DL (ref 32–36)
MCV RBC AUTO: 94 FL (ref 82–98)
MONOCYTES # BLD AUTO: 0.6 K/UL (ref 0.3–1)
MONOCYTES NFR BLD: 10.5 % (ref 4–15)
NEUTROPHILS # BLD AUTO: 2.9 K/UL (ref 1.8–7.7)
NEUTROPHILS NFR BLD: 48.9 % (ref 38–73)
NONHDLC SERPL-MCNC: 115 MG/DL
NRBC BLD-RTO: 0 /100 WBC
OHS QRS DURATION: 126 MS
OHS QTC CALCULATION: 452 MS
PLATELET # BLD AUTO: 238 K/UL (ref 150–450)
PMV BLD AUTO: 8.8 FL (ref 9.2–12.9)
POTASSIUM SERPL-SCNC: 4.3 MMOL/L (ref 3.5–5.1)
PROT SERPL-MCNC: 7 G/DL (ref 6–8.4)
PTH-INTACT SERPL-MCNC: 49.4 PG/ML (ref 9–77)
RBC # BLD AUTO: 4.72 M/UL (ref 4.6–6.2)
SODIUM SERPL-SCNC: 139 MMOL/L (ref 136–145)
TRIGL SERPL-MCNC: 76 MG/DL (ref 30–150)
TSH SERPL DL<=0.005 MIU/L-ACNC: 1.84 UIU/ML (ref 0.34–5.6)
WBC # BLD AUTO: 5.93 K/UL (ref 3.9–12.7)

## 2024-05-06 PROCEDURE — 83970 ASSAY OF PARATHORMONE: CPT | Performed by: INTERNAL MEDICINE

## 2024-05-06 PROCEDURE — 80061 LIPID PANEL: CPT | Performed by: INTERNAL MEDICINE

## 2024-05-06 PROCEDURE — 80053 COMPREHEN METABOLIC PANEL: CPT | Performed by: INTERNAL MEDICINE

## 2024-05-06 PROCEDURE — 85025 COMPLETE CBC W/AUTO DIFF WBC: CPT | Performed by: INTERNAL MEDICINE

## 2024-05-06 PROCEDURE — 84443 ASSAY THYROID STIM HORMONE: CPT | Performed by: INTERNAL MEDICINE

## 2024-05-06 PROCEDURE — 36415 COLL VENOUS BLD VENIPUNCTURE: CPT | Performed by: INTERNAL MEDICINE

## 2024-05-06 NOTE — PROGRESS NOTES
Labs look pretty much unchanged. Glucose has improved. No electrolyte issues. Normal liver and kidney function.   Normal blood count with no anemia.   PTH is normal.   Thyroid is well within normal range.   Cholesterol is stable.   Honestly labs look great. Sadly no explanation here for how he's been feeling. Hope he continues to improve as again we cannot see any explanation for his sysmptoms including thyroid etc

## 2024-05-06 NOTE — TELEPHONE ENCOUNTER
----- Message from Mercedes Clemens MD sent at 5/6/2024  9:25 AM CDT -----  Labs look pretty much unchanged. Glucose has improved. No electrolyte issues. Normal liver and kidney function.   Normal blood count with no anemia.   PTH is normal.   Thyroid is well within normal range.   Cholesterol is stable.   Honestly labs look great. Sadly no explanation here for how he's been feeling. Hope he continues to improve as again we cannot see any explanation for his sysmptoms including thyroid etc

## 2024-05-07 RX ORDER — LIOTHYRONINE SODIUM 5 UG/1
5 TABLET ORAL 2 TIMES DAILY
Start: 2024-05-07

## 2024-05-07 NOTE — PROGRESS NOTES
This patient is followed by you. Would you kindly review his EKG. He's the patient who suffers from these severe episodes of fatigue. It was ordered as In office the nurse got a HR of 34. On manual recheck I got 54 which is his norm. Any worries with this EKG?

## 2024-05-08 ENCOUNTER — TELEPHONE (OUTPATIENT)
Dept: PRIMARY CARE CLINIC | Facility: CLINIC | Age: 84
End: 2024-05-08
Payer: MEDICARE

## 2024-05-08 NOTE — TELEPHONE ENCOUNTER
Patient called back because his wife is quite upset that he is about to have a CVA due to his blood pressure being high today.

## 2024-05-08 NOTE — TELEPHONE ENCOUNTER
Patient reports his BP has been elevated for a few days. His highest reading was 172/107. Patient was instructed to track his BP until Friday and call with his readings. Patient was instructed to go to the nearest ER for any sudden onset one-sided weakness or crushing chest pain. He verbalized understanding.

## 2024-05-08 NOTE — TELEPHONE ENCOUNTER
Called patient. BP now is at 146. Was very worried earlier today when his BP was 170. No symptoms of headache nor chest pain etc.   Affirms he had recently eaten out and indeed must have been consuming more salt than usual. Will take an extra losartan 25mg if BP stays elevated. However instructed patient that when his BP is elevated, I want him to   Remain as calm as possibly. Recheck in 30-60 mins and if still eleavted may  take half or full losartan, if its going down just monitor.   If increase and with any symptoms advised to go to the ER,     Affirms understanding.

## 2024-05-09 ENCOUNTER — TELEPHONE (OUTPATIENT)
Dept: PRIMARY CARE CLINIC | Facility: CLINIC | Age: 84
End: 2024-05-09
Payer: MEDICARE

## 2024-05-09 NOTE — TELEPHONE ENCOUNTER
Called patient today to check on how his bp is doing and he relays that its much better today. Is concered that he's getting varying numbers on the machine.it can differ by 20 points between checks. Instructed him to have his machine checked and he agreed. But overall his BP is much gordo rthan it was yesterday and back to normal range.

## 2024-05-13 ENCOUNTER — PATIENT MESSAGE (OUTPATIENT)
Dept: CARDIOLOGY | Facility: CLINIC | Age: 84
End: 2024-05-13
Payer: MEDICARE

## 2024-05-15 ENCOUNTER — OFFICE VISIT (OUTPATIENT)
Dept: CARDIOLOGY | Facility: CLINIC | Age: 84
End: 2024-05-15
Payer: MEDICARE

## 2024-05-15 VITALS
WEIGHT: 206 LBS | RESPIRATION RATE: 16 BRPM | OXYGEN SATURATION: 98 % | SYSTOLIC BLOOD PRESSURE: 120 MMHG | HEIGHT: 72 IN | HEART RATE: 54 BPM | DIASTOLIC BLOOD PRESSURE: 70 MMHG | BODY MASS INDEX: 27.9 KG/M2

## 2024-05-15 DIAGNOSIS — R53.83 FATIGUE, UNSPECIFIED TYPE: ICD-10-CM

## 2024-05-15 DIAGNOSIS — F32.1 CURRENT MODERATE EPISODE OF MAJOR DEPRESSIVE DISORDER, UNSPECIFIED WHETHER RECURRENT: Primary | ICD-10-CM

## 2024-05-15 DIAGNOSIS — I51.89 DIASTOLIC DYSFUNCTION WITHOUT HEART FAILURE: ICD-10-CM

## 2024-05-15 DIAGNOSIS — I49.3 PVC (PREMATURE VENTRICULAR CONTRACTION): ICD-10-CM

## 2024-05-15 DIAGNOSIS — R94.39 ABNORMAL FINDING ON CARDIOVASCULAR STRESS TEST: ICD-10-CM

## 2024-05-15 DIAGNOSIS — E03.9 ACQUIRED HYPOTHYROIDISM: ICD-10-CM

## 2024-05-15 DIAGNOSIS — I10 ESSENTIAL HYPERTENSION: ICD-10-CM

## 2024-05-15 PROCEDURE — 99214 OFFICE O/P EST MOD 30 MIN: CPT | Mod: PBBFAC,PN | Performed by: INTERNAL MEDICINE

## 2024-05-15 PROCEDURE — 99214 OFFICE O/P EST MOD 30 MIN: CPT | Mod: S$PBB,,, | Performed by: INTERNAL MEDICINE

## 2024-05-15 PROCEDURE — 99999 PR PBB SHADOW E&M-EST. PATIENT-LVL IV: CPT | Mod: PBBFAC,,, | Performed by: INTERNAL MEDICINE

## 2024-05-15 NOTE — PROGRESS NOTES
Subjective:    Patient ID:  Gilberto Lee is a 83 y.o. male     Chief Complaint   Patient presents with    Fatigue    Hyperlipidemia    Hypertension    Bradycardia       HPI:  Mr Gilberto Lee is a 83 y.o. male   Is here for follow-up.    Patient has been having fatigue and tiredness for the past 4 years and he still feels the same.  He was started on Cytomel and he felt little bit better.    Patient checks his blood pressure and also heart rate on the heart rate appears low on the blood pressure cuff.  At the present time patient is breathing well no shortness a breath denies any chest pain or tightness or heaviness denies any dizziness or loss of consciousness.    Review of patient's allergies indicates:   Allergen Reactions    Demerol  [meperidine]      Agitation  Other reaction(s): Unknown       Past Medical History:   Diagnosis Date    Arthritis     Atelectasis of left lung 2015    Bradycardia 2015    Calcium blood increased 2016    Depression     Diastolic dysfunction without heart failure 2015    Elevated BUN 2016    Fatigue 2015    Hyperglycemia 2016    Hyperlipidemia     Migraine without aura 10/2015    Prehypertension 2016    Testosterone deficiency 2015    Thyroid disease      Past Surgical History:   Procedure Laterality Date    ACHILLES TENDON SURGERY      APPENDECTOMY      DUPUYTREN CONTRACTURE RELEASE  2014    LUMBAR EPIDURAL INJECTION      PROSTATE SURGERY      SACROILIAC JOINT INJECTION      TONSILLECTOMY      TRANSURETHRAL RESECTION OF PROSTATE       Social History     Tobacco Use    Smoking status: Former     Current packs/day: 0.00     Types: Cigarettes     Quit date: 1972     Years since quittin.8    Smokeless tobacco: Never   Substance Use Topics    Alcohol use: Yes     Comment: occassional    Drug use: No     Family History   Problem Relation Name Age of Onset    COPD Father          Review of Systems:   Constitution: Negative for  diaphoresis and fever.   HEENT: Negative for nosebleeds.    Cardiovascular: Negative for chest pain         Intermittent dyspnea on exertion       No leg swelling        No palpitations  Respiratory: Negative for shortness of breath and wheezing.    Hematologic/Lymphatic: Negative for bleeding problem. Does not bruise/bleed easily.   Skin: Negative for color change and rash.   Musculoskeletal: Negative for falls and myalgias.   Gastrointestinal: Negative for hematemesis and hematochezia.   Genitourinary: Negative for hematuria.   Neurological: Negative for dizziness and light-headedness.   Psychiatric/Behavioral: Negative for altered mental status and memory loss.          Objective:        Vitals:    05/15/24 1343   BP: 120/70   Pulse: (!) 54   Resp: 16       Lab Results   Component Value Date    WBC 5.93 05/06/2024    HGB 14.5 05/06/2024    HCT 44.5 05/06/2024     05/06/2024    CHOL 176 05/06/2024    TRIG 76 05/06/2024    HDL 61 05/06/2024    ALT 20 05/06/2024    AST 28 05/06/2024     05/06/2024    K 4.3 05/06/2024     05/06/2024    CREATININE 1.1 05/06/2024    BUN 17 05/06/2024    CO2 30 (H) 05/06/2024    TSH 1.837 05/06/2024    PSA 1.75 08/12/2013    HGBA1C 5.6 11/30/2023        ECHOCARDIOGRAM RESULTS  Results for orders placed during the hospital encounter of 02/06/23    Echo    Interpretation Summary  · The left ventricle is normal in size with concentric remodeling and normal systolic function.  · The estimated ejection fraction is 70%.  · Normal left ventricular diastolic function.  · Normal right ventricular size with normal right ventricular systolic function.  · Moderate left atrial enlargement.        CURRENT/PREVIOUS VISIT EKG  Results for orders placed or performed in visit on 05/03/24   IN OFFICE EKG 12-LEAD (to [place])    Collection Time: 05/03/24  2:28 PM   Result Value Ref Range    QRS Duration 126 ms    [facility] QTC Calculation 452 ms    Narrative    Test Reason : R00.1,    Vent. Rate  : 066 BPM     Atrial Rate : 061 BPM     P-R Int : 224 ms          QRS Dur : 126 ms      QT Int : 432 ms       P-R-T Axes : 055 001 011 degrees     QTc Int : 452 ms    Undetermined rhythm  Right bundle branch block  Abnormal ECG  When compared with ECG of 30-AUG-2023 16:17,  Current undetermined rhythm precludes rhythm comparison, needs review  Left anterior fascicular block is no longer Present  Confirmed by Igor Muhammad MD ([id number]) on 5/6/2024 2:43:27 PM    Referred By:             Confirmed By:Igor Muhammad MD     No valid procedures specified.   Results for orders placed during the hospital encounter of 09/19/23    Nuclear Stress - Cardiology Interpreted    Interpretation Summary    Equivocal myocardial perfusion scan.    There is a moderate sized, equivocal perfusion abnormality that is fixed. This finding is equivocal due to soft tissue shadow.  No reversible Focal perfusion defect is stress images take up radioisotope better than the rest images.    There are no other significant perfusion abnormalities.    The gated perfusion images showed an ejection fraction of 73% at rest. The gated perfusion images showed an ejection fraction of 82% post stress. Normal ejection fraction is greater than 53%.    There is normal wall motion at rest and post stress.    LV cavity size is normal at rest and normal at stress.    The ECG portion of the study is abnormal but not diagnostic due to resting ST-T abnormalities.    The patient reported no chest pain during the stress test.    There were no arrhythmias during stress.      Physical Exam:  CONSTITUTIONAL: No fever, no chills  HEENT: Normocephalic, atraumatic,pupils reactive to light                 NECK:  No JVD no carotid bruit  CVS: S1S2+, RRR, systolic murmurs,   LUNGS: Clear  ABDOMEN: Soft, NT, BS+  EXTREMITIES: No cyanosis, edema  : No de catheter  NEURO: AAO X 3  PSY: Normal affect      Medication List with Changes/Refills   Current Medications     ACETYLCARNITINE HCL (ACETYL L-CARNITINE ORAL)    3 (three) times daily.    ARIPIPRAZOLE (ABILIFY) 2 MG TAB    Take 1 tablet (2 mg total) by mouth once daily.    BUPROPION (WELLBUTRIN SR) 150 MG TBSR 12 HR TABLET    Take 1 tablet (150 mg total) by mouth 2 (two) times daily.    BUPROPION (WELLBUTRIN) 75 MG TABLET    Take 1 tablet (75 mg total) by mouth 6 (six) times daily. TWO TABLETS IN THE MORNING 2 TABLETS IN THE AFTERNOON TWO TABLETS BY MOUTH IN THE EVENING    CALCIUM CITRATE/VITAMIN D3 (CITRACAL + D ORAL)    Take by mouth. Taking 2 tablets by mouth daily    FLUTICASONE PROPIONATE (FLONASE) 50 MCG/ACTUATION NASAL SPRAY    1 spray (50 mcg total) by Each Nostril route once daily.    GLUCOSAMINE-CHONDROIT-VIT C--400 MG CAPSULE    Take 1 tablet by mouth 3 (three) times daily. 3 Capsule Oral Every day    LEVOTHYROXINE (SYNTHROID) 75 MCG TABLET    Take 75 mcg (1 tab) by mouth daily 6 days per week, and skip one day each week.    LIOTHYRONINE (CYTOMEL) 5 MCG TAB    Take 1 tablet (5 mcg total) by mouth 2 (two) times a day.    LORATADINE (CLARITIN) 10 MG TABLET    Take 10 mg by mouth once daily.    LOSARTAN (COZAAR) 25 MG TABLET    Take 1 tablet (25 mg total) by mouth once daily.    NAPROXEN SODIUM (ANAPROX) 220 MG TABLET    Take 220 mg by mouth 2 (two) times daily with meals. 1 Tablet Oral Twice a day     OMEGA-3 FATTY ACIDS 1,000 MG CAP    Take 2 capsules by mouth once daily. 1 Capsule Oral Every day    SIMVASTATIN (ZOCOR) 20 MG TABLET    Take 1 tablet (20 mg total) by mouth once daily.    SUMATRIPTAN (IMITREX) 100 MG TABLET    Take 1 tablet (100 mg total) by mouth once as needed for Migraine.    VIT C,Q-EA-ZRCDY-LUTEIN-ZEAXAN (PRESERVISION AREDS-2) 250-90-40-1 MG CAP        VIT C/E/ZN/COPPR/LUTEIN/ZEAXAN (PRESERVISION AREDS-2 ORAL)    Take by mouth once daily.             Assessment:       1. Current moderate episode of major depressive disorder, unspecified whether recurrent    2. Fatigue, unspecified type    3.  Abnormal finding on cardiovascular stress test    4. PVC (premature ventricular contraction)    5. Acquired hypothyroidism    6. Essential hypertension    7. Diastolic dysfunction without heart failure         Plan:    1.  Depression   Patient is on Abilify as well as on Wellbutrin  He takes Wellbutrin 75 mg 6 times daily.  Patient follows up with his primary physician.    2.  Fatigue and tiredness   Will decrease the dosage of losartan to 12.5 mg p.o. daily  Patient to wear compression stockings  Patient to stop taking PreserVision  medication for now also patient to stop style carnitine and will hold his simvastatin patient to hold off omega-3 fatty acids also to hold of glucosamine and chondroitin and also to hold of calcium citrate.  Need to go to the etiology of his fatigue.  3.  PVCs   Patient has pre frequent PVCs.  And the major side effect of the Wellbutrin is frequent PVCs.  Will also check his blood work a potassium level as well as magnesium level and need to evaluate his heart for bradycardia.  Will do a Zio monitoring.  4.  Acquired hypothyroidism   Bradycardia probably is associated with his hypothyroidism.  Patient follows up with his primary physician in regards with it.  5. Essential hypertension   His blood pressure is 120/70.  And need to essentially rule out if he has orthostasis.    6.  EKG   Reviewed his EKG from 3rd May undetermined rhythm what appears to be actually sinus rhythm with leftward axis and right bundle branch block and complete with frequent PVCs no acute ST T-wave changes.  7. Patient to follow-up after the testing.            Problem List Items Addressed This Visit          Cardiac/Vascular    Essential hypertension    Diastolic dysfunction without heart failure    Overview     grade I, per TTE in 7/2015.  referral to cardiology.saw dr. Zamora, who told him not to come back that hes fine. That was 8 years ago and now with his PICKERING, would like to repeat ECHO.              Endocrine    Acquired hypothyroidism       Other    Fatigue     Other Visit Diagnoses       Current moderate episode of major depressive disorder, unspecified whether recurrent    -  Primary    Abnormal finding on cardiovascular stress test        PVC (premature ventricular contraction)                No follow-ups on file.

## 2024-05-16 ENCOUNTER — HOSPITAL ENCOUNTER (OUTPATIENT)
Dept: CARDIOLOGY | Facility: CLINIC | Age: 84
Discharge: HOME | End: 2024-05-16
Attending: INTERNAL MEDICINE
Payer: MEDICARE

## 2024-05-16 DIAGNOSIS — I49.3 PVC (PREMATURE VENTRICULAR CONTRACTION): ICD-10-CM

## 2024-05-16 DIAGNOSIS — R94.39 ABNORMAL FINDING ON CARDIOVASCULAR STRESS TEST: ICD-10-CM

## 2024-05-16 PROCEDURE — 93244 EXT ECG>48HR<7D REV&INTERPJ: CPT | Mod: ,,, | Performed by: INTERNAL MEDICINE

## 2024-05-29 LAB
OHS CV EVENT MONITOR DAY: 6
OHS CV HOLTER HOOKUP DATE: NORMAL
OHS CV HOLTER HOOKUP TIME: NORMAL
OHS CV HOLTER LENGTH DECIMAL HOURS: 145
OHS CV HOLTER LENGTH HOURS: 1
OHS CV HOLTER LENGTH MINUTES: 0
OHS CV HOLTER SCAN DATE: NORMAL
OHS CV HOLTER SINUS AVERAGE HR: 60 BPM
OHS CV HOLTER SINUS MAX HR: 88 BPM
OHS CV HOLTER SINUS MIN HR: 47 BPM
OHS CV HOLTER STUDY END DATE: NORMAL
OHS CV HOLTER STUDY END TIME: NORMAL

## 2024-06-19 ENCOUNTER — TELEPHONE (OUTPATIENT)
Dept: CARDIOLOGY | Facility: CLINIC | Age: 84
End: 2024-06-19

## 2024-06-19 ENCOUNTER — OFFICE VISIT (OUTPATIENT)
Dept: CARDIOLOGY | Facility: CLINIC | Age: 84
End: 2024-06-19
Payer: MEDICARE

## 2024-06-19 VITALS
RESPIRATION RATE: 16 BRPM | BODY MASS INDEX: 27.9 KG/M2 | DIASTOLIC BLOOD PRESSURE: 60 MMHG | WEIGHT: 206 LBS | HEIGHT: 72 IN | OXYGEN SATURATION: 98 % | HEART RATE: 61 BPM | SYSTOLIC BLOOD PRESSURE: 106 MMHG

## 2024-06-19 DIAGNOSIS — R00.1 BRADYCARDIA: Primary | ICD-10-CM

## 2024-06-19 DIAGNOSIS — I45.2 BIFASCICULAR BUNDLE BRANCH BLOCK: ICD-10-CM

## 2024-06-19 DIAGNOSIS — I51.89 DIASTOLIC DYSFUNCTION WITHOUT HEART FAILURE: ICD-10-CM

## 2024-06-19 DIAGNOSIS — I10 ESSENTIAL HYPERTENSION: ICD-10-CM

## 2024-06-19 DIAGNOSIS — I44.0 FIRST DEGREE AV BLOCK: ICD-10-CM

## 2024-06-19 DIAGNOSIS — N40.0 BPH WITHOUT OBSTRUCTION/LOWER URINARY TRACT SYMPTOMS: ICD-10-CM

## 2024-06-19 DIAGNOSIS — E03.9 ACQUIRED HYPOTHYROIDISM: ICD-10-CM

## 2024-06-19 PROCEDURE — 99214 OFFICE O/P EST MOD 30 MIN: CPT | Mod: PBBFAC,PN | Performed by: INTERNAL MEDICINE

## 2024-06-19 PROCEDURE — 99214 OFFICE O/P EST MOD 30 MIN: CPT | Mod: S$PBB,,, | Performed by: INTERNAL MEDICINE

## 2024-06-19 PROCEDURE — 99999 PR PBB SHADOW E&M-EST. PATIENT-LVL IV: CPT | Mod: PBBFAC,,, | Performed by: INTERNAL MEDICINE

## 2024-06-19 NOTE — PROGRESS NOTES
Subjective:    Patient ID:  Gilberto Lee is a 83 y.o. male     Chief Complaint   Patient presents with    Results       HPI:  Mr Gilberto Lee is a 83 y.o. male is here for follow-up.    Patient recently had an event monitor/Holter monitor put in and is here for the results.    Patient states that for the past couple of years to few months he had been feeling fatigued and tired most of the day.  And for the past 3 days he has been feeling much better.      Denies any chest pain or tightness or heaviness his breathing is good denies any loss of consciousness falls or head injury.        Review of patient's allergies indicates:   Allergen Reactions    Demerol  [meperidine]      Agitation  Other reaction(s): Unknown       Past Medical History:   Diagnosis Date    Arthritis     Atelectasis of left lung 2015    Bradycardia 2015    Calcium blood increased 2016    Depression     Diastolic dysfunction without heart failure 2015    Elevated BUN 2016    Fatigue 2015    Hyperglycemia 2016    Hyperlipidemia     Migraine without aura 10/2015    Prehypertension 2016    Testosterone deficiency 2015    Thyroid disease      Past Surgical History:   Procedure Laterality Date    ACHILLES TENDON SURGERY      APPENDECTOMY      DUPUYTREN CONTRACTURE RELEASE  2014    LUMBAR EPIDURAL INJECTION  2013    PROSTATE SURGERY      SACROILIAC JOINT INJECTION      TONSILLECTOMY      TRANSURETHRAL RESECTION OF PROSTATE       Social History     Tobacco Use    Smoking status: Former     Current packs/day: 0.00     Types: Cigarettes     Quit date: 1972     Years since quittin.9    Smokeless tobacco: Never   Substance Use Topics    Alcohol use: Yes     Comment: occassional    Drug use: No     Family History   Problem Relation Name Age of Onset    COPD Father          Review of Systems:   Constitution: Negative for diaphoresis and fever.   HEENT: Negative for nosebleeds.    Cardiovascular:  Negative for chest pain       No dyspnea on exertion       No leg swelling       Recurrent palpitations  Bradycardia  Respiratory: Negative for shortness of breath and wheezing.    Hematologic/Lymphatic: Negative for bleeding problem. Does not bruise/bleed easily.   Skin: Negative for color change and rash.   Musculoskeletal: Negative for falls and myalgias.   Gastrointestinal: Negative for hematemesis and hematochezia.   Genitourinary: Negative for hematuria.   Neurological: Negative for dizziness and light-headedness.   Psychiatric/Behavioral: Negative for altered mental status and memory loss.          Objective:        Vitals:    06/19/24 1423   BP: 106/60   Pulse: 61   Resp: 16       Lab Results   Component Value Date    WBC 5.93 05/06/2024    HGB 14.5 05/06/2024    HCT 44.5 05/06/2024     05/06/2024    CHOL 176 05/06/2024    TRIG 76 05/06/2024    HDL 61 05/06/2024    ALT 20 05/06/2024    AST 28 05/06/2024     05/06/2024    K 4.3 05/06/2024     05/06/2024    CREATININE 1.1 05/06/2024    BUN 17 05/06/2024    CO2 30 (H) 05/06/2024    TSH 1.837 05/06/2024    PSA 1.75 08/12/2013    HGBA1C 5.6 11/30/2023        ECHOCARDIOGRAM RESULTS  Results for orders placed during the hospital encounter of 02/06/23    Echo    Interpretation Summary  · The left ventricle is normal in size with concentric remodeling and normal systolic function.  · The estimated ejection fraction is 70%.  · Normal left ventricular diastolic function.  · Normal right ventricular size with normal right ventricular systolic function.  · Moderate left atrial enlargement.        CURRENT/PREVIOUS VISIT EKG  Results for orders placed or performed in visit on 05/03/24   IN OFFICE EKG 12-LEAD (to [place])    Collection Time: 05/03/24  2:28 PM   Result Value Ref Range    QRS Duration 126 ms    [facility] QTC Calculation 452 ms    Narrative    Test Reason : R00.1,    Vent. Rate : 066 BPM     Atrial Rate : 061 BPM     P-R Int : 224 ms          QRS Dur  : 126 ms      QT Int : 432 ms       P-R-T Axes : 055 001 011 degrees     QTc Int : 452 ms    Undetermined rhythm  Right bundle branch block  Abnormal ECG  When compared with ECG of 30-AUG-2023 16:17,  Current undetermined rhythm precludes rhythm comparison, needs review  Left anterior fascicular block is no longer Present  Confirmed by Igor Muhammad MD ([id number]) on 5/6/2024 2:43:27 PM    Referred By:             Confirmed By:Igor Muhammad MD     No valid procedures specified.   Results for orders placed during the hospital encounter of 09/19/23    Nuclear Stress - Cardiology Interpreted    Interpretation Summary    Equivocal myocardial perfusion scan.    There is a moderate sized, equivocal perfusion abnormality that is fixed. This finding is equivocal due to soft tissue shadow.  No reversible Focal perfusion defect is stress images take up radioisotope better than the rest images.    There are no other significant perfusion abnormalities.    The gated perfusion images showed an ejection fraction of 73% at rest. The gated perfusion images showed an ejection fraction of 82% post stress. Normal ejection fraction is greater than 53%.    There is normal wall motion at rest and post stress.    LV cavity size is normal at rest and normal at stress.    The ECG portion of the study is abnormal but not diagnostic due to resting ST-T abnormalities.    The patient reported no chest pain during the stress test.    There were no arrhythmias during stress.      Physical Exam:  CONSTITUTIONAL: No fever, no chills  HEENT: Normocephalic, atraumatic,pupils reactive to light                 NECK:  No JVD no carotid bruit  CVS: S1S2+, RRR, systolic murmurs,   LUNGS: Clear  ABDOMEN: Soft, NT, BS+  EXTREMITIES: No cyanosis, edema  : No de catheter  NEURO: AAO X 3  PSY: Normal affect      Medication List with Changes/Refills   Current Medications    ACETYLCARNITINE HCL (ACETYL L-CARNITINE ORAL)    3 (three) times daily.     ARIPIPRAZOLE (ABILIFY) 2 MG TAB    Take 1 tablet (2 mg total) by mouth once daily.    BUPROPION (WELLBUTRIN SR) 150 MG TBSR 12 HR TABLET    Take 1 tablet (150 mg total) by mouth 2 (two) times daily.    BUPROPION (WELLBUTRIN) 75 MG TABLET    Take 1 tablet (75 mg total) by mouth 6 (six) times daily. TWO TABLETS IN THE MORNING 2 TABLETS IN THE AFTERNOON TWO TABLETS BY MOUTH IN THE EVENING    CALCIUM CITRATE/VITAMIN D3 (CITRACAL + D ORAL)    Take by mouth. Taking 2 tablets by mouth daily    FLUTICASONE PROPIONATE (FLONASE) 50 MCG/ACTUATION NASAL SPRAY    1 spray (50 mcg total) by Each Nostril route once daily.    GLUCOSAMINE-CHONDROIT-VIT C--400 MG CAPSULE    Take 1 tablet by mouth 3 (three) times daily. 3 Capsule Oral Every day    LEVOTHYROXINE (SYNTHROID) 75 MCG TABLET    Take 75 mcg (1 tab) by mouth daily 6 days per week, and skip one day each week.    LIOTHYRONINE (CYTOMEL) 5 MCG TAB    Take 1 tablet (5 mcg total) by mouth 2 (two) times a day.    LORATADINE (CLARITIN) 10 MG TABLET    Take 10 mg by mouth once daily.    LOSARTAN (COZAAR) 25 MG TABLET    Take 1 tablet (25 mg total) by mouth once daily.    NAPROXEN SODIUM (ANAPROX) 220 MG TABLET    Take 220 mg by mouth 2 (two) times daily with meals. 1 Tablet Oral Twice a day     OMEGA-3 FATTY ACIDS 1,000 MG CAP    Take 2 capsules by mouth once daily. 1 Capsule Oral Every day    SIMVASTATIN (ZOCOR) 20 MG TABLET    Take 1 tablet (20 mg total) by mouth once daily.    SUMATRIPTAN (IMITREX) 100 MG TABLET    Take 1 tablet (100 mg total) by mouth once as needed for Migraine.    VIT C,J-DS-HITZI-LUTEIN-ZEAXAN (PRESERVISION AREDS-2) 250-90-40-1 MG CAP        VIT C/E/ZN/COPPR/LUTEIN/ZEAXAN (PRESERVISION AREDS-2 ORAL)    Take by mouth once daily.             Assessment:       1. Bradycardia    2. Bifascicular bundle branch block    3. First degree AV block    4. Diastolic dysfunction without heart failure    5. Essential hypertension    6. BPH without obstruction/lower  urinary tract symptoms - s/p turp - last psa in june of 2016 1.0    7. Acquired hypothyroidism         Plan:   1. Bradycardia   Has had multiple episodes of bradycardia even on the Holter monitor he has had episodes of bradycardia.  Patient had always had bradycardia and they can not get his heart drop even on stress test or treadmill.  At the present time patient is stable has no significant issues.      2. Bifascicular block and first-degree AV block   Patient has both bifascicular block and also borderline first-degree AV block and needs further evaluation will consult electrophysiology for the same.  Consideration for possible permanent pacemaker implantation.    3. Diastolic dysfunction without heart failure  Patient is currently on losartan 25 mg p.o. daily continue the same.    4. Acquired hypothyroidism   Patient's primary physician is still trying to regulate his thyroid medication.    5. Essential hypertension   Patient's blood pressure is stable continue losartan 25 mg p.o. daily.    6. Patient is undergoing chemotherapy on Cytomel.  In fact the dose of the Cytomel had been decrease in his blood pressure has improved and he feels much better. .    Continue current management and I will see him back in the office in 6 months time.        Problem List Items Addressed This Visit          Cardiac/Vascular    Essential hypertension    Diastolic dysfunction without heart failure    Overview     grade I, per TTE in 7/2015.  referral to cardiology.saw dr. Zamora, who told him not to come back that hes fine. That was 8 years ago and now with his PICKERING, would like to repeat ECHO.          Bradycardia - Primary    First degree AV block    Bifascicular bundle branch block       Renal/    BPH without obstruction/lower urinary tract symptoms - s/p turp - last psa in june of 2016 1.0    Overview     On flomax with good results            Endocrine    Acquired hypothyroidism       No follow-ups on file.

## 2024-06-24 ENCOUNTER — PATIENT MESSAGE (OUTPATIENT)
Dept: CARDIOLOGY | Facility: CLINIC | Age: 84
End: 2024-06-24
Payer: MEDICARE

## 2024-06-24 ENCOUNTER — TELEPHONE (OUTPATIENT)
Dept: ELECTROPHYSIOLOGY | Facility: CLINIC | Age: 84
End: 2024-06-24
Payer: MEDICARE

## 2024-06-25 ENCOUNTER — TELEPHONE (OUTPATIENT)
Dept: ELECTROPHYSIOLOGY | Facility: CLINIC | Age: 84
End: 2024-06-25
Payer: MEDICARE

## 2024-07-16 NOTE — PROGRESS NOTES
Subjective     HPI    I had the pleasure of seeing Gilberto Lee in consultation at your request for the evaluation of bradycardia and bifascicular block. He is an 83M with HTN, HLD, hypothyroidism on synthroid, preserved EF, with complaints of fatigue over the past 4-5 years. This prompted a 7 day Zio monitor (5/2024) which showed sinus rhythm average HR 60 bpm (range 47-88 bpm--88 bpm occurred while riding bicycle), PAC burden 8.3%, PVC burden 10.6%, no arrhythmias.    Echo in 2/2023 showed EF 70%. Regadenoson SPECT in 9/2023 was equivocal (soft tissue artifact).    Review of Systems   Constitutional: Negative for decreased appetite, malaise/fatigue, weight gain and weight loss.   HENT:  Negative for sore throat.    Eyes:  Negative for blurred vision.   Cardiovascular:  Negative for chest pain, dyspnea on exertion, irregular heartbeat, leg swelling, near-syncope, orthopnea, palpitations, paroxysmal nocturnal dyspnea and syncope.   Respiratory:  Negative for shortness of breath.    Skin:  Negative for rash.   Musculoskeletal:  Negative for arthritis.   Gastrointestinal:  Negative for abdominal pain.   Neurological:  Negative for focal weakness.   Psychiatric/Behavioral:  Negative for altered mental status.       Objective     Physical Exam  Vitals and nursing note reviewed.   Constitutional:       General: He is not in acute distress.     Appearance: He is well-developed.   HENT:      Head: Normocephalic and atraumatic.   Eyes:      General: No scleral icterus.     Pupils: Pupils are equal, round, and reactive to light.   Neck:      Thyroid: No thyromegaly.   Cardiovascular:      Rate and Rhythm: Regular rhythm.      Pulses: Normal pulses.      Heart sounds: Normal heart sounds. No murmur heard.     No friction rub. No gallop.   Pulmonary:      Effort: Pulmonary effort is normal.      Breath sounds: Normal breath sounds.   Abdominal:      General: Bowel sounds are normal. There is no distension.      Palpations:  Abdomen is soft.      Tenderness: There is no abdominal tenderness.   Musculoskeletal:      Cervical back: Neck supple.   Skin:     General: Skin is warm and dry.      Findings: No rash.   Neurological:      Mental Status: He is alert and oriented to person, place, and time.   Psychiatric:         Behavior: Behavior normal.        Assessment and Plan     1. Bradycardia    2. Bifascicular bundle branch block    3. Mixed hyperlipidemia    4. Essential hypertension    5. Dyslipidemia        Plan:     In summary, Gilberto Lee is an 83M with a history of chronotropic incompetence, complicated by need for AVN blockers to suppress PVCs which may also be symptomatic.    Discussed risks, benefits, indications, alternatives to dual chamber pacemaker implantation. After considering his options he has agreed to proceed.    Nanapironik to allow for CLS.    Thank you for allowing me to participate in the care of this patient. Please do not hesitate to call me with any questions or concerns.

## 2024-07-17 ENCOUNTER — OFFICE VISIT (OUTPATIENT)
Dept: CARDIOLOGY | Facility: CLINIC | Age: 84
End: 2024-07-17
Payer: MEDICARE

## 2024-07-17 VITALS
HEIGHT: 72 IN | WEIGHT: 206.56 LBS | SYSTOLIC BLOOD PRESSURE: 122 MMHG | OXYGEN SATURATION: 98 % | HEART RATE: 40 BPM | BODY MASS INDEX: 27.98 KG/M2 | DIASTOLIC BLOOD PRESSURE: 68 MMHG | RESPIRATION RATE: 16 BRPM

## 2024-07-17 DIAGNOSIS — I10 ESSENTIAL HYPERTENSION: ICD-10-CM

## 2024-07-17 DIAGNOSIS — I45.2 BIFASCICULAR BUNDLE BRANCH BLOCK: ICD-10-CM

## 2024-07-17 DIAGNOSIS — E78.5 DYSLIPIDEMIA: ICD-10-CM

## 2024-07-17 DIAGNOSIS — E78.2 MIXED HYPERLIPIDEMIA: ICD-10-CM

## 2024-07-17 DIAGNOSIS — R00.1 BRADYCARDIA: Primary | ICD-10-CM

## 2024-07-17 PROCEDURE — 99214 OFFICE O/P EST MOD 30 MIN: CPT | Mod: PBBFAC,PN | Performed by: INTERNAL MEDICINE

## 2024-07-17 PROCEDURE — 99205 OFFICE O/P NEW HI 60 MIN: CPT | Mod: S$PBB,,, | Performed by: INTERNAL MEDICINE

## 2024-07-17 PROCEDURE — 99999 PR PBB SHADOW E&M-EST. PATIENT-LVL IV: CPT | Mod: PBBFAC,,, | Performed by: INTERNAL MEDICINE

## 2024-07-18 ENCOUNTER — PATIENT MESSAGE (OUTPATIENT)
Dept: CARDIOLOGY | Facility: CLINIC | Age: 84
End: 2024-07-18
Payer: MEDICARE

## 2024-07-19 ENCOUNTER — TELEPHONE (OUTPATIENT)
Dept: ELECTROPHYSIOLOGY | Facility: CLINIC | Age: 84
End: 2024-07-19
Payer: MEDICARE

## 2024-07-19 NOTE — TELEPHONE ENCOUNTER
Spoke with patient and scheduled on 9/24/2024. Medications, allergies, and absence of implanted devices reviewed with patient. Procedure instructions reviewed with patient, will follow up with written instructions via portal per patient's preference. Pt instructed to notify office of any changes in medications or medical condition.      ----- Message from Paulino Gonzalez MD sent at 7/17/2024 10:51 AM CDT -----  Please schedule for [employer] dual chamber pacer

## 2024-07-23 DIAGNOSIS — R00.1 BRADYCARDIA: ICD-10-CM

## 2024-07-23 DIAGNOSIS — I49.9 CARDIAC ARRHYTHMIA, UNSPECIFIED CARDIAC ARRHYTHMIA TYPE: ICD-10-CM

## 2024-07-23 DIAGNOSIS — I45.2 BIFASCICULAR BUNDLE BRANCH BLOCK: Primary | ICD-10-CM

## 2024-07-30 DIAGNOSIS — Z00.00 ENCOUNTER FOR MEDICARE ANNUAL WELLNESS EXAM: ICD-10-CM

## 2024-08-02 ENCOUNTER — OFFICE VISIT (OUTPATIENT)
Dept: PRIMARY CARE CLINIC | Facility: CLINIC | Age: 84
End: 2024-08-02
Payer: MEDICARE

## 2024-08-02 VITALS
OXYGEN SATURATION: 97 % | HEART RATE: 64 BPM | DIASTOLIC BLOOD PRESSURE: 80 MMHG | HEIGHT: 72 IN | BODY MASS INDEX: 28.15 KG/M2 | WEIGHT: 207.81 LBS | SYSTOLIC BLOOD PRESSURE: 130 MMHG

## 2024-08-02 DIAGNOSIS — G43.009 MIGRAINE WITHOUT AURA AND RESPONSIVE TO TREATMENT: ICD-10-CM

## 2024-08-02 DIAGNOSIS — R53.82 CHRONIC FATIGUE: Primary | ICD-10-CM

## 2024-08-02 DIAGNOSIS — E78.2 MIXED HYPERLIPIDEMIA: ICD-10-CM

## 2024-08-02 DIAGNOSIS — E21.3 HYPERPARATHYROIDISM, UNSPECIFIED: ICD-10-CM

## 2024-08-02 DIAGNOSIS — I10 ESSENTIAL HYPERTENSION: ICD-10-CM

## 2024-08-02 DIAGNOSIS — E07.9 THYROID DISEASE: ICD-10-CM

## 2024-08-02 DIAGNOSIS — E78.00 HYPERCHOLESTEREMIA: ICD-10-CM

## 2024-08-02 DIAGNOSIS — N40.0 BPH WITHOUT OBSTRUCTION/LOWER URINARY TRACT SYMPTOMS: ICD-10-CM

## 2024-08-02 DIAGNOSIS — F32.0 MILD MAJOR DEPRESSION: ICD-10-CM

## 2024-08-02 PROCEDURE — 99999 PR PBB SHADOW E&M-EST. PATIENT-LVL IV: CPT | Mod: PBBFAC,,, | Performed by: INTERNAL MEDICINE

## 2024-08-02 PROCEDURE — 99214 OFFICE O/P EST MOD 30 MIN: CPT | Mod: PBBFAC,PN | Performed by: INTERNAL MEDICINE

## 2024-08-02 NOTE — PROGRESS NOTES
INTERNAL MEDICINE PROGRESS/URGENT CARE NOTE    CHIEF COMPLAINT     Chief Complaint   Patient presents with    Follow-up     Patient presents for his 3 month follow up appointment.       HPI     Gilberto Lee is a 82 y.o.  male who presents with a PMHx of  MDD controlled, dCHF, migraine headaches, HLD, who presents today for routine follow up visit.         Patient reported at his last visit that he was doing muchbetter. Says after starting the cytomel he is back to fishing and has energy etc   Today, he says for the past two weeks, he's been fatigued again. Nothing has changed in regards to activity levels. States his mood is good. No other explanation for his fatigue  He's schedule for a pacemaker in September and we are very hopeful that this will be the fix for his profound fatigue.          fatigue history:   Patient was recently seen to [facility] care and his main major complaint of which he was very distraught about was his fatigue. Sounded new but review of his file shows this complaint has been addressed in great detail back as far as 2015.    From our first and last meeting a month ago, we recorded:   Things discussed and ruled out today:   Cbc is normal so no anemia, thyroid function is normal. All other labs are good. Vitals are normal and show normal HR.   Things ordered to evaluate.   Sleep study  Free testosterone  Vitamin d, and vitamin b12   Echo  PFT   Long discussion re causes of fatigue which were mostly normal for him.   His wife says he sleeps all the time. He sleeps all night, but she says during the day he sleeps as well. Confusing fatigue as he has normal muscle strength. Goes to the gym 3 times per week and is able to do resistance exercise but says hes too tired and with PICKERING when he goes fishing. Has daydayime somnolence and wakes up not feeling rested. Has never had a sleep study.      Mood is normal. Has  a long history of depression but very well treated with the wellbutrin. His PHQ 9  "is 6/27.         Results of the above test are:   ECHO shows normal EF of 70% with normal diastolic function. Otherwise normal function.   Full thyroid eval is normal.   Low free testosterone. Of 3.0. normal levels is 6.6-18 . Previous testosterone tests were normal, but given his age, free was checked.   CMP and other labs came back normal.   He had his sleep test done, which showed mild ANGELIQUE. Suggest machine if patient would like      HE CONSULTED with urology and again total testosterone was checked and came back normal so decided against testosterone injections.      Wrote in a few days again complaining of worsening fatigue.   He is scheduled to see cardiology but I doubt that will be fruitful because patient is able to exercise etc though he reported that his exercise tolerance has significantly reduced.      At his last visit, he reports he was more fatigued for the past two weeks but since yesterday has been feeling much better. Says it acutely worsened for two weeks, then just got better as of yesterday. No other associated symptoms besides fatigue and "just not feeling well"  We increased the dose of his thyroid medication and his well butrin fro 75mg to 150mg, and had him return to re evaluate.      So patient today reports that despite me writing for syntrhoid 75mg he self tritrated his medication to 125mg. Note TSH was barely over 4, we just thought wed try to increase from 50mg to get some effect. Note T3 and T4 was wbnl.   Says not taht hes on the 125mg, hes feeling much better. Not sure I can agree to such a huge increase from 50mg to 125mg. So I will try the 100mg. Wife and patient requested to see endocrinology     This degree of fatigue is quite concerning. His fatigue is asosciated with SOB. Again normal lung exam. Normal PFT. Is able to exercise well when not so fatigued. So not suggestive of cardiac disease, however have referred him to cardiology to further evaluate.          Home " Medications:  Prior to Admission medications    Medication Sig Start Date End Date Taking? Authorizing Provider   acetylcarnitine HCl (ACETYL L-CARNITINE ORAL) 3 (three) times daily. 1/1/21   Provider, Historical   ARIPiprazole (ABILIFY) 2 MG Tab Take 1 tablet (2 mg total) by mouth once daily. 3/26/24   Mercedes Clemens MD   buPROPion (WELLBUTRIN SR) 150 MG TBSR 12 hr tablet Take 1 tablet (150 mg total) by mouth 2 (two) times daily. 8/18/23 8/17/24  Mercedes Clemens MD   buPROPion (WELLBUTRIN) 75 MG tablet Take 1 tablet (75 mg total) by mouth 6 (six) times daily. TWO TABLETS IN THE MORNING 2 TABLETS IN THE AFTERNOON TWO TABLETS BY MOUTH IN THE EVENING 4/4/24   Mercedes Clemens MD   CALCIUM CITRATE/VITAMIN D3 (CITRACAL + D ORAL) Take by mouth. Taking 2 tablets by mouth daily    Provider, Historical   fluticasone propionate (FLONASE) 50 mcg/actuation nasal spray 1 spray (50 mcg total) by Each Nostril route once daily. 10/26/22   Baltazar Greer MD   glucosamine-chondroit-vit C-Mn 500-400 mg capsule Take 1 tablet by mouth 3 (three) times daily. 3 Capsule Oral Every day    Provider, Historical   levothyroxine (SYNTHROID) 75 MCG tablet Take 75 mcg (1 tab) by mouth daily 6 days per week, and skip one day each week. 1/25/24   Shiloh Solorzano MD   liothyronine (CYTOMEL) 5 MCG Tab Take 1 tablet (5 mcg total) by mouth 2 (two) times a day. 5/7/24   Shiloh Solorzano MD   loratadine (CLARITIN) 10 mg tablet Take 10 mg by mouth once daily.    Provider, Historical   losartan (COZAAR) 25 MG tablet Take 1 tablet (25 mg total) by mouth once daily. 11/15/23   Mercedes Clemens MD   naproxen sodium (ANAPROX) 220 MG tablet Take 220 mg by mouth 2 (two) times daily with meals. 1 Tablet Oral Twice a day     Provider, Historical   omega-3 fatty acids 1,000 mg Cap Take 2 capsules by mouth once daily. 1 Capsule Oral Every day    Provider, Historical   simvastatin (ZOCOR) 20 MG tablet Take 1 tablet (20 mg total) by mouth  once daily. 11/15/23   Mercedes Clemens MD   sumatriptan (IMITREX) 100 MG tablet Take 1 tablet (100 mg total) by mouth once as needed for Migraine. 4/4/24 4/4/24  Mercedes Clemens MD   vit C,T-Em-bjfet-lutein-zeaxan (PRESERVISION AREDS-2) 250-90-40-1 mg Cap  1/1/00   Provider, Historical   vit C/E/Zn/coppr/lutein/zeaxan (PRESERVISION AREDS-2 ORAL) Take by mouth once daily.    Provider, Historical       Review of Systems:  Review of Systems      Advance Care Planning     Date: 08/02/2024    Power of   I initiated the process of voluntary advance care planning today and explained the importance of this process to the patient.  I introduced the concept of advance directives to the patient, as well. Then the patient received detailed information about the importance of designating a Health Care Power of  (HCPOA). He was also instructed to communicate with this person about their wishes for future healthcare, should he become sick and lose decision-making capacity. The patient has previously appointed a HCPOA. After our discussion, the patient has decided to complete a HCPOA and has appointed his significant other, health care agent:  on file  & health care agent number:  on file . I encouraged him to communicate with this person about their wishes for future healthcare, should he become sick and lose decision-making capacity.      A total of 10 min was spent on advance care planning, goals of care discussion, emotional support, formulating and communicating prognosis and exploring burden/benefit of various approaches of treatment. This discussion occurred on a fully voluntary basis with the verbal consent of the patient and/or family.             PHYSICAL EXAM     /80 (BP Location: Right arm, Patient Position: Sitting, BP Method: Medium (Manual))   Pulse 64   Ht 6' (1.829 m)   Wt 94.3 kg (207 lb 12.5 oz)   SpO2 97%   BMI 28.18 kg/m²     GEN - A+OX4, NAD   HEENT - PERRL, EOMI, OP  clear  Neck - No thyromegaly or cervical LAD. No thyroid masses felt.  CV - RRR, no m/r   Chest - CTAB, no wheezing or rhonchi  Abd - S/NT/ND/+BS.   Ext - 2+BDP and radial pulses. No C/C/E.  Skin - No rash.    LABS         ASSESSMENT/PLAN     Gilberto Lee is a 83 y.o. male with  1. Chronic fatigue  Still unclear     2. Mild major depression  Stable   Overview:  PHQ 9 6/27  On wellbutrin and well treated. Continue  Denies SI/HI       3. Hyperparathyroidism, unspecified  Overview:   in 5/26/2017.  ca 10.8.   his of calcium high.  ca 10.7 in 8/2016, 11/2016 wnl . refer to endo, s/p parathyroidectomy. Now labs are normal.   Asymptomatic       4. Essential hypertension  BP is well controlled   Continue with current management    5. Mixed hyperlipidemia  Overview:  Lipid panel at goal. Continue simvastatin.       6. BPH without obstruction/lower urinary tract symptoms - s/p turp - last psa in june of 2016 1.0  Overview:  On flomax with good results      7. Migraine without aura and responsive to treatment  Overview:  > 50 years, on CCB.10/2015: d/c CCB since 7/2015  2/2 fatigue.2/2016:  on sumatriptan PRN.  consider add beta blocker for prophylaxis. see cardiology first.5/2016: on claritin. improved. advise  pt to see ENT if worsening. can not tolerate betablocker.on imitrex and feeling better.   Says headaches have gotten much better with age.       8. Hypercholesteremi    9. Thyroid disease  wnl           WORRY SCORE 3    RTC in 1 months, sooner if needed and depending on labs.    Mercedes Clemens MD  Board Certified Internist/Geriatrician  [facility] ([place])

## 2024-08-05 ENCOUNTER — TELEPHONE (OUTPATIENT)
Dept: ELECTROPHYSIOLOGY | Facility: CLINIC | Age: 84
End: 2024-08-05
Payer: MEDICARE

## 2024-08-05 ENCOUNTER — PATIENT MESSAGE (OUTPATIENT)
Dept: ELECTROPHYSIOLOGY | Facility: CLINIC | Age: 84
End: 2024-08-05
Payer: MEDICARE

## 2024-08-05 DIAGNOSIS — Z01.812 PRE-PROCEDURE LAB EXAM: ICD-10-CM

## 2024-08-05 DIAGNOSIS — I49.9 CARDIAC ARRHYTHMIA, UNSPECIFIED CARDIAC ARRHYTHMIA TYPE: ICD-10-CM

## 2024-08-05 DIAGNOSIS — I45.2 BIFASCICULAR BUNDLE BRANCH BLOCK: Primary | ICD-10-CM

## 2024-08-05 DIAGNOSIS — R00.1 BRADYCARDIA: ICD-10-CM

## 2024-08-05 PROBLEM — I49.8 CHRONOTROPIC INCOMPETENCE WITH SINUS NODE DYSFUNCTION: Status: ACTIVE | Noted: 2024-08-05

## 2024-08-06 ENCOUNTER — HOSPITAL ENCOUNTER (OUTPATIENT)
Facility: HOSPITAL | Age: 84
Discharge: HOME | End: 2024-08-06
Attending: INTERNAL MEDICINE | Admitting: INTERNAL MEDICINE
Payer: MEDICARE

## 2024-08-06 ENCOUNTER — TELEPHONE (OUTPATIENT)
Dept: ELECTROPHYSIOLOGY | Facility: CLINIC | Age: 84
End: 2024-08-06
Payer: MEDICARE

## 2024-08-06 ENCOUNTER — ANESTHESIA EVENT (OUTPATIENT)
Dept: MEDSURG UNIT | Facility: HOSPITAL | Age: 84
End: 2024-08-06
Payer: MEDICARE

## 2024-08-06 ENCOUNTER — ANESTHESIA (OUTPATIENT)
Dept: MEDSURG UNIT | Facility: HOSPITAL | Age: 84
End: 2024-08-06
Payer: MEDICARE

## 2024-08-06 VITALS
SYSTOLIC BLOOD PRESSURE: 156 MMHG | BODY MASS INDEX: 26.31 KG/M2 | DIASTOLIC BLOOD PRESSURE: 84 MMHG | TEMPERATURE: 98 F | HEIGHT: 74 IN | HEART RATE: 71 BPM | WEIGHT: 205 LBS | RESPIRATION RATE: 18 BRPM | OXYGEN SATURATION: 96 %

## 2024-08-06 DIAGNOSIS — Z95.9 CARDIAC DEVICE IN SITU: ICD-10-CM

## 2024-08-06 DIAGNOSIS — R00.1 BRADYCARDIA: Primary | ICD-10-CM

## 2024-08-06 DIAGNOSIS — I49.9 ARRHYTHMIA: ICD-10-CM

## 2024-08-06 DIAGNOSIS — I45.2 BIFASCICULAR BUNDLE BRANCH BLOCK: ICD-10-CM

## 2024-08-06 LAB
OHS QRS DURATION: 146 MS
OHS QTC CALCULATION: 436 MS

## 2024-08-06 PROCEDURE — C1898 LEAD, PMKR, OTHER THAN TRANS: HCPCS | Performed by: INTERNAL MEDICINE

## 2024-08-06 PROCEDURE — 93010 ELECTROCARDIOGRAM REPORT: CPT | Mod: ,,, | Performed by: STUDENT IN AN ORGANIZED HEALTH CARE EDUCATION/TRAINING PROGRAM

## 2024-08-06 PROCEDURE — 33208 INSRT HEART PM ATRIAL & VENT: CPT | Mod: KX | Performed by: INTERNAL MEDICINE

## 2024-08-06 PROCEDURE — C1785 PMKR, DUAL, RATE-RESP: HCPCS | Performed by: INTERNAL MEDICINE

## 2024-08-06 PROCEDURE — 25000003 PHARM REV CODE 250: Performed by: NURSE ANESTHETIST, CERTIFIED REGISTERED

## 2024-08-06 PROCEDURE — 25000003 PHARM REV CODE 250: Performed by: INTERNAL MEDICINE

## 2024-08-06 PROCEDURE — C1894 INTRO/SHEATH, NON-LASER: HCPCS | Performed by: INTERNAL MEDICINE

## 2024-08-06 PROCEDURE — 37000009 HC ANESTHESIA EA ADD 15 MINS: Performed by: INTERNAL MEDICINE

## 2024-08-06 PROCEDURE — 63600175 PHARM REV CODE 636 W HCPCS: Performed by: NURSE ANESTHETIST, CERTIFIED REGISTERED

## 2024-08-06 PROCEDURE — 37000008 HC ANESTHESIA 1ST 15 MINUTES: Performed by: INTERNAL MEDICINE

## 2024-08-06 PROCEDURE — 33208 INSRT HEART PM ATRIAL & VENT: CPT | Mod: 22,KX,, | Performed by: INTERNAL MEDICINE

## 2024-08-06 PROCEDURE — 25000003 PHARM REV CODE 250: Performed by: NURSE PRACTITIONER

## 2024-08-06 PROCEDURE — 63600175 PHARM REV CODE 636 W HCPCS: Mod: JZ,JG | Performed by: INTERNAL MEDICINE

## 2024-08-06 PROCEDURE — 93005 ELECTROCARDIOGRAM TRACING: CPT

## 2024-08-06 PROCEDURE — 93010 ELECTROCARDIOGRAM REPORT: CPT | Mod: ,,, | Performed by: INTERNAL MEDICINE

## 2024-08-06 DEVICE — IMPLANTABLE DEVICE
Type: IMPLANTABLE DEVICE | Site: HEART | Status: FUNCTIONAL
Brand: SOLIA

## 2024-08-06 DEVICE — IMPLANTABLE DEVICE
Type: IMPLANTABLE DEVICE | Site: HEART | Status: FUNCTIONAL
Brand: EDORA 8 DR-T

## 2024-08-06 RX ORDER — LIDOCAINE HYDROCHLORIDE 20 MG/ML
INJECTION, SOLUTION EPIDURAL; INFILTRATION; INTRACAUDAL; PERINEURAL
Status: DISCONTINUED | OUTPATIENT
Start: 2024-08-06 | End: 2024-08-06 | Stop reason: HOSPADM

## 2024-08-06 RX ORDER — PROPOFOL 10 MG/ML
VIAL (ML) INTRAVENOUS
Status: DISCONTINUED | OUTPATIENT
Start: 2024-08-06 | End: 2024-08-06

## 2024-08-06 RX ORDER — DEXMEDETOMIDINE HYDROCHLORIDE 100 UG/ML
INJECTION, SOLUTION INTRAVENOUS
Status: DISCONTINUED | OUTPATIENT
Start: 2024-08-06 | End: 2024-08-06

## 2024-08-06 RX ORDER — BUPIVACAINE HYDROCHLORIDE 2.5 MG/ML
INJECTION, SOLUTION EPIDURAL; INFILTRATION; INTRACAUDAL
Status: DISCONTINUED | OUTPATIENT
Start: 2024-08-06 | End: 2024-08-06 | Stop reason: HOSPADM

## 2024-08-06 RX ORDER — ONDANSETRON HYDROCHLORIDE 2 MG/ML
INJECTION, SOLUTION INTRAVENOUS
Status: DISCONTINUED | OUTPATIENT
Start: 2024-08-06 | End: 2024-08-06

## 2024-08-06 RX ORDER — ACETAMINOPHEN 325 MG/1
650 TABLET ORAL EVERY 4 HOURS PRN
Status: DISCONTINUED | OUTPATIENT
Start: 2024-08-06 | End: 2024-08-06 | Stop reason: HOSPADM

## 2024-08-06 RX ORDER — DOXYCYCLINE 100 MG/1
100 CAPSULE ORAL 2 TIMES DAILY
Qty: 10 CAPSULE | Refills: 0 | Status: SHIPPED | OUTPATIENT
Start: 2024-08-06 | End: 2024-08-11

## 2024-08-06 RX ORDER — SODIUM CHLORIDE 0.9 G/100ML
IRRIGANT IRRIGATION
Status: DISCONTINUED | OUTPATIENT
Start: 2024-08-06 | End: 2024-08-06 | Stop reason: HOSPADM

## 2024-08-06 RX ORDER — GLUCAGON 1 MG
1 KIT INJECTION
Status: DISCONTINUED | OUTPATIENT
Start: 2024-08-06 | End: 2024-08-06 | Stop reason: HOSPADM

## 2024-08-06 RX ORDER — LIDOCAINE HYDROCHLORIDE 20 MG/ML
INJECTION INTRAVENOUS
Status: DISCONTINUED | OUTPATIENT
Start: 2024-08-06 | End: 2024-08-06

## 2024-08-06 RX ORDER — DEXAMETHASONE SODIUM PHOSPHATE 4 MG/ML
INJECTION, SOLUTION INTRA-ARTICULAR; INTRALESIONAL; INTRAMUSCULAR; INTRAVENOUS; SOFT TISSUE
Status: DISCONTINUED | OUTPATIENT
Start: 2024-08-06 | End: 2024-08-06

## 2024-08-06 RX ORDER — ONDANSETRON HYDROCHLORIDE 2 MG/ML
4 INJECTION, SOLUTION INTRAVENOUS DAILY PRN
Status: DISCONTINUED | OUTPATIENT
Start: 2024-08-06 | End: 2024-08-06 | Stop reason: HOSPADM

## 2024-08-06 RX ORDER — CEFAZOLIN SODIUM 1 G/3ML
INJECTION, POWDER, FOR SOLUTION INTRAMUSCULAR; INTRAVENOUS
Status: DISCONTINUED | OUTPATIENT
Start: 2024-08-06 | End: 2024-08-06

## 2024-08-06 RX ORDER — FENTANYL CITRATE 50 UG/ML
25 INJECTION, SOLUTION INTRAMUSCULAR; INTRAVENOUS EVERY 5 MIN PRN
Status: DISCONTINUED | OUTPATIENT
Start: 2024-08-06 | End: 2024-08-06 | Stop reason: HOSPADM

## 2024-08-06 RX ORDER — FENTANYL CITRATE 50 UG/ML
INJECTION, SOLUTION INTRAMUSCULAR; INTRAVENOUS
Status: DISCONTINUED | OUTPATIENT
Start: 2024-08-06 | End: 2024-08-06

## 2024-08-06 RX ORDER — VANCOMYCIN HYDROCHLORIDE 1 G/20ML
INJECTION, POWDER, LYOPHILIZED, FOR SOLUTION INTRAVENOUS
Status: DISCONTINUED | OUTPATIENT
Start: 2024-08-06 | End: 2024-08-06 | Stop reason: HOSPADM

## 2024-08-06 RX ADMIN — ONDANSETRON 4 MG: 2 INJECTION INTRAMUSCULAR; INTRAVENOUS at 02:08

## 2024-08-06 RX ADMIN — PHENYLEPHRINE HYDROCHLORIDE 0.5 MCG/KG/MIN: 10 INJECTION INTRAVENOUS at 12:08

## 2024-08-06 RX ADMIN — DEXMEDETOMIDINE 4 MCG: 200 INJECTION, SOLUTION INTRAVENOUS at 12:08

## 2024-08-06 RX ADMIN — FENTANYL CITRATE 25 MCG: 50 INJECTION, SOLUTION INTRAMUSCULAR; INTRAVENOUS at 12:08

## 2024-08-06 RX ADMIN — DEXAMETHASONE SODIUM PHOSPHATE 4 MG: 4 INJECTION, SOLUTION INTRAMUSCULAR; INTRAVENOUS at 12:08

## 2024-08-06 RX ADMIN — FENTANYL CITRATE 25 MCG: 50 INJECTION, SOLUTION INTRAMUSCULAR; INTRAVENOUS at 02:08

## 2024-08-06 RX ADMIN — CEFAZOLIN 2 G: 330 INJECTION, POWDER, FOR SOLUTION INTRAMUSCULAR; INTRAVENOUS at 12:08

## 2024-08-06 RX ADMIN — SODIUM CHLORIDE: 9 INJECTION, SOLUTION INTRAVENOUS at 12:08

## 2024-08-06 RX ADMIN — FENTANYL CITRATE 25 MCG: 50 INJECTION, SOLUTION INTRAMUSCULAR; INTRAVENOUS at 01:08

## 2024-08-06 RX ADMIN — ACETAMINOPHEN 650 MG: 325 TABLET ORAL at 04:08

## 2024-08-06 RX ADMIN — LIDOCAINE HYDROCHLORIDE 50 MG: 20 INJECTION INTRAVENOUS at 12:08

## 2024-08-06 RX ADMIN — PROPOFOL 40 MG: 10 INJECTION, EMULSION INTRAVENOUS at 12:08

## 2024-08-07 LAB
OHS QRS DURATION: 106 MS
OHS QTC CALCULATION: 431 MS

## 2024-08-09 ENCOUNTER — PATIENT MESSAGE (OUTPATIENT)
Dept: CARDIOLOGY | Facility: CLINIC | Age: 84
End: 2024-08-09
Payer: MEDICARE

## 2024-08-09 ENCOUNTER — TELEPHONE (OUTPATIENT)
Dept: ELECTROPHYSIOLOGY | Facility: CLINIC | Age: 84
End: 2024-08-09
Payer: MEDICARE

## 2024-08-09 ENCOUNTER — HOSPITAL ENCOUNTER (OUTPATIENT)
Facility: HOSPITAL | Age: 84
Discharge: HOME | End: 2024-08-10
Attending: STUDENT IN AN ORGANIZED HEALTH CARE EDUCATION/TRAINING PROGRAM | Admitting: INTERNAL MEDICINE
Payer: MEDICARE

## 2024-08-09 DIAGNOSIS — R79.89 ELEVATED TROPONIN: Primary | ICD-10-CM

## 2024-08-09 DIAGNOSIS — I49.9 ARRHYTHMIA: ICD-10-CM

## 2024-08-09 DIAGNOSIS — Z95.0 PACEMAKER: ICD-10-CM

## 2024-08-09 DIAGNOSIS — R03.0 ELEVATED BLOOD PRESSURE READING: ICD-10-CM

## 2024-08-09 DIAGNOSIS — T82.9XXA PACEMAKER COMPLICATIONS: ICD-10-CM

## 2024-08-09 LAB
ALBUMIN SERPL BCP-MCNC: 4.3 G/DL (ref 3.5–5.2)
ALP SERPL-CCNC: 51 U/L (ref 55–135)
ALT SERPL W/O P-5'-P-CCNC: 16 U/L (ref 10–44)
ANION GAP SERPL CALC-SCNC: 9 MMOL/L (ref 8–16)
AST SERPL-CCNC: 23 U/L (ref 10–40)
BASOPHILS # BLD AUTO: 0.06 K/UL (ref 0–0.2)
BASOPHILS NFR BLD: 0.8 % (ref 0–1.9)
BILIRUB SERPL-MCNC: 0.5 MG/DL (ref 0.1–1)
BUN SERPL-MCNC: 18 MG/DL (ref 8–23)
CALCIUM SERPL-MCNC: 9.4 MG/DL (ref 8.7–10.5)
CHLORIDE SERPL-SCNC: 103 MMOL/L (ref 95–110)
CO2 SERPL-SCNC: 24 MMOL/L (ref 23–29)
CREAT SERPL-MCNC: 0.9 MG/DL (ref 0.5–1.4)
DIFFERENTIAL METHOD BLD: ABNORMAL
EOSINOPHIL # BLD AUTO: 0.2 K/UL (ref 0–0.5)
EOSINOPHIL NFR BLD: 2.1 % (ref 0–8)
ERYTHROCYTE [DISTWIDTH] IN BLOOD BY AUTOMATED COUNT: 13.1 % (ref 11.5–14.5)
EST. GFR  (NO RACE VARIABLE): >60 ML/MIN/1.73 M^2
GLUCOSE SERPL-MCNC: 93 MG/DL (ref 70–110)
HCT VFR BLD AUTO: 41.8 % (ref 40–54)
HGB BLD-MCNC: 14.4 G/DL (ref 14–18)
IMM GRANULOCYTES # BLD AUTO: 0.02 K/UL (ref 0–0.04)
IMM GRANULOCYTES NFR BLD AUTO: 0.3 % (ref 0–0.5)
LYMPHOCYTES # BLD AUTO: 1.7 K/UL (ref 1–4.8)
LYMPHOCYTES NFR BLD: 20.9 % (ref 18–48)
MCH RBC QN AUTO: 31.5 PG (ref 27–31)
MCHC RBC AUTO-ENTMCNC: 34.4 G/DL (ref 32–36)
MCV RBC AUTO: 92 FL (ref 82–98)
MONOCYTES # BLD AUTO: 0.7 K/UL (ref 0.3–1)
MONOCYTES NFR BLD: 9.3 % (ref 4–15)
NEUTROPHILS # BLD AUTO: 5.3 K/UL (ref 1.8–7.7)
NEUTROPHILS NFR BLD: 66.6 % (ref 38–73)
NRBC BLD-RTO: 0 /100 WBC
PLATELET # BLD AUTO: 223 K/UL (ref 150–450)
PMV BLD AUTO: 8.8 FL (ref 9.2–12.9)
POTASSIUM SERPL-SCNC: 4 MMOL/L (ref 3.5–5.1)
PROT SERPL-MCNC: 7.3 G/DL (ref 6–8.4)
RBC # BLD AUTO: 4.57 M/UL (ref 4.6–6.2)
SODIUM SERPL-SCNC: 136 MMOL/L (ref 136–145)
TROPONIN I SERPL HS-MCNC: 68.8 PG/ML (ref 0–14.9)
WBC # BLD AUTO: 8 K/UL (ref 3.9–12.7)

## 2024-08-09 PROCEDURE — 93010 ELECTROCARDIOGRAM REPORT: CPT | Mod: 76,,, | Performed by: GENERAL PRACTICE

## 2024-08-09 PROCEDURE — 93010 ELECTROCARDIOGRAM REPORT: CPT | Mod: ,,, | Performed by: GENERAL PRACTICE

## 2024-08-09 PROCEDURE — 93005 ELECTROCARDIOGRAM TRACING: CPT | Performed by: GENERAL PRACTICE

## 2024-08-09 PROCEDURE — 25000003 PHARM REV CODE 250: Performed by: STUDENT IN AN ORGANIZED HEALTH CARE EDUCATION/TRAINING PROGRAM

## 2024-08-09 PROCEDURE — 84484 ASSAY OF TROPONIN QUANT: CPT | Performed by: NURSE PRACTITIONER

## 2024-08-09 PROCEDURE — 83880 ASSAY OF NATRIURETIC PEPTIDE: CPT | Performed by: NURSE PRACTITIONER

## 2024-08-09 PROCEDURE — 84484 ASSAY OF TROPONIN QUANT: CPT | Mod: 91 | Performed by: STUDENT IN AN ORGANIZED HEALTH CARE EDUCATION/TRAINING PROGRAM

## 2024-08-09 PROCEDURE — 36415 COLL VENOUS BLD VENIPUNCTURE: CPT | Performed by: NURSE PRACTITIONER

## 2024-08-09 PROCEDURE — 85025 COMPLETE CBC W/AUTO DIFF WBC: CPT | Performed by: NURSE PRACTITIONER

## 2024-08-09 PROCEDURE — 80053 COMPREHEN METABOLIC PANEL: CPT | Performed by: NURSE PRACTITIONER

## 2024-08-09 PROCEDURE — 99285 EMERGENCY DEPT VISIT HI MDM: CPT | Mod: 25

## 2024-08-09 RX ORDER — METOPROLOL SUCCINATE 25 MG/1
25 TABLET, EXTENDED RELEASE ORAL 2 TIMES DAILY
Status: DISCONTINUED | OUTPATIENT
Start: 2024-08-09 | End: 2024-08-10 | Stop reason: HOSPADM

## 2024-08-09 RX ORDER — LOSARTAN POTASSIUM 25 MG/1
50 TABLET ORAL DAILY
Status: DISCONTINUED | OUTPATIENT
Start: 2024-08-10 | End: 2024-08-09

## 2024-08-09 RX ORDER — ASPIRIN 325 MG
325 TABLET ORAL
Status: COMPLETED | OUTPATIENT
Start: 2024-08-09 | End: 2024-08-09

## 2024-08-09 RX ORDER — LOSARTAN POTASSIUM 50 MG/1
50 TABLET ORAL DAILY
Status: DISCONTINUED | OUTPATIENT
Start: 2024-08-09 | End: 2024-08-10 | Stop reason: HOSPADM

## 2024-08-09 RX ADMIN — ASPIRIN 325 MG ORAL TABLET 325 MG: 325 PILL ORAL at 11:08

## 2024-08-09 RX ADMIN — LOSARTAN POTASSIUM 50 MG: 50 TABLET, FILM COATED ORAL at 07:08

## 2024-08-09 RX ADMIN — METOPROLOL SUCCINATE 25 MG: 25 TABLET, EXTENDED RELEASE ORAL at 11:08

## 2024-08-09 NOTE — FIRST PROVIDER EVALUATION
" Emergency Department TeleTriage Encounter Note      CHIEF COMPLAINT    Chief Complaint   Patient presents with    Hypertension     Pt denies any symptoms associated with high bp.  Pt says he just noticed his bp was high       VITAL SIGNS   Initial Vitals [08/09/24 1556]   BP Pulse Resp Temp SpO2   (!) 170/94 72 18 98.1 °F (36.7 °C) 95 %      MAP       --            ALLERGIES    Review of patient's allergies indicates:   Allergen Reactions    Demerol  [meperidine]      Agitation  Other reaction(s): Unknown       PROVIDER TRIAGE NOTE  This is a teletriage evaluation of a 83 y.o. male presenting to the ED complaining of elevated BP and HR "irregular- skipping beats" today. Pt had a pacemaker put in this week. Denies CP and SOB.     Initial orders will be placed and care will be transferred to an alternate provider when patient is roomed for a full evaluation. Any additional orders and the final disposition will be determined by that provider.         ORDERS  Labs Reviewed   CBC W/ AUTO DIFFERENTIAL   COMPREHENSIVE METABOLIC PANEL   TROPONIN I HIGH SENSITIVITY       ED Orders (720h ago, onward)      Start Ordered     Status Ordering Provider    08/09/24 1800 08/09/24 1603  Vital Signs  Every 2 hours         Ordered MICHELLE KNOWLES N.    08/09/24 1604 08/09/24 1603  EKG 12-lead  Once         Ordered MICHELLE KNOWLES N.    08/09/24 1604 08/09/24 1603  CBC auto differential  STAT         Ordered MICHELLE KNOWLES N.    08/09/24 1604 08/09/24 1603  Comprehensive metabolic panel  STAT         Ordered MICHELLE KNOWLES N.    08/09/24 1604 08/09/24 1603  Troponin I High Sensitivity  STAT         Ordered MICHELLE KNOWLES N.    08/09/24 1604 08/09/24 1603  Cardiac Monitoring - Adult  Continuous        Comments: Notify Physician If:    Ordered MICHELLE KNOWLES N.    08/09/24 1604 08/09/24 1603  Pulse Oximetry Continuous  Continuous         Ordered MICHELLE KNOWLES N.    08/09/24 1604 " 08/09/24 1603  Insert Saline lock IV  Once         Ordered MICHELLE KNOWLES              Virtual Visit Note: The provider triage portion of this emergency department evaluation and documentation was performed via [employer], a HIPAA-compliant telemedicine application, in concert with a tele-presenter in the room. A face to face patient evaluation with one of my colleagues will occur once the patient is placed in an emergency department room.      DISCLAIMER: This note was prepared with [employer]o voice recognition transcription software. Garbled syntax, mangled pronouns, and other bizarre constructions may be attributed to that software system.

## 2024-08-09 NOTE — Clinical Note
Diagnosis: Elevated troponin [008398]   Future Attending Provider: BOSTON CALZADA [[id number]]   Place in Observation: [facility] [[id number]]

## 2024-08-09 NOTE — FIRST PROVIDER EVALUATION
"Medical screening examination initiated.  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  Patient reports his blood pressure is 170/115 at home.  It is 170/94.  He had pacemaker placed on Tuesday Dr. Gonzalez at [facility].  He is reporting headache generalized malaise.  Onset today.    Vitals:    08/09/24 1556   BP: (!) 170/94   BP Location: Left arm   Pulse: 72   Resp: 18   Temp: 98.1 °F (36.7 °C)   TempSrc: Oral   SpO2: 95%   Weight: 93 kg (205 lb)   Height: 6' 2" (1.88 m)       Pertinent physical exam:  Heart rate irregular lungs are clear to auscultation he was awake alert and oriented    Brief workup plan:  Cardiac workup    Preliminary workup initiated; this workup will be continued and followed by the physician or advanced practice provider that is assigned to the patient when roomed.  "

## 2024-08-10 VITALS
SYSTOLIC BLOOD PRESSURE: 150 MMHG | BODY MASS INDEX: 26.2 KG/M2 | RESPIRATION RATE: 15 BRPM | WEIGHT: 204.13 LBS | OXYGEN SATURATION: 95 % | HEART RATE: 59 BPM | TEMPERATURE: 98 F | DIASTOLIC BLOOD PRESSURE: 85 MMHG | HEIGHT: 74 IN

## 2024-08-10 PROBLEM — R79.89 ELEVATED TROPONIN: Status: RESOLVED | Noted: 2017-06-22 | Resolved: 2024-08-10

## 2024-08-10 LAB
ALBUMIN SERPL BCP-MCNC: 4 G/DL (ref 3.5–5.2)
ALP SERPL-CCNC: 45 U/L (ref 55–135)
ALT SERPL W/O P-5'-P-CCNC: 15 U/L (ref 10–44)
ANION GAP SERPL CALC-SCNC: 7 MMOL/L (ref 8–16)
AST SERPL-CCNC: 26 U/L (ref 10–40)
BASOPHILS # BLD AUTO: 0.06 K/UL (ref 0–0.2)
BASOPHILS NFR BLD: 0.8 % (ref 0–1.9)
BILIRUB SERPL-MCNC: 0.6 MG/DL (ref 0.1–1)
BNP SERPL-MCNC: 67 PG/ML (ref 0–99)
BUN SERPL-MCNC: 15 MG/DL (ref 8–23)
CALCIUM SERPL-MCNC: 9.1 MG/DL (ref 8.7–10.5)
CHLORIDE SERPL-SCNC: 102 MMOL/L (ref 95–110)
CHOLEST SERPL-MCNC: 156 MG/DL (ref 120–199)
CHOLEST/HDLC SERPL: 2.6 {RATIO} (ref 2–5)
CO2 SERPL-SCNC: 28 MMOL/L (ref 23–29)
CREAT SERPL-MCNC: 1.1 MG/DL (ref 0.5–1.4)
DIFFERENTIAL METHOD BLD: ABNORMAL
EOSINOPHIL # BLD AUTO: 0.2 K/UL (ref 0–0.5)
EOSINOPHIL NFR BLD: 3.2 % (ref 0–8)
ERYTHROCYTE [DISTWIDTH] IN BLOOD BY AUTOMATED COUNT: 12.9 % (ref 11.5–14.5)
EST. GFR  (NO RACE VARIABLE): >60 ML/MIN/1.73 M^2
GLUCOSE SERPL-MCNC: 101 MG/DL (ref 70–110)
HCT VFR BLD AUTO: 41.8 % (ref 40–54)
HDLC SERPL-MCNC: 60 MG/DL (ref 40–75)
HDLC SERPL: 38.5 % (ref 20–50)
HGB BLD-MCNC: 14 G/DL (ref 14–18)
IMM GRANULOCYTES # BLD AUTO: 0.02 K/UL (ref 0–0.04)
IMM GRANULOCYTES NFR BLD AUTO: 0.3 % (ref 0–0.5)
LDLC SERPL CALC-MCNC: 73.2 MG/DL (ref 63–159)
LYMPHOCYTES # BLD AUTO: 2.1 K/UL (ref 1–4.8)
LYMPHOCYTES NFR BLD: 28 % (ref 18–48)
MAGNESIUM SERPL-MCNC: 2.2 MG/DL (ref 1.6–2.6)
MCH RBC QN AUTO: 31.4 PG (ref 27–31)
MCHC RBC AUTO-ENTMCNC: 33.5 G/DL (ref 32–36)
MCV RBC AUTO: 94 FL (ref 82–98)
MONOCYTES # BLD AUTO: 0.9 K/UL (ref 0.3–1)
MONOCYTES NFR BLD: 11.5 % (ref 4–15)
NEUTROPHILS # BLD AUTO: 4.3 K/UL (ref 1.8–7.7)
NEUTROPHILS NFR BLD: 56.2 % (ref 38–73)
NONHDLC SERPL-MCNC: 96 MG/DL
NRBC BLD-RTO: 0 /100 WBC
OHS QRS DURATION: 62 MS
OHS QTC CALCULATION: 429 MS
PHOSPHATE SERPL-MCNC: 3.7 MG/DL (ref 2.7–4.5)
PLATELET # BLD AUTO: 215 K/UL (ref 150–450)
PMV BLD AUTO: 8.9 FL (ref 9.2–12.9)
POTASSIUM SERPL-SCNC: 4.1 MMOL/L (ref 3.5–5.1)
PROT SERPL-MCNC: 7 G/DL (ref 6–8.4)
RBC # BLD AUTO: 4.46 M/UL (ref 4.6–6.2)
SODIUM SERPL-SCNC: 137 MMOL/L (ref 136–145)
TRIGL SERPL-MCNC: 114 MG/DL (ref 30–150)
TROPONIN I SERPL HS-MCNC: 49.3 PG/ML (ref 0–14.9)
TROPONIN I SERPL HS-MCNC: 58.3 PG/ML (ref 0–14.9)
WBC # BLD AUTO: 7.56 K/UL (ref 3.9–12.7)

## 2024-08-10 PROCEDURE — 80053 COMPREHEN METABOLIC PANEL: CPT | Performed by: NURSE PRACTITIONER

## 2024-08-10 PROCEDURE — 25000003 PHARM REV CODE 250: Performed by: STUDENT IN AN ORGANIZED HEALTH CARE EDUCATION/TRAINING PROGRAM

## 2024-08-10 PROCEDURE — 80061 LIPID PANEL: CPT | Performed by: NURSE PRACTITIONER

## 2024-08-10 PROCEDURE — G0378 HOSPITAL OBSERVATION PER HR: HCPCS

## 2024-08-10 PROCEDURE — 85025 COMPLETE CBC W/AUTO DIFF WBC: CPT | Performed by: NURSE PRACTITIONER

## 2024-08-10 PROCEDURE — 99900035 HC TECH TIME PER 15 MIN (STAT)

## 2024-08-10 PROCEDURE — 94760 N-INVAS EAR/PLS OXIMETRY 1: CPT

## 2024-08-10 PROCEDURE — 36415 COLL VENOUS BLD VENIPUNCTURE: CPT | Performed by: NURSE PRACTITIONER

## 2024-08-10 PROCEDURE — 84100 ASSAY OF PHOSPHORUS: CPT | Performed by: NURSE PRACTITIONER

## 2024-08-10 PROCEDURE — 83735 ASSAY OF MAGNESIUM: CPT | Performed by: NURSE PRACTITIONER

## 2024-08-10 PROCEDURE — 25000003 PHARM REV CODE 250: Performed by: NURSE PRACTITIONER

## 2024-08-10 PROCEDURE — 84484 ASSAY OF TROPONIN QUANT: CPT | Performed by: NURSE PRACTITIONER

## 2024-08-10 RX ORDER — SODIUM,POTASSIUM PHOSPHATES 280-250MG
2 POWDER IN PACKET (EA) ORAL
Status: DISCONTINUED | OUTPATIENT
Start: 2024-08-10 | End: 2024-08-10 | Stop reason: HOSPADM

## 2024-08-10 RX ORDER — METOPROLOL SUCCINATE 25 MG/1
25 TABLET, EXTENDED RELEASE ORAL 2 TIMES DAILY
Qty: 180 TABLET | Refills: 3 | Status: SHIPPED | OUTPATIENT
Start: 2024-08-10 | End: 2025-08-10

## 2024-08-10 RX ORDER — ONDANSETRON HYDROCHLORIDE 2 MG/ML
4 INJECTION, SOLUTION INTRAVENOUS EVERY 6 HOURS PRN
Status: DISCONTINUED | OUTPATIENT
Start: 2024-08-10 | End: 2024-08-10 | Stop reason: HOSPADM

## 2024-08-10 RX ORDER — ACETAMINOPHEN 325 MG/1
650 TABLET ORAL EVERY 4 HOURS PRN
Status: DISCONTINUED | OUTPATIENT
Start: 2024-08-10 | End: 2024-08-10 | Stop reason: HOSPADM

## 2024-08-10 RX ORDER — LOSARTAN POTASSIUM 25 MG/1
25 TABLET ORAL DAILY
Status: DISCONTINUED | OUTPATIENT
Start: 2024-08-10 | End: 2024-08-10

## 2024-08-10 RX ORDER — SODIUM CHLORIDE 0.9 % (FLUSH) 0.9 %
10 SYRINGE (ML) INJECTION
Status: DISCONTINUED | OUTPATIENT
Start: 2024-08-10 | End: 2024-08-10 | Stop reason: HOSPADM

## 2024-08-10 RX ORDER — NAPROXEN SODIUM 220 MG/1
81 TABLET, FILM COATED ORAL DAILY
Status: DISCONTINUED | OUTPATIENT
Start: 2024-08-10 | End: 2024-08-10 | Stop reason: HOSPADM

## 2024-08-10 RX ORDER — ATORVASTATIN CALCIUM 10 MG/1
10 TABLET, FILM COATED ORAL DAILY
Status: DISCONTINUED | OUTPATIENT
Start: 2024-08-10 | End: 2024-08-10 | Stop reason: HOSPADM

## 2024-08-10 RX ORDER — LANOLIN ALCOHOL/MO/W.PET/CERES
800 CREAM (GRAM) TOPICAL
Status: DISCONTINUED | OUTPATIENT
Start: 2024-08-10 | End: 2024-08-10 | Stop reason: HOSPADM

## 2024-08-10 RX ORDER — LOSARTAN POTASSIUM 50 MG/1
50 TABLET ORAL DAILY
Qty: 90 TABLET | Refills: 3 | Status: SHIPPED | OUTPATIENT
Start: 2024-08-11 | End: 2025-08-11

## 2024-08-10 RX ORDER — LEVOTHYROXINE SODIUM 25 UG/1
75 TABLET ORAL
Status: DISCONTINUED | OUTPATIENT
Start: 2024-08-10 | End: 2024-08-10 | Stop reason: HOSPADM

## 2024-08-10 RX ADMIN — ASPIRIN 81 MG 81 MG: 81 TABLET ORAL at 07:08

## 2024-08-10 RX ADMIN — LEVOTHYROXINE SODIUM 75 MCG: 0.03 TABLET ORAL at 07:08

## 2024-08-10 RX ADMIN — LOSARTAN POTASSIUM 50 MG: 50 TABLET, FILM COATED ORAL at 07:08

## 2024-08-10 RX ADMIN — METOPROLOL SUCCINATE 25 MG: 25 TABLET, EXTENDED RELEASE ORAL at 08:08

## 2024-08-10 NOTE — ASSESSMENT & PLAN NOTE
Troponin I high sensitivity 68.8  Trend serial troponin  Tele monitoring    Consider cardiology consult.     Chest xray: left chest wall cardiac pacing device present. Cardiac monitoring leads overlie the chest. Cardiac silhouette is mildly prominent, similar to prior examination. There are streaky perihilar and bibasilar interstitial lung markings which could reflect edema or atypical interstitial infection.

## 2024-08-10 NOTE — SUBJECTIVE & OBJECTIVE
Past Medical History:   Diagnosis Date    Arthritis     Atelectasis of left lung 07/2015    Bradycardia 07/17/2015    Calcium blood increased 08/09/2016    Depression     Diastolic dysfunction without heart failure 07/2015    Elevated BUN 08/09/2016    Fatigue 04/29/2015    Hyperglycemia 08/09/2016    Hyperlipidemia     Migraine without aura 10/2015    Prehypertension 11/2016    Testosterone deficiency 04/2015    Thyroid disease        Past Surgical History:   Procedure Laterality Date    A-V CARDIAC PACEMAKER INSERTION N/A 8/6/2024    Procedure: INSERTION, CARDIAC PACEMAKER, DUAL CHAMBER;  Surgeon: Paulino Gonzalez MD;  Location: [facility];  Service: Cardiology;  Laterality: N/A;  Bradycardia, Dual PPM, BIO, MAC, GP, 3 Prep    ACHILLES TENDON SURGERY      APPENDECTOMY      DUPUYTREN CONTRACTURE RELEASE  2014    LUMBAR EPIDURAL INJECTION  2013    PROSTATE SURGERY      SACROILIAC JOINT INJECTION      TONSILLECTOMY      TRANSURETHRAL RESECTION OF PROSTATE         Review of patient's allergies indicates:   Allergen Reactions    Demerol  [meperidine]      Agitation  Other reaction(s): Unknown       No current facility-administered medications on file prior to encounter.     Current Outpatient Medications on File Prior to Encounter   Medication Sig    ARIPiprazole (ABILIFY) 2 MG Tab Take 1 tablet (2 mg total) by mouth once daily.    buPROPion (WELLBUTRIN) 75 MG tablet Take 1 tablet (75 mg total) by mouth 6 (six) times daily. TWO TABLETS IN THE MORNING 2 TABLETS IN THE AFTERNOON TWO TABLETS BY MOUTH IN THE EVENING    CALCIUM CITRATE/VITAMIN D3 (CITRACAL + D ORAL) Take by mouth. Taking 2 tablets by mouth daily    doxycycline (MONODOX) 100 MG capsule Take 1 capsule (100 mg total) by mouth 2 (two) times daily. for 5 days    fluticasone propionate (FLONASE) 50 mcg/actuation nasal spray 1 spray (50 mcg total) by Each Nostril route once daily.    glucosamine-chondroit-vit C-Mn 500-400 mg capsule Take 1 tablet by mouth 3  (three) times daily. 3 Capsule Oral Every day    levothyroxine (SYNTHROID) 75 MCG tablet Take 75 mcg (1 tab) by mouth daily 6 days per week, and skip one day each week.    liothyronine (CYTOMEL) 5 MCG Tab Take 1 tablet (5 mcg total) by mouth 2 (two) times a day.    loratadine (CLARITIN) 10 mg tablet Take 10 mg by mouth once daily.    losartan (COZAAR) 25 MG tablet Take 1 tablet (25 mg total) by mouth once daily.    naproxen sodium (ANAPROX) 220 MG tablet Take 220 mg by mouth 2 (two) times daily with meals. 1 Tablet Oral Twice a day     omega-3 fatty acids 1,000 mg Cap Take 2 capsules by mouth once daily. 1 Capsule Oral Every day    simvastatin (ZOCOR) 20 MG tablet Take 1 tablet (20 mg total) by mouth once daily.    sumatriptan (IMITREX) 100 MG tablet Take 1 tablet (100 mg total) by mouth once as needed for Migraine.    vit C,V-At-jmpju-lutein-zeaxan (PRESERVISION AREDS-2) 250-90-40-1 mg Cap     vit C/E/Zn/coppr/lutein/zeaxan (PRESERVISION AREDS-2 ORAL) Take by mouth once daily.     Family History       Problem Relation (Age of Onset)    COPD Father          Tobacco Use    Smoking status: Former     Current packs/day: 0.00     Types: Cigarettes     Quit date: 1972     Years since quittin.0    Smokeless tobacco: Never   Substance and Sexual Activity    Alcohol use: Yes     Comment: occassional    Drug use: No    Sexual activity: Yes     Review of Systems   Constitutional:  Positive for activity change and fatigue.   Eyes: Negative.    Respiratory: Negative.     Cardiovascular:         Irregular fast heart rate   Gastrointestinal: Negative.    Genitourinary: Negative.    Musculoskeletal: Negative.    Skin:  Positive for color change.        Pacemaker left upper chest wall.   Psychiatric/Behavioral: Negative.       Objective:     Vital Signs (Most Recent):  Temp: 98.1 °F (36.7 °C) (24 1556)  Pulse: 71 (246)  Resp: 14 (24 230)  BP: (!) 148/75 (24)  SpO2: 96 % (24)  Vital Signs (24h Range):  Temp:  [98.1 °F (36.7 °C)] 98.1 °F (36.7 °C)  Pulse:  [61-93] 71  Resp:  [13-18] 14  SpO2:  [95 %-96 %] 96 %  BP: (143-196)/(75-98) 148/75     Weight: 93 kg (205 lb)  Body mass index is 26.32 kg/m².     Physical Exam  Vitals reviewed.   Constitutional:       Appearance: Normal appearance.   HENT:      Head: Normocephalic and atraumatic.      Mouth/Throat:      Mouth: Mucous membranes are moist.   Eyes:      Extraocular Movements: Extraocular movements intact.      Pupils: Pupils are equal, round, and reactive to light.   Cardiovascular:      Rate and Rhythm: Normal rate. Rhythm irregular.      Heart sounds: No murmur heard.  Pulmonary:      Effort: Pulmonary effort is normal. No respiratory distress.      Breath sounds: Normal breath sounds.   Abdominal:      General: Bowel sounds are normal.      Palpations: Abdomen is soft.   Musculoskeletal:         General: Normal range of motion.      Cervical back: Normal range of motion and neck supple.      Right lower leg: No edema.      Left lower leg: No edema.   Skin:     General: Skin is warm and dry.      Capillary Refill: Capillary refill takes less than 2 seconds.      Findings: Ecchymosis present.      Comments: Left chest wall s/p pacemaker implantation   Neurological:      General: No focal deficit present.      Mental Status: He is alert. Mental status is at baseline.   Psychiatric:         Mood and Affect: Mood normal.              CRANIAL NERVES     CN III, IV, VI   Pupils are equal, round, and reactive to light.       Significant Labs: All pertinent labs within the past 24 hours have been reviewed.  Recent Lab Results         08/09/24  1650        Albumin 4.3       ALP 51       ALT 16       Anion Gap 9       AST 23       Baso # 0.06       Basophil % 0.8       BILIRUBIN TOTAL 0.5  Comment: For infants and newborns, interpretation of results should be based  on gestational age, weight and in agreement with  clinical  observations.    Premature Infant recommended reference ranges:  Up to 24 hours.............<8.0 mg/dL  Up to 48 hours............<12.0 mg/dL  3-5 days..................<15.0 mg/dL  6-29 days.................<15.0 mg/dL         BUN 18       Calcium 9.4       Chloride 103       CO2 24       Creatinine 0.9       Differential Method Automated       eGFR >60.0       Eos # 0.2       Eos % 2.1       Glucose 93       Gran # (ANC) 5.3       Gran % 66.6       Hematocrit 41.8       Hemoglobin 14.4       Immature Grans (Abs) 0.02  Comment: Mild elevation in immature granulocytes is non specific and   can be seen in a variety of conditions including stress response,   acute inflammation, trauma and pregnancy. Correlation with other   laboratory and clinical findings is essential.         Immature Granulocytes 0.3       Lymph # 1.7       Lymph % 20.9       MCH 31.5       MCHC 34.4       MCV 92       Mono # 0.7       Mono % 9.3       MPV 8.8       nRBC 0       Platelet Count 223       Potassium 4.0       PROTEIN TOTAL 7.3       RBC 4.57       RDW 13.1       Sodium 136       Troponin I High Sensitivity 68.8  Comment: Troponin results differ between methods. Do not use   results between Troponin methods interchangeably.    Alkaline Phospatase levels above 400 U/L may   cause false positive results.    Access hsTnI should not be used for patients taking   Asfotase mervat (Strensiq).  Critical result TNIHS 68.8 pg/mL called to and read back by Arlin Villarreal  RN/[facility] at 09-Aug-2024 17:[facility]         WBC 8.00               Significant Imaging: I have reviewed all pertinent imaging results/findings within the past 24 hours.  Imaging Results              X-Ray Chest AP Portable (Final result)  Result time 08/09/24 21:40:29      Final result by Ilya Gomez MD (08/09/24 21:40:29)                   Impression:      As above.      Electronically signed by: Ilya Gomez MD  Date:    08/09/2024  Time:    21:40                Narrative:    EXAMINATION:  XR CHEST AP PORTABLE    CLINICAL HISTORY:  Presence of cardiac pacemaker    TECHNIQUE:  Single frontal view of the chest was performed.    COMPARISON:  08/06/2024    FINDINGS:  There is a left chest wall cardiac pacing device present.  Cardiac monitoring leads overlie the chest.  Cardiac silhouette is mildly prominent, similar to prior examination.  There are streaky perihilar and bibasilar interstitial lung markings which could reflect edema or atypical interstitial infection.  This appears mildly increased in conspicuity compared to prior examination.  No significant volume of pleural fluid or pneumothorax identified.  Osseous structures demonstrate mild degenerative changes.

## 2024-08-10 NOTE — ASSESSMENT & PLAN NOTE
Chronic, uncontrolled. Latest blood pressure and vitals reviewed-     Temp:  [98.1 °F (36.7 °C)]   Pulse:  [61-93]   Resp:  [13-18]   BP: (143-196)/(75-98)   SpO2:  [95 %-96 %] .   Home meds for hypertension were reviewed and noted below.   Hypertension Medications               losartan (COZAAR) 25 MG tablet Take 1 tablet (25 mg total) by mouth once daily.            While in the hospital, will manage blood pressure as follows; Continue home antihypertensive regimen    Will utilize p.r.n. blood pressure medication only if patient's blood pressure greater than 180/110 and he develops symptoms such as worsening chest pain or shortness of breath.

## 2024-08-10 NOTE — DISCHARGE SUMMARY
[facility]  Discharge Summary      Patient Name: Gilberto Lee  MRN: [id number]  LELAND: [id number]  Patient Class: OP- Observation  Admission Date: 8/9/2024  Hospital Length of Stay: 0 days  Discharge Date and Time: 8/10/2024 11:01 AM  Attending Physician: No att. 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 Gonzalez on 8/6/24. He presents rto the emergency department with concerns for his blood pressure being elevated and irregular heart rate. Denies active chest pain, shortness of breath, nausea, vomiting, diaphoresis, leg swelling, numbness, and weakness. In the ER, pacemaker interrogation without abnormal findings without. In ER, troponin elevated 68.8, bp elevated. Losartan and metoprolol given in ER. EKG with Paced rhythm with a frequent PVC. No significant electrolyte abnormality. No significant anemia seen. ER physician to speak with Dr. Gonzalez, recommendations given dose of losartan here for blood pressure. Pacemaker was placed by Dr. Gonzalez on 8/6. Discussed pacemaker with Dr. Duarte. He believes it is unlikely that the pacemaker insertion is leading to more frequent PVCs. Recommending initiating metoprolol 25 mg b.i.d. and having him follow-up in electrophysiology Clinic in approximately 2 weeks. Given his elevated troponin. Admit to hospital medicine for further evaluation to rule out for ACS.     Per chart review, patient has had previous episodes of PVCs for which he was previously on a beta blocker.  He was unable to tolerate this secondary to chronotropic incompetence.      * No surgery found *      Hospital Course:   Patient with history of bradycardia status post  pacemaker placement on 08/06, hypertension, hypothyroidism presented with concerns for elevated blood pressure and irregular heart rate.  Discussions were held with patient's electrophysiologist and emergency room doctor, decision was made to start patient on Toprol.  Symptoms improving.  Blood pressure improving.  Patient instructed to follow up outpatient with electrophysiologist and cardiologist.    Physical Exam    Constitutional:       Appearance: Normal appearance.   HENT:      Head: Normocephalic and atraumatic.    Cardiovascular:      Rate and Rhythm: Normal rate.     Heart sounds: No murmur heard.      The admit and discharge were performed at least 8 hours apart on the same calendar today    Goals of Care Treatment Preferences:  Code Status: Full Code    Health care agent: on file  Health care agent number: on file    Living Will: Yes              SDOH Screening:  The patient was screened for utility difficulties, food insecurity, transport difficulties, housing insecurity, and interpersonal safety and there were no concerns identified this admission.     Consults:     No new Assessment & Plan notes have been filed under this hospital service since the last note was generated.  Service: Hospital Medicine    Final Active Diagnoses:    Diagnosis Date Noted POA    Essential hypertension [I10] 07/23/2012 Yes      Problems Resolved During this Admission:    Diagnosis Date Noted Date Resolved POA    PRINCIPAL PROBLEM:  Elevated troponin [R79.89] 06/22/2017 08/10/2024 Yes       Discharged Condition: good    Disposition: Home or Self Care    Follow Up:   Follow-up Information       Mercedes Clemens MD Follow up in 3 day(s).    Specialty: Internal Medicine  Contact information:  [address]  [facility] [address]  [phone]               Wale Calderon MD Follow up in 1 week(s).    Specialties: Interventional Cardiology, Cardiology  Contact information:  [address]  [address]  CARDIOLOGY  [place] [address]  [phone]                           Patient Instructions:      Diet Cardiac     Notify your health care provider if you experience any of the following:  temperature >100.4     Notify your health care provider if you experience any of the following:  persistent nausea and vomiting or diarrhea     Notify your health care provider if you experience any of the following:  severe uncontrolled pain     Notify your health care provider if you experience any of the following:  redness, tenderness, or signs of infection (pain, swelling, redness, odor or green/yellow discharge around incision site)     Activity as tolerated       Significant Diagnostic Studies: Labs: CMP   Recent Labs   Lab 08/09/24  1650 08/10/24  0436    137   K 4.0 4.1    102   CO2 24 28   GLU 93 101   BUN 18 15   CREATININE 0.9 1.1   CALCIUM 9.4 9.1   PROT 7.3 7.0   ALBUMIN 4.3 4.0   BILITOT 0.5 0.6   ALKPHOS 51* 45*   AST 23 26   ALT 16 15   ANIONGAP 9 7*    and CBC   Recent Labs   Lab 08/09/24  1650 08/10/24  0436   WBC 8.00 7.56   HGB 14.4 14.0   HCT 41.8 41.8    215       Pending Diagnostic Studies:       None           Medications:  Reconciled Home Medications:      Medication List        START taking these medications      metoprolol succinate 25 MG 24 hr tablet  Commonly known as: TOPROL-XL  Take 1 tablet (25 mg total) by mouth 2 (two) times daily.            CHANGE how you take these medications      losartan 50 MG tablet  Commonly known as: COZAAR  Take 1 tablet (50 mg total) by mouth once daily.  Start taking on: August 11, 2024  What changed:   medication strength  how much to take            CONTINUE taking these medications      ARIPiprazole 2 MG Tab  Commonly known as: ABILIFY  Take 1 tablet (2 mg total) by mouth once daily.     buPROPion 75 MG tablet  Commonly known as: WELLBUTRIN  Take 1 tablet (75 mg total) by mouth 6 (six) times daily. TWO TABLETS IN THE MORNING 2 TABLETS IN THE  AFTERNOON TWO TABLETS BY MOUTH IN THE EVENING     CITRACAL + D ORAL  Take by mouth. Taking 2 tablets by mouth daily     doxycycline 100 MG capsule  Commonly known as: MONODOX  Take 1 capsule (100 mg total) by mouth 2 (two) times daily. for 5 days     fluticasone propionate 50 mcg/actuation nasal spray  Commonly known as: FLONASE  1 spray (50 mcg total) by Each Nostril route once daily.     glucosamine-chondroit-vit C-Mn 500-400 mg capsule  Take 1 tablet by mouth 3 (three) times daily. 3 Capsule Oral Every day     levothyroxine 75 MCG tablet  Commonly known as: SYNTHROID  Take 75 mcg (1 tab) by mouth daily 6 days per week, and skip one day each week.     liothyronine 5 MCG Tab  Commonly known as: CYTOMEL  Take 1 tablet (5 mcg total) by mouth 2 (two) times a day.     loratadine 10 mg tablet  Commonly known as: CLARITIN  Take 10 mg by mouth once daily.     naproxen sodium 220 MG tablet  Commonly known as: ANAPROX  Take 220 mg by mouth 2 (two) times daily with meals. 1 Tablet Oral Twice a day     omega-3 fatty acids 1,000 mg Cap  Take 2 capsules by mouth once daily. 1 Capsule Oral Every day     * PRESERVISION AREDS-2 250-90-40-1 mg Cap  Generic drug: vit C,B-Ca-zovkq-lutein-zeaxan     * PRESERVISION AREDS-2 ORAL  Take by mouth once daily.     simvastatin 20 MG tablet  Commonly known as: ZOCOR  Take 1 tablet (20 mg total) by mouth once daily.     sumatriptan 100 MG tablet  Commonly known as: IMITREX  Take 1 tablet (100 mg total) by mouth once as needed for Migraine.           * This list has 2 medication(s) that are the same as other medications prescribed for you. Read the directions carefully, and ask your doctor or other care provider to review them with you.                  Indwelling Lines/Drains at time of discharge:   Lines/Drains/Airways       None                   Time spent on the discharge of patient: 33 minutes         Mariza Marley MD  [facility]  [facility]

## 2024-08-10 NOTE — ED PROVIDER NOTES
Encounter Date: 8/9/2024       History     Chief Complaint   Patient presents with    Hypertension     Pt denies any symptoms associated with high bp.  Pt says he just noticed his bp was high     HPI    Gilberto Lee is a 83 y.o. male with a past medical history hypertension, hyperlipidemia, and a bradycardia and bifascicular block status post recent pacemaker placement on 08/06 that presents emergency department with concerns for his blood pressure being elevated.  Patient is concerned that his blood pressure was high and that his heart rate was irregular.  He had a review of his pacemaker device which showed that the device was working properly as of 1:00 a.m. this morning.  Per chart review, patient has had previous episodes of PVCs for which he was previously on a beta blocker.  He was unable to tolerate this secondary to chronotropic incompetence.  Denies active chest pain, shortness of breath, nausea, vomiting, diaphoresis, leg swelling, numbness, and weakness.    Review of patient's allergies indicates:   Allergen Reactions    Demerol  [meperidine]      Agitation  Other reaction(s): Unknown     Past Medical History:   Diagnosis Date    Arthritis     Atelectasis of left lung 07/2015    Bradycardia 07/17/2015    Calcium blood increased 08/09/2016    Depression     Diastolic dysfunction without heart failure 07/2015    Elevated BUN 08/09/2016    Fatigue 04/29/2015    Hyperglycemia 08/09/2016    Hyperlipidemia     Migraine without aura 10/2015    Prehypertension 11/2016    Testosterone deficiency 04/2015    Thyroid disease      Past Surgical History:   Procedure Laterality Date    A-V CARDIAC PACEMAKER INSERTION N/A 8/6/2024    Procedure: INSERTION, CARDIAC PACEMAKER, DUAL CHAMBER;  Surgeon: Paulino Gonzalez MD;  Location: [facility];  Service: Cardiology;  Laterality: N/A;  Bradycardia, Dual PPM, BIO, MAC, GP, 3 Prep    ACHILLES TENDON SURGERY      APPENDECTOMY      DUPUYTREN CONTRACTURE RELEASE  2014     LUMBAR EPIDURAL INJECTION      PROSTATE SURGERY      SACROILIAC JOINT INJECTION      TONSILLECTOMY      TRANSURETHRAL RESECTION OF PROSTATE       Family History   Problem Relation Name Age of Onset    COPD Father       Social History     Tobacco Use    Smoking status: Former     Current packs/day: 0.00     Types: Cigarettes     Quit date: 1972     Years since quittin.0    Smokeless tobacco: Never   Substance Use Topics    Alcohol use: Yes     Comment: occassional    Drug use: No     Review of Systems   Constitutional:  Positive for fatigue. Negative for fever.   HENT:  Negative for sore throat.    Respiratory:  Negative for shortness of breath.    Cardiovascular:  Negative for chest pain.        Irregular heartbeat   Gastrointestinal:  Negative for abdominal pain, diarrhea, nausea and vomiting.   Genitourinary:  Negative for dysuria, frequency and urgency.   Musculoskeletal:  Negative for back pain.   Skin:  Negative for rash.   Neurological:  Negative for weakness, numbness and headaches.   Hematological:  Does not bruise/bleed easily.       Physical Exam     Initial Vitals [24 1556]   BP Pulse Resp Temp SpO2   (!) 170/94 72 18 98.1 °F (36.7 °C) 95 %      MAP       --         Physical Exam    Nursing note and vitals reviewed.  Constitutional: He appears well-developed and well-nourished.   HENT:   Head: Normocephalic and atraumatic.   Eyes: EOM are normal. Pupils are equal, round, and reactive to light.   Neck:   Normal range of motion.  Cardiovascular:  Normal heart sounds.           Frequent PVCs.   Pulmonary/Chest: Breath sounds normal. No respiratory distress.   Abdominal: Abdomen is soft. He exhibits no distension. There is no abdominal tenderness. There is no rebound.   Musculoskeletal:         General: Normal range of motion.      Cervical back: Normal range of motion.     Neurological: He is alert and oriented to person, place, and time. He has normal strength. No cranial nerve deficit  or sensory deficit. GCS score is 15. GCS eye subscore is 4. GCS verbal subscore is 5. GCS motor subscore is 6.   Skin: Capillary refill takes less than 2 seconds.   Psychiatric: He has a normal mood and affect.         ED Course   Procedures  Labs Reviewed   CBC W/ AUTO DIFFERENTIAL - Abnormal       Result Value    WBC 8.00      RBC 4.57 (*)     Hemoglobin 14.4      Hematocrit 41.8      MCV 92      MCH 31.5 (*)     MCHC 34.4      RDW 13.1      Platelets 223      MPV 8.8 (*)     Immature Granulocytes 0.3      Gran # (ANC) 5.3      Immature Grans (Abs) 0.02      Lymph # 1.7      Mono # 0.7      Eos # 0.2      Baso # 0.06      nRBC 0      Gran % 66.6      Lymph % 20.9      Mono % 9.3      Eosinophil % 2.1      Basophil % 0.8      Differential Method Automated     COMPREHENSIVE METABOLIC PANEL - Abnormal    Sodium 136      Potassium 4.0      Chloride 103      CO2 24      Glucose 93      BUN 18      Creatinine 0.9      Calcium 9.4      Total Protein 7.3      Albumin 4.3      Total Bilirubin 0.5      Alkaline Phosphatase 51 (*)     AST 23      ALT 16      eGFR >60.0      Anion Gap 9     TROPONIN I HIGH SENSITIVITY - Abnormal    Troponin I High Sensitivity 68.8 (*)    MAGNESIUM   B-TYPE NATRIURETIC PEPTIDE   TROPONIN I HIGH SENSITIVITY          Imaging Results              X-Ray Chest AP Portable (Final result)  Result time 08/09/24 21:40:29      Final result by Ilya Gomez MD (08/09/24 21:40:29)                   Impression:      As above.      Electronically signed by: Ilya Gomez MD  Date:    08/09/2024  Time:    21:40               Narrative:    EXAMINATION:  XR CHEST AP PORTABLE    CLINICAL HISTORY:  Presence of cardiac pacemaker    TECHNIQUE:  Single frontal view of the chest was performed.    COMPARISON:  08/06/2024    FINDINGS:  There is a left chest wall cardiac pacing device present.  Cardiac monitoring leads overlie the chest.  Cardiac silhouette is mildly prominent, similar to prior examination.   There are streaky perihilar and bibasilar interstitial lung markings which could reflect edema or atypical interstitial infection.  This appears mildly increased in conspicuity compared to prior examination.  No significant volume of pleural fluid or pneumothorax identified.  Osseous structures demonstrate mild degenerative changes.                                       Medications   losartan tablet 50 mg (50 mg Oral Given 8/9/24 1929)   metoprolol succinate (TOPROL-XL) 24 hr tablet 25 mg (has no administration in time range)   aspirin tablet 325 mg (has no administration in time range)     Medical Decision Making  Gilberto Lee is a 83 y.o. male with a past medical history hypertension, hyperlipidemia, and a bradycardia and bifascicular block status post recent pacemaker placement on 08/06 that presents emergency department with concerns for his blood pressure being elevated.  Blood pressure is 170/94.  Heart rate is 72.  Exam with nontoxic male.  Lungs are clear.  Heart sounds within normal limits.  Abdominal exam benign.  Differential includes but isn't limited to pacemaker failure, ACS, hypertensive emergency, arrhythmia, and electrolyte abnormality.    Amount and/or Complexity of Data Reviewed  External Data Reviewed: notes.     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  Patient did not tolerate beta-blockers due to bradycardia.  Labs: ordered.     Details: Troponin elevated to 68.8.  No significant electrolyte abnormality.  No significant anemia seen.  Radiology: ordered and independent interpretation performed.     Details: No acute cardiopulmonary abnormality.  ECG/medicine tests: ordered and independent interpretation performed.     Details: Paced rhythm with a frequent PVC.  Discussion of management or test interpretation with external provider(s): Given dose of losartan here for blood pressure.   Pacemaker was placed by Dr. Gonzalez on 8/6.  Called transfer center and discussed pacemaker with 1 of Dr. Gonzalez's partners, Dr. Duarte.  He believes it is unlikely that the pacemaker insertion is leading to more frequent PVCs.  Recommending initiating metoprolol 25 mg b.i.d. and having him follow-up in electrophysiology Clinic in approximately 2 weeks.  Given his elevated troponin, and he recommends bring him in to rule out for ACS.  Consulted Hospital Medicine.    Risk  OTC drugs.  Prescription drug management.  Decision regarding hospitalization.                                      Clinical Impression:  Final diagnoses:  [R03.0] Elevated blood pressure reading  [Z95.0] Pacemaker  [T82.9XXA] Pacemaker complications          ED Disposition Condition    Admit Stable                Gilberto Martinez MD  08/09/24 1551

## 2024-08-10 NOTE — ED NOTES
Patient stated he needed to urinate. Patient given a urinal and educated about a pure wick. Patient denied both options and stated he wanted to walk to the bathroom. Patient educated there was no way to monitor his heart and that the bathroom was far down the sauceda. Patient stated he was walking to the bathroom but understands the bathroom is far and there are no means to monitor his heart while walking to the restroom.

## 2024-08-10 NOTE — HPI
83 year old male with a PMHx of bradycardia, bifascicular block, HTN, HLD, hypothyroidism, preseved EF, PVC (need for BB to suppress complicated by bradycardia) s/p  PPM implant with Dr. Gonzalez on 8/6/24. He presents rto the emergency department with concerns for his blood pressure being elevated and irregular heart rate. Denies active chest pain, shortness of breath, nausea, vomiting, diaphoresis, leg swelling, numbness, and weakness. In the ER, pacemaker interrogation without abnormal findings without. In ER, troponin elevated 68.8, bp elevated. Losartan and metoprolol given in ER. EKG with Paced rhythm with a frequent PVC. No significant electrolyte abnormality. No significant anemia seen. ER physician to speak with Dr. Gonzalez, recommendations given dose of losartan here for blood pressure. Pacemaker was placed by Dr. Gonzalez on 8/6. Discussed pacemaker with Dr. Duarte. He believes it is unlikely that the pacemaker insertion is leading to more frequent PVCs. Recommending initiating metoprolol 25 mg b.i.d. and having him follow-up in electrophysiology Clinic in approximately 2 weeks. Given his elevated troponin. Admit to hospital medicine for further evaluation to rule out for ACS.     Per chart review, patient has had previous episodes of PVCs for which he was previously on a beta blocker.  He was unable to tolerate this secondary to chronotropic incompetence.

## 2024-08-10 NOTE — PLAN OF CARE
[facility]  Initial Discharge Assessment    Assessment completed at bedside with Pt, and all information on FaceSheet confirmed, including demographics, PCP, pharmacy and insurance. Pt has not addressed advance directives but does have a living will. He currently lives in [place] with his spouse. His PCP is Keith Dunne MD, and last appointment was 8/6/2024. His preferred pharmacy is [facility] [address]. He denies any DME/HH/HD/Blood Thinners. PaulenriqueKristin (Spouse)  [phone] (Mobile will transport back home after discharge. He denies any recent hospitalizations. Plan for discharge is to return home. Case Management to continue to follow for discharge planning needs.        Primary Care Provider: Mercedes Clemens MD    Admission Diagnosis: Elevated troponin [R79.89]    Admission Date: 8/9/2024  Expected Discharge Date: 8/10/2024    Transition of Care Barriers: None    Payor: MEDICARE / Plan: MEDICARE PART A & B / Product Type: Government /     Extended Emergency Contact Information  Primary Emergency Contact: Kristin Lee  Address: [address] [place] [address]  Home Phone: [phone]  Mobile Phone: [phone]  Relation: Spouse   needed? No    Discharge Plan A: Home with family  Discharge Plan B: Home      [facility] [address], [place] - [address] [place] [address]  [address] [place] [id number]  Phone: [phone] Fax: [phone]      Initial Assessment (most recent)       Adult Discharge Assessment - 08/10/24 1042          Discharge Assessment    Assessment Type Discharge Planning Assessment     Confirmed/corrected address, phone number and insurance Yes     Confirmed Demographics Correct on Facesheet     Source of Information patient     When was your last doctors appointment? 08/06/24     Does patient/caregiver understand observation status Yes     Reason For Admission Elevated troponin     People in Home spouse     Do  you expect to return to your current living situation? Yes     Do you have help at home or someone to help you manage your care at home? Yes     Who are your caregiver(s) and their phone number(s)? Kristin Lee (Spouse)  [phone] (Mobile)     Prior to hospitilization cognitive status: Alert/Oriented     Current cognitive status: Alert/Oriented     Walking or Climbing Stairs Difficulty no     Dressing/Bathing Difficulty no     Home Accessibility wheelchair accessible     Home Layout Able to live on 1st floor     Equipment Currently Used at Home none     Readmission within 30 days? No     Patient currently being followed by outpatient case management? No     Do you currently have service(s) that help you manage your care at home? No     Do you take prescription medications? Yes     Do you have prescription coverage? Yes     Coverage MEDICARE - MEDICARE PART A & B -     Do you have any problems affording any of your prescribed medications? No     Is the patient taking medications as prescribed? yes     Who is going to help you get home at discharge? Kristin Lee (Spouse)  [phone] (Mobile)     How do you get to doctors appointments? car, drives self     Are you on dialysis? No     Do you take coumadin? No     Discharge Plan A Home with family     Discharge Plan B Home     DME Needed Upon Discharge  none     Discharge Plan discussed with: Patient     Transition of Care Barriers None        Physical Activity    On average, how many days per week do you engage in moderate to strenuous exercise (like a brisk walk)? 2 days     On average, how many minutes do you engage in exercise at this level? 10 min        Financial Resource Strain    How hard is it for you to pay for the very basics like food, housing, medical care, and heating? Not hard at all        Housing Stability    In the last 12 months, was there a time when you were not able to pay the mortgage or rent on time? No     At any time in the past 12  months, were you homeless or living in a shelter (including now)? No        Transportation Needs    Has the lack of transportation kept you from medical appointments, meetings, work or from getting things needed for daily living? No        Food Insecurity    Within the past 12 months, you worried that your food would run out before you got the money to buy more. Never true     Within the past 12 months, the food you bought just didn't last and you didn't have money to get more. Never true        Stress    Do you feel stress - tense, restless, nervous, or anxious, or unable to sleep at night because your mind is troubled all the time - these days? Not at all        Social Isolation    How often do you feel lonely or isolated from those around you?  Never        Alcohol Use    Q1: How often do you have a drink containing alcohol? Never     Q2: How many drinks containing alcohol do you have on a typical day when you are drinking? Patient does not drink     Q3: How often do you have six or more drinks on one occasion? Never        Utilities    In the past 12 months has the electric, gas, oil, or water company threatened to shut off services in your home? No        Health Literacy    How often do you need to have someone help you when you read instructions, pamphlets, or other written material from your doctor or pharmacy? Never        OTHER    Name(s) of People in Home Kristin Lee (Spouse)  [phone] (Mobile)

## 2024-08-10 NOTE — H&P
[facility] - Emergency Dept  Hospital Medicine  History & Physical    Patient Name: Gilberto Lee  MRN: [id number]  Patient Class: OP- Observation  Admission Date: 8/9/2024  Attending Physician: Javier Austin MD   Primary Care Provider: Mercedes Clemens MD         Patient information was obtained from patient, past medical records, and ER records.     Subjective:     Principal Problem:Elevated troponin    Chief Complaint:   Chief Complaint   Patient presents with    Hypertension     Pt denies any symptoms associated with high bp.  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 Gonzalez on 8/6/24. He presents rto the emergency department with concerns for his blood pressure being elevated and irregular heart rate. Denies active chest pain, shortness of breath, nausea, vomiting, diaphoresis, leg swelling, numbness, and weakness. In the ER, pacemaker interrogation without abnormal findings without. In ER, troponin elevated 68.8, bp elevated. Losartan and metoprolol given in ER. EKG with Paced rhythm with a frequent PVC. No significant electrolyte abnormality. No significant anemia seen. ER physician to speak with Dr. Gonzalez, recommendations given dose of losartan here for blood pressure. Pacemaker was placed by Dr. Gonzalez on 8/6. Discussed pacemaker with Dr. Duarte. He believes it is unlikely that the pacemaker insertion is leading to more frequent PVCs. Recommending initiating metoprolol 25 mg b.i.d. and having him follow-up in electrophysiology Clinic in approximately 2 weeks. Given his elevated troponin. Admit to hospital medicine for further evaluation to rule out for ACS.     Per chart review, patient has had previous episodes of PVCs for which he was previously on a beta blocker.  He was unable to tolerate  this secondary to chronotropic incompetence.      Past Medical History:   Diagnosis Date    Arthritis     Atelectasis of left lung 07/2015    Bradycardia 07/17/2015    Calcium blood increased 08/09/2016    Depression     Diastolic dysfunction without heart failure 07/2015    Elevated BUN 08/09/2016    Fatigue 04/29/2015    Hyperglycemia 08/09/2016    Hyperlipidemia     Migraine without aura 10/2015    Prehypertension 11/2016    Testosterone deficiency 04/2015    Thyroid disease        Past Surgical History:   Procedure Laterality Date    A-V CARDIAC PACEMAKER INSERTION N/A 8/6/2024    Procedure: INSERTION, CARDIAC PACEMAKER, DUAL CHAMBER;  Surgeon: Paulino Gonzalez MD;  Location: [facility] EP LAB;  Service: Cardiology;  Laterality: N/A;  Bradycardia, Dual PPM, BIO, MAC, GP, 3 Prep    ACHILLES TENDON SURGERY      APPENDECTOMY      DUPUYTREN CONTRACTURE RELEASE  2014    LUMBAR EPIDURAL INJECTION  2013    PROSTATE SURGERY      SACROILIAC JOINT INJECTION      TONSILLECTOMY      TRANSURETHRAL RESECTION OF PROSTATE         Review of patient's allergies indicates:   Allergen Reactions    Demerol  [meperidine]      Agitation  Other reaction(s): Unknown       No current facility-administered medications on file prior to encounter.     Current Outpatient Medications on File Prior to Encounter   Medication Sig    ARIPiprazole (ABILIFY) 2 MG Tab Take 1 tablet (2 mg total) by mouth once daily.    buPROPion (WELLBUTRIN) 75 MG tablet Take 1 tablet (75 mg total) by mouth 6 (six) times daily. TWO TABLETS IN THE MORNING 2 TABLETS IN THE AFTERNOON TWO TABLETS BY MOUTH IN THE EVENING    CALCIUM CITRATE/VITAMIN D3 (CITRACAL + D ORAL) Take by mouth. Taking 2 tablets by mouth daily    doxycycline (MONODOX) 100 MG capsule Take 1 capsule (100 mg total) by mouth 2 (two) times daily. for 5 days    fluticasone propionate (FLONASE) 50 mcg/actuation nasal spray 1 spray (50 mcg total) by Each Nostril route once daily.    glucosamine-chondroit-vit C-Mn  500-400 mg capsule Take 1 tablet by mouth 3 (three) times daily. 3 Capsule Oral Every day    levothyroxine (SYNTHROID) 75 MCG tablet Take 75 mcg (1 tab) by mouth daily 6 days per week, and skip one day each week.    liothyronine (CYTOMEL) 5 MCG Tab Take 1 tablet (5 mcg total) by mouth 2 (two) times a day.    loratadine (CLARITIN) 10 mg tablet Take 10 mg by mouth once daily.    losartan (COZAAR) 25 MG tablet Take 1 tablet (25 mg total) by mouth once daily.    naproxen sodium (ANAPROX) 220 MG tablet Take 220 mg by mouth 2 (two) times daily with meals. 1 Tablet Oral Twice a day     omega-3 fatty acids 1,000 mg Cap Take 2 capsules by mouth once daily. 1 Capsule Oral Every day    simvastatin (ZOCOR) 20 MG tablet Take 1 tablet (20 mg total) by mouth once daily.    sumatriptan (IMITREX) 100 MG tablet Take 1 tablet (100 mg total) by mouth once as needed for Migraine.    vit C,F-Gy-szswy-lutein-zeaxan (PRESERVISION AREDS-2) 250-90-40-1 mg Cap     vit C/E/Zn/coppr/lutein/zeaxan (PRESERVISION AREDS-2 ORAL) Take by mouth once daily.     Family History       Problem Relation (Age of Onset)    COPD Father          Tobacco Use    Smoking status: Former     Current packs/day: 0.00     Types: Cigarettes     Quit date: 1972     Years since quittin.0    Smokeless tobacco: Never   Substance and Sexual Activity    Alcohol use: Yes     Comment: occassional    Drug use: No    Sexual activity: Yes     Review of Systems   Constitutional:  Positive for activity change and fatigue.   Eyes: Negative.    Respiratory: Negative.     Cardiovascular:         Irregular fast heart rate   Gastrointestinal: Negative.    Genitourinary: Negative.    Musculoskeletal: Negative.    Skin:  Positive for color change.        Pacemaker left upper chest wall.   Psychiatric/Behavioral: Negative.       Objective:     Vital Signs (Most Recent):  Temp: 98.1 °F (36.7 °C) (24 1556)  Pulse: 71 (24 2336)  Resp: 14 (24 2301)  BP: (!) 148/75  (08/09/24 2331)  SpO2: 96 % (08/09/24 2331) Vital Signs (24h Range):  Temp:  [98.1 °F (36.7 °C)] 98.1 °F (36.7 °C)  Pulse:  [61-93] 71  Resp:  [13-18] 14  SpO2:  [95 %-96 %] 96 %  BP: (143-196)/(75-98) 148/75     Weight: 93 kg (205 lb)  Body mass index is 26.32 kg/m².     Physical Exam  Vitals reviewed.   Constitutional:       Appearance: Normal appearance.   HENT:      Head: Normocephalic and atraumatic.      Mouth/Throat:      Mouth: Mucous membranes are moist.   Eyes:      Extraocular Movements: Extraocular movements intact.      Pupils: Pupils are equal, round, and reactive to light.   Cardiovascular:      Rate and Rhythm: Normal rate. Rhythm irregular.      Heart sounds: No murmur heard.  Pulmonary:      Effort: Pulmonary effort is normal. No respiratory distress.      Breath sounds: Normal breath sounds.   Abdominal:      General: Bowel sounds are normal.      Palpations: Abdomen is soft.   Musculoskeletal:         General: Normal range of motion.      Cervical back: Normal range of motion and neck supple.      Right lower leg: No edema.      Left lower leg: No edema.   Skin:     General: Skin is warm and dry.      Capillary Refill: Capillary refill takes less than 2 seconds.      Findings: Ecchymosis present.      Comments: Left chest wall s/p pacemaker implantation   Neurological:      General: No focal deficit present.      Mental Status: He is alert. Mental status is at baseline.   Psychiatric:         Mood and Affect: Mood normal.              CRANIAL NERVES     CN III, IV, VI   Pupils are equal, round, and reactive to light.       Significant Labs: All pertinent labs within the past 24 hours have been reviewed.  Recent Lab Results         08/09/24  1650        Albumin 4.3       ALP 51       ALT 16       Anion Gap 9       AST 23       Baso # 0.06       Basophil % 0.8       BILIRUBIN TOTAL 0.5  Comment: For infants and newborns, interpretation of results should be based  on gestational age, weight and in  agreement with clinical  observations.    Premature Infant recommended reference ranges:  Up to 24 hours.............<8.0 mg/dL  Up to 48 hours............<12.0 mg/dL  3-5 days..................<15.0 mg/dL  6-29 days.................<15.0 mg/dL         BUN 18       Calcium 9.4       Chloride 103       CO2 24       Creatinine 0.9       Differential Method Automated       eGFR >60.0       Eos # 0.2       Eos % 2.1       Glucose 93       Gran # (ANC) 5.3       Gran % 66.6       Hematocrit 41.8       Hemoglobin 14.4       Immature Grans (Abs) 0.02  Comment: Mild elevation in immature granulocytes is non specific and   can be seen in a variety of conditions including stress response,   acute inflammation, trauma and pregnancy. Correlation with other   laboratory and clinical findings is essential.         Immature Granulocytes 0.3       Lymph # 1.7       Lymph % 20.9       MCH 31.5       MCHC 34.4       MCV 92       Mono # 0.7       Mono % 9.3       MPV 8.8       nRBC 0       Platelet Count 223       Potassium 4.0       PROTEIN TOTAL 7.3       RBC 4.57       RDW 13.1       Sodium 136       Troponin I High Sensitivity 68.8  Comment: Troponin results differ between methods. Do not use   results between Troponin methods interchangeably.    Alkaline Phospatase levels above 400 U/L may   cause false positive results.    Access hsTnI should not be used for patients taking   Asfotase mervat (Strensiq).  Critical result TNIHS 68.8 pg/mL called to and read back by Arlin Villarreal  RN/ed at 09-Aug-2024 17:49 [facility].         WBC 8.00               Significant Imaging: I have reviewed all pertinent imaging results/findings within the past 24 hours.  Imaging Results              X-Ray Chest AP Portable (Final result)  Result time 08/09/24 21:40:29      Final result by Ilya Gomez MD (08/09/24 21:40:29)                   Impression:      As above.      Electronically signed by: Ilya Gomez  MD  Date:    08/09/2024  Time:    21:40               Narrative:    EXAMINATION:  XR CHEST AP PORTABLE    CLINICAL HISTORY:  Presence of cardiac pacemaker    TECHNIQUE:  Single frontal view of the chest was performed.    COMPARISON:  08/06/2024    FINDINGS:  There is a left chest wall cardiac pacing device present.  Cardiac monitoring leads overlie the chest.  Cardiac silhouette is mildly prominent, similar to prior examination.  There are streaky perihilar and bibasilar interstitial lung markings which could reflect edema or atypical interstitial infection.  This appears mildly increased in conspicuity compared to prior examination.  No significant volume of pleural fluid or pneumothorax identified.  Osseous structures demonstrate mild degenerative changes.                                      Assessment/Plan:     * Elevated troponin  Troponin I high sensitivity 68.8  Trend serial troponin  Tele monitoring    Consider cardiology consult.     Chest xray: left chest wall cardiac pacing device present. Cardiac monitoring leads overlie the chest. Cardiac silhouette is mildly prominent, similar to prior examination. There are streaky perihilar and bibasilar interstitial lung markings which could reflect edema or atypical interstitial infection.         Essential hypertension  Chronic, uncontrolled. Latest blood pressure and vitals reviewed-     Temp:  [98.1 °F (36.7 °C)]   Pulse:  [61-93]   Resp:  [13-18]   BP: (143-196)/(75-98)   SpO2:  [95 %-96 %] .   Home meds for hypertension were reviewed and noted below.   Hypertension Medications               losartan (COZAAR) 25 MG tablet Take 1 tablet (25 mg total) by mouth once daily.            While in the hospital, will manage blood pressure as follows; Continue home antihypertensive regimen    Will utilize p.r.n. blood pressure medication only if patient's blood pressure greater than 180/110 and he develops symptoms such as worsening chest pain or shortness of  breath.      VTE Risk Mitigation (From admission, onward)      None                 On 08/10/2024, patient should be placed in hospital observation services under my care in collaboration with Dr. Austin.           Zayra Jensen NP  Department of Hospital Medicine  [facility] - Emergency Dept

## 2024-08-10 NOTE — CARE UPDATE
08/10/24 0744   PRE-TX-O2   Device (Oxygen Therapy) room air   SpO2 95 %   $ Pulse Oximetry - Single Charge Pulse Oximetry - Single   Pulse (!) 56   Resp 15   Respiratory Evaluation   $ Care Plan Tech Time 15 min

## 2024-08-10 NOTE — PLAN OF CARE
Problem: Wound  Goal: Skin Health and Integrity  Outcome: Progressing  Goal: Optimal Wound Healing  Outcome: Progressing

## 2024-08-10 NOTE — PLAN OF CARE
Discharge orders and chart reviewed with no further post-acute discharge needs identified at this time.     Pt has an appointment that was scheduled prior to admission. Pt will be seen as a 7-day follow up with Dr. Dunne. Pt will discharge home and spouse will transport.    At this time, patient is cleared for discharge from Case Management standpoint.         08/10/24 1046   Final Note   Assessment Type Final Discharge Note   Anticipated Discharge Disposition Home   What phone number can be called within the next 1-3 days to see how you are doing after discharge? [phone]   Post-Acute Status   Coverage MEDICARE - MEDICARE PART A & B   Discharge Delays None known at this time

## 2024-08-10 NOTE — PLAN OF CARE
ESPINOZA completed with Pt at bedside. Pt verbalized understanding and signed ESPINOZA. ESPINOZA scanned into media.      08/10/24 1309   ESPINOZA Message   Medicare Outpatient and Observation Notification regarding financial responsibility Given to patient/caregiver;Explained to patient/caregiver;Signed/date by patient/caregiver   Date ESPINOZA was signed 08/10/24   Time ESPINOZA was signed 0930

## 2024-08-10 NOTE — HOSPITAL COURSE
Patient with history of bradycardia status post pacemaker placement on 08/06, hypertension, hypothyroidism presented with concerns for elevated blood pressure and irregular heart rate.  Discussions were held with patient's electrophysiologist and emergency room doctor, decision was made to start patient on Toprol.  Symptoms improving.  Blood pressure improving.  Patient instructed to follow up outpatient with electrophysiologist and cardiologist.    Physical Exam    Constitutional:       Appearance: Normal appearance.   HENT:      Head: Normocephalic and atraumatic.    Cardiovascular:      Rate and Rhythm: Normal rate.     Heart sounds: No murmur heard.

## 2024-08-10 NOTE — NURSING
Nurses Note -- 4 Eyes      8/10/2024   1:36 AM      Skin assessed during: Admit      [] No Altered Skin Integrity Present    []Prevention Measures Documented      [x] Yes- Altered Skin Integrity Present or Discovered   [x] LDA Added if Not in Epic (Describe Wound)   [x] New Altered Skin Integrity was Present on Admit and Documented in LDA   [x] Wound Image Taken    Wound Care Consulted? No    Attending Nurse:  Mary Beth Tam RN/Staff Member:  BUSTER Sutherland

## 2024-08-12 ENCOUNTER — TELEPHONE (OUTPATIENT)
Dept: CARDIOLOGY | Facility: CLINIC | Age: 84
End: 2024-08-12
Payer: MEDICARE

## 2024-08-12 ENCOUNTER — PATIENT OUTREACH (OUTPATIENT)
Dept: ADMINISTRATIVE | Facility: CLINIC | Age: 84
End: 2024-08-12
Payer: MEDICARE

## 2024-08-12 NOTE — TELEPHONE ENCOUNTER
----- Message from Sandi Barber sent at 8/12/2024  9:43 AM CDT -----  Contact: PT  Type:  Sooner Apoointment Request    Caller is requesting a sooner appointment.  Caller declined first available appointment listed below.  Caller will not accept being placed on the waitlist and is requesting a message be sent to doctor.  Name of Caller:PT   When is the first available appointment? 09/2024  Symptoms: HOSPITAL F/U - D/C 08/10/24... PT NEEDS AN APPT WITHIN ONE WEEK   Would the patient rather a call back or a response via [facility]? CALL   Best Call Back Number: [phone]  Additional Information: THANK YOU

## 2024-08-12 NOTE — PROGRESS NOTES
C3 nurse spoke with Gilberto Lee for a TCC post hospital discharge follow up call. The patient has a scheduled establishment of care appointment with Keith Dunne MD on 08/13/24 @ 0900.

## 2024-08-13 ENCOUNTER — PATIENT MESSAGE (OUTPATIENT)
Dept: FAMILY MEDICINE | Facility: CLINIC | Age: 84
End: 2024-08-13

## 2024-08-13 ENCOUNTER — OFFICE VISIT (OUTPATIENT)
Dept: FAMILY MEDICINE | Facility: CLINIC | Age: 84
End: 2024-08-13
Payer: MEDICARE

## 2024-08-13 ENCOUNTER — TELEPHONE (OUTPATIENT)
Dept: ELECTROPHYSIOLOGY | Facility: CLINIC | Age: 84
End: 2024-08-13
Payer: MEDICARE

## 2024-08-13 ENCOUNTER — CLINICAL SUPPORT (OUTPATIENT)
Dept: CARDIOLOGY | Facility: HOSPITAL | Age: 84
End: 2024-08-13
Attending: INTERNAL MEDICINE
Payer: MEDICARE

## 2024-08-13 VITALS
DIASTOLIC BLOOD PRESSURE: 78 MMHG | HEART RATE: 62 BPM | BODY MASS INDEX: 25.8 KG/M2 | WEIGHT: 201 LBS | HEIGHT: 74 IN | SYSTOLIC BLOOD PRESSURE: 126 MMHG

## 2024-08-13 DIAGNOSIS — R00.1 BRADYCARDIA: ICD-10-CM

## 2024-08-13 DIAGNOSIS — E07.9 THYROID DISEASE: Chronic | ICD-10-CM

## 2024-08-13 DIAGNOSIS — I45.2 BIFASCICULAR BUNDLE BRANCH BLOCK: ICD-10-CM

## 2024-08-13 DIAGNOSIS — I49.8 CHRONOTROPIC INCOMPETENCE WITH SINUS NODE DYSFUNCTION: Primary | ICD-10-CM

## 2024-08-13 DIAGNOSIS — Z95.0 CARDIAC PACEMAKER IN SITU: Primary | ICD-10-CM

## 2024-08-13 DIAGNOSIS — E78.2 MIXED DYSLIPIDEMIA: ICD-10-CM

## 2024-08-13 DIAGNOSIS — I10 ESSENTIAL HYPERTENSION: Chronic | ICD-10-CM

## 2024-08-13 DIAGNOSIS — F32.0 MILD MAJOR DEPRESSION: Chronic | ICD-10-CM

## 2024-08-13 DIAGNOSIS — Z95.0 CARDIAC PACEMAKER IN SITU: ICD-10-CM

## 2024-08-13 LAB
OHS QRS DURATION: 126 MS
OHS QTC CALCULATION: 464 MS

## 2024-08-13 PROCEDURE — 93005 ELECTROCARDIOGRAM TRACING: CPT | Performed by: INTERNAL MEDICINE

## 2024-08-13 PROCEDURE — 99999 PR PBB SHADOW E&M-EST. PATIENT-LVL III: CPT | Mod: PBBFAC,,, | Performed by: INTERNAL MEDICINE

## 2024-08-13 PROCEDURE — 93280 PM DEVICE PROGR EVAL DUAL: CPT

## 2024-08-13 PROCEDURE — 93280 PM DEVICE PROGR EVAL DUAL: CPT | Mod: 26,,, | Performed by: INTERNAL MEDICINE

## 2024-08-13 PROCEDURE — 99213 OFFICE O/P EST LOW 20 MIN: CPT | Mod: PBBFAC,PN | Performed by: INTERNAL MEDICINE

## 2024-08-13 PROCEDURE — 93010 ELECTROCARDIOGRAM REPORT: CPT | Mod: ,,, | Performed by: INTERNAL MEDICINE

## 2024-08-13 NOTE — PROGRESS NOTES
"Subjective:       Patient ID: Gilberto Lee is a 83 y.o. male.    Chief Complaint: Hyperlipidemia, Thyroid Problem, Fatigue, and Pacemaker placement     This is a new patient appointment for Mr. Gilberto Lee who is a 83-year-old gentleman.    He has been referred by Ms Kari Campos    Previous primary care physician:- Dr Sarath Tadeo     Medical problems as judged by his medications and brief review of epic system include the following:-    1.- hypertension currently noted on losartan 50 mg/metoprolol XL 20 5q24  2.-hyperlipidemia-noted on simvastatin 20 mg  3.-hypothyroidism noted to be on levothyroxine 75 mcg and Cytomel 5 mcg  4.- history of headaches/migraines noted to be previously on Imitrex  5.-noted to be on bupropion New line 6.-noted to be on aripiprazole New line 6.-noted to be on [employer]reds possibly for macular degeneration    Cardiac history:-    1.-bifascicular block  2.-premature ventricular contractions and treated with beta-blockers which cause bradycardia  3.-permanent pacemaker implanted by Dr. Ramón moon on 08/06/2024    Recent visit to the emergency room on 08/10/2024 for concerns about elevated blood pressures are irregular heart rate.  No chest pain or shortness on breath.  No other symptoms like nausea, diaphoresis, leg swelling or numbness or weakness.  He was found to have a elevated troponin of 68.8.    There was a concern about if the pacemaker was causing more ectopic beats or irregular beats.  Recommended to increase metoprolol to 25 b.i.d. and follow up with electrophysiology clinics in 2 weeks time.    Overall he feels fatigued and tired.  This has been going on for years.  It has been unclear if his fatigue and tiredness is because of any medical reasons like cardiac arrhythmias or inherent depression and anhedonia that he has been suffering since childhood.  He states that his depression and anhedonia is "organic".  It was with the father and uncle also.  " They were never treated.  He was not specifically point out to any reason that he should feel depressed like financial, relationship or losses.    He has been tried bupropion for several years.  (Approximately 30 years) Later on aripiprazole (6 months) was also added to the regimen which did not make any overall dent or improvement in his depression.  He recalls that years ago he was prescribed Prozac.  He does not remember about Zoloft or Lexapro or other anti depressants which are atypical in nature like venlafaxine ( Effexor) or newer atypical anti depressants like vilazodone    He finds happiness in fishing but the fatigue makes him a little tired.  Years and years back he would indulge in his grandchildren but they grown and gone.  His 2 boys are doing okay and they do not suffered from depression.      He quit smoking in 1974.      Alcohol use use his perhaps 1 drink a beer or bourbon in the evening.  No substance use or abuse.    He does feel that once Cytomel or liothyronine was added to the thyroxine regimen, he did better.    He is  and his wife is a retired nurse.  As mentioned above he was 2 boys who grown and gone.    As a part of his fatigue work up he had pulmonary, cardiac, sleep studies and testosterone determinations.        Hypertension  This is a chronic problem. The current episode started more than 1 year ago (Recent erratic. But low dose losartan for years.). Pertinent negatives include no chest pain, palpitations or shortness of breath. Past treatments include beta blockers and angiotensin blockers. The current treatment provides moderate improvement. Identifiable causes of hypertension include a thyroid problem.   Hyperlipidemia  This is a chronic problem. The current episode started more than 1 year ago (2009). The problem is controlled. He has no history of obesity. Pertinent negatives include no chest pain or shortness of breath. Current antihyperlipidemic treatment includes  statins. The current treatment provides moderate improvement of lipids.   Thyroid Problem  Presents for follow-up visit. Symptoms include fatigue. Patient reports no anxiety, cold intolerance, constipation, diarrhea, heat intolerance, palpitations, weight gain or weight loss. (Has Been on levothyroxine for 10 years and Cytomel recently for 6 months to 8 months.) The symptoms have been stable. His past medical history is significant for hyperlipidemia.   Fatigue  This is a chronic (4-5 yrs) problem. The current episode started more than 1 year ago. Associated symptoms include fatigue. Pertinent negatives include no abdominal pain, arthralgias, chest pain, congestion, coughing, numbness or rash.       Past Medical History:   Diagnosis Date    Arthritis     Atelectasis of left lung 2015    Bradycardia 2015    Calcium blood increased 2016    Depression     Diastolic dysfunction without heart failure 2015    Elevated BUN 2016    Fatigue 2015    Hyperglycemia 2016    Hyperlipidemia     Migraine without aura 10/2015    Prehypertension 2016    Testosterone deficiency 2015    Thyroid disease      Social History     Socioeconomic History    Marital status:    Occupational History    Occupation: retired   Tobacco Use    Smoking status: Former     Current packs/day: 0.00     Types: Cigarettes     Quit date: 1972     Years since quittin.0    Smokeless tobacco: Never   Substance and Sexual Activity    Alcohol use: Yes     Comment: occassional    Drug use: No    Sexual activity: Yes     Partners: Female     Social Determinants of Health     Financial Resource Strain: Low Risk  (8/10/2024)    Overall Financial Resource Strain (CARDIA)     Difficulty of Paying Living Expenses: Not hard at all   Food Insecurity: No Food Insecurity (8/10/2024)    Hunger Vital Sign     Worried About Running Out of Food in the Last Year: Never true     Ran Out of Food in the Last Year: Never  true   Transportation Needs: No Transportation Needs (8/10/2024)    TRANSPORTATION NEEDS     Transportation : No   Physical Activity: Insufficiently Active (8/10/2024)    Exercise Vital Sign     Days of Exercise per Week: 2 days     Minutes of Exercise per Session: 10 min   Stress: No Stress Concern Present (8/10/2024)    [employer] [place] [employer] - Occupational Stress Questionnaire     Feeling of Stress : Not at all   Housing Stability: Low Risk  (8/10/2024)    Housing Stability Vital Sign     Unable to Pay for Housing in the Last Year: No     Homeless in the Last Year: No     Past Surgical History:   Procedure Laterality Date    A-V CARDIAC PACEMAKER INSERTION N/A 8/6/2024    Procedure: INSERTION, CARDIAC PACEMAKER, DUAL CHAMBER;  Surgeon: Paulino Gonzalez MD;  Location: [facility];  Service: Cardiology;  Laterality: N/A;  Bradycardia, Dual PPM, BIO, MAC, GP, 3 Prep    ACHILLES TENDON SURGERY      APPENDECTOMY      DUPUYTREN CONTRACTURE RELEASE  2014    LUMBAR EPIDURAL INJECTION  2013    PROSTATE SURGERY      SACROILIAC JOINT INJECTION      TONSILLECTOMY      TRANSURETHRAL RESECTION OF PROSTATE       Family History   Problem Relation Name Age of Onset    COPD Father         Review of Systems   Constitutional:  Positive for fatigue. Negative for activity change, appetite change, unexpected weight change, weight gain and weight loss.   HENT:  Negative for congestion, sneezing and trouble swallowing.    Eyes:  Negative for pain, discharge and visual disturbance.   Respiratory:  Negative for cough, chest tightness and shortness of breath.    Cardiovascular:  Negative for chest pain, palpitations and leg swelling.        Recent pacemaker placement with bruises noted on the chest wall.   Gastrointestinal:  Negative for abdominal distention, abdominal pain, blood in stool, constipation and diarrhea.   Endocrine: Negative for cold intolerance, heat intolerance, polydipsia, polyphagia and polyuria.  "  Genitourinary:  Negative for dysuria, hematuria and scrotal swelling.   Musculoskeletal:  Negative for arthralgias, back pain and gait problem.   Skin:  Negative for pallor, rash and wound.   Allergic/Immunologic: Negative for environmental allergies, food allergies and immunocompromised state.   Neurological:  Negative for dizziness, seizures, speech difficulty, light-headedness and numbness.   Hematological:  Negative for adenopathy. Does not bruise/bleed easily.   Psychiatric/Behavioral:  Positive for behavioral problems. Negative for agitation and confusion. The patient is not nervous/anxious.         Chronic longstanding depression for several decades.         Objective:      Blood pressure 126/78, pulse 62, height 6' 2" (1.88 m), weight 91.2 kg (201 lb). Body mass index is 25.81 kg/m².  Physical Exam  Vitals reviewed.   Constitutional:       General: He is not in acute distress.     Appearance: He is not ill-appearing or diaphoretic.   HENT:      Head: Normocephalic.   Eyes:      General: No scleral icterus.  Cardiovascular:      Rate and Rhythm: Normal rate.      Pulses: Normal pulses.   Pulmonary:      Effort: Pulmonary effort is normal. No respiratory distress.   Chest:          Comments:  Bruising is noted around the pacemaker site.  Scratch marks on the sternum from recent tugging of bandage.  Abdominal:      General: There is no distension.      Palpations: Abdomen is soft. There is no mass.   Skin:     General: Skin is warm.   Neurological:      General: No focal deficit present.      Mental Status: He is alert.   Psychiatric:         Mood and Affect: Mood is depressed.         Cognition and Memory: Cognition is not impaired.      Comments: Anhedonic and dysthymic.  He is fairly oriented to the situation, his medications, his follow-ups and management of his affairs.               Recent pacemaker placement with expected bruising.  Scratch marks on the sternal area secondary to pulling of " bandage.  Assessment:       Admission on 08/09/2024, Discharged on 08/10/2024   Component Date Value Ref Range Status    QRS Duration 08/09/2024 74  ms In process    [facility] QTC Calculation 08/09/2024 443  ms In process    WBC 08/09/2024 8.00  3.90 - 12.70 K/uL Final    RBC 08/09/2024 4.57 (L)  4.60 - 6.20 M/uL Final    Hemoglobin 08/09/2024 14.4  14.0 - 18.0 g/dL Final    Hematocrit 08/09/2024 41.8  40.0 - 54.0 % Final    MCV 08/09/2024 92  82 - 98 fL Final    MCH 08/09/2024 31.5 (H)  27.0 - 31.0 pg Final    MCHC 08/09/2024 34.4  32.0 - 36.0 g/dL Final    RDW 08/09/2024 13.1  11.5 - 14.5 % Final    Platelets 08/09/2024 223  150 - 450 K/uL Final    MPV 08/09/2024 8.8 (L)  9.2 - 12.9 fL Final    Immature Granulocytes 08/09/2024 0.3  0.0 - 0.5 % Final    Gran # (ANC) 08/09/2024 5.3  1.8 - 7.7 K/uL Final    Immature Grans (Abs) 08/09/2024 0.02  0.00 - 0.04 K/uL Final    Lymph # 08/09/2024 1.7  1.0 - 4.8 K/uL Final    Mono # 08/09/2024 0.7  0.3 - 1.0 K/uL Final    Eos # 08/09/2024 0.2  0.0 - 0.5 K/uL Final    Baso # 08/09/2024 0.06  0.00 - 0.20 K/uL Final    nRBC 08/09/2024 0  0 /100 WBC Final    Gran % 08/09/2024 66.6  38.0 - 73.0 % Final    Lymph % 08/09/2024 20.9  18.0 - 48.0 % Final    Mono % 08/09/2024 9.3  4.0 - 15.0 % Final    Eosinophil % 08/09/2024 2.1  0.0 - 8.0 % Final    Basophil % 08/09/2024 0.8  0.0 - 1.9 % Final    Differential Method 08/09/2024 Automated   Final    Sodium 08/09/2024 136  136 - 145 mmol/L Final    Potassium 08/09/2024 4.0  3.5 - 5.1 mmol/L Final    Chloride 08/09/2024 103  95 - 110 mmol/L Final    CO2 08/09/2024 24  23 - 29 mmol/L Final    Glucose 08/09/2024 93  70 - 110 mg/dL Final    BUN 08/09/2024 18  8 - 23 mg/dL Final    Creatinine 08/09/2024 0.9  0.5 - 1.4 mg/dL Final    Calcium 08/09/2024 9.4  8.7 - 10.5 mg/dL Final    Total Protein 08/09/2024 7.3  6.0 - 8.4 g/dL Final    Albumin 08/09/2024 4.3  3.5 - 5.2 g/dL Final    Total Bilirubin 08/09/2024 0.5  0.1 - 1.0 mg/dL Final    Alkaline  Phosphatase 08/09/2024 51 (L)  55 - 135 U/L Final    AST 08/09/2024 23  10 - 40 U/L Final    ALT 08/09/2024 16  10 - 44 U/L Final    eGFR 08/09/2024 >60.0  >60 mL/min/1.73 m^2 Final    Anion Gap 08/09/2024 9  8 - 16 mmol/L Final    Troponin I High Sensitivity 08/09/2024 68.8 (HH)  0.0 - 14.9 pg/mL Final    Troponin I High Sensitivity 08/09/2024 58.3 (HH)  0.0 - 14.9 pg/mL Final    QRS Duration 08/09/2024 62  ms Final    OHS QTC Calculation 08/09/2024 429  ms Final    BNP 08/09/2024 67  0 - 99 pg/mL Final    Troponin I High Sensitivity 08/10/2024 49.3 (H)  0.0 - 14.9 pg/mL Final    WBC 08/10/2024 7.56  3.90 - 12.70 K/uL Final    RBC 08/10/2024 4.46 (L)  4.60 - 6.20 M/uL Final    Hemoglobin 08/10/2024 14.0  14.0 - 18.0 g/dL Final    Hematocrit 08/10/2024 41.8  40.0 - 54.0 % Final    MCV 08/10/2024 94  82 - 98 fL Final    MCH 08/10/2024 31.4 (H)  27.0 - 31.0 pg Final    MCHC 08/10/2024 33.5  32.0 - 36.0 g/dL Final    RDW 08/10/2024 12.9  11.5 - 14.5 % Final    Platelets 08/10/2024 215  150 - 450 K/uL Final    MPV 08/10/2024 8.9 (L)  9.2 - 12.9 fL Final    Immature Granulocytes 08/10/2024 0.3  0.0 - 0.5 % Final    Gran # (ANC) 08/10/2024 4.3  1.8 - 7.7 K/uL Final    Immature Grans (Abs) 08/10/2024 0.02  0.00 - 0.04 K/uL Final    Lymph # 08/10/2024 2.1  1.0 - 4.8 K/uL Final    Mono # 08/10/2024 0.9  0.3 - 1.0 K/uL Final    Eos # 08/10/2024 0.2  0.0 - 0.5 K/uL Final    Baso # 08/10/2024 0.06  0.00 - 0.20 K/uL Final    nRBC 08/10/2024 0  0 /100 WBC Final    Gran % 08/10/2024 56.2  38.0 - 73.0 % Final    Lymph % 08/10/2024 28.0  18.0 - 48.0 % Final    Mono % 08/10/2024 11.5  4.0 - 15.0 % Final    Eosinophil % 08/10/2024 3.2  0.0 - 8.0 % Final    Basophil % 08/10/2024 0.8  0.0 - 1.9 % Final    Differential Method 08/10/2024 Automated   Final    Magnesium 08/10/2024 2.2  1.6 - 2.6 mg/dL Final    Sodium 08/10/2024 137  136 - 145 mmol/L Final    Potassium 08/10/2024 4.1  3.5 - 5.1 mmol/L Final    Chloride 08/10/2024 102  95 -  110 mmol/L Final    CO2 08/10/2024 28  23 - 29 mmol/L Final    Glucose 08/10/2024 101  70 - 110 mg/dL Final    BUN 08/10/2024 15  8 - 23 mg/dL Final    Creatinine 08/10/2024 1.1  0.5 - 1.4 mg/dL Final    Calcium 08/10/2024 9.1  8.7 - 10.5 mg/dL Final    Total Protein 08/10/2024 7.0  6.0 - 8.4 g/dL Final    Albumin 08/10/2024 4.0  3.5 - 5.2 g/dL Final    Total Bilirubin 08/10/2024 0.6  0.1 - 1.0 mg/dL Final    Alkaline Phosphatase 08/10/2024 45 (L)  55 - 135 U/L Final    AST 08/10/2024 26  10 - 40 U/L Final    ALT 08/10/2024 15  10 - 44 U/L Final    eGFR 08/10/2024 >60.0  >60 mL/min/1.73 m^2 Final    Anion Gap 08/10/2024 7 (L)  8 - 16 mmol/L Final    Phosphorus 08/10/2024 3.7  2.7 - 4.5 mg/dL Final    Cholesterol 08/10/2024 156  120 - 199 mg/dL Final    Triglycerides 08/10/2024 114  30 - 150 mg/dL Final    HDL 08/10/2024 60  40 - 75 mg/dL Final    LDL Cholesterol 08/10/2024 73.2  63.0 - 159.0 mg/dL Final    HDL/Cholesterol Ratio 08/10/2024 38.5  20.0 - 50.0 % Final    Total Cholesterol/HDL Ratio 08/10/2024 2.6  2.0 - 5.0 Final    Non-HDL Cholesterol 08/10/2024 96  mg/dL Final   Admission on 08/06/2024, Discharged on 08/06/2024   Component Date Value Ref Range Status    QRS Duration 08/06/2024 106  ms Final    [facility]HS QTC Calculation 08/06/2024 431  ms Final    QRS Duration 08/06/2024 146  ms Final    [facility] QTC Calculation 08/06/2024 436  ms Final   Lab Visit on 08/06/2024   Component Date Value Ref Range Status    WBC 08/06/2024 6.00  3.90 - 12.70 K/uL Final    RBC 08/06/2024 4.83  4.60 - 6.20 M/uL Final    Hemoglobin 08/06/2024 14.7  14.0 - 18.0 g/dL Final    Hematocrit 08/06/2024 45.9  40.0 - 54.0 % Final    MCV 08/06/2024 95  82 - 98 fL Final    MCH 08/06/2024 30.4  27.0 - 31.0 pg Final    MCHC 08/06/2024 32.0  32.0 - 36.0 g/dL Final    RDW 08/06/2024 12.8  11.5 - 14.5 % Final    Platelets 08/06/2024 247  150 - 450 K/uL Final    MPV 08/06/2024 9.0 (L)  9.2 - 12.9 fL Final    Sodium 08/06/2024 138  136 - 145 mmol/L Final     Potassium 08/06/2024 4.6  3.5 - 5.1 mmol/L Final    Chloride 08/06/2024 104  95 - 110 mmol/L Final    CO2 08/06/2024 25  23 - 29 mmol/L Final    Glucose 08/06/2024 103  70 - 110 mg/dL Final    BUN 08/06/2024 17  8 - 23 mg/dL Final    Creatinine 08/06/2024 1.0  0.5 - 1.4 mg/dL Final    Calcium 08/06/2024 10.0  8.7 - 10.5 mg/dL Final    Anion Gap 08/06/2024 9  8 - 16 mmol/L Final    eGFR 08/06/2024 >60.0  >60 mL/min/1.73 m^2 Final    aPTT 08/06/2024 29.3  21.0 - 32.0 sec Final    Prothrombin Time 08/06/2024 10.3  9.0 - 12.5 sec Final    INR 08/06/2024 0.9  0.8 - 1.2 Final   Hospital Outpatient Visit on 05/16/2024   Component Date Value Ref Range Status    Holter Hookup Date 05/16/2024 [id number]   Final    Holter Hookup Time 05/16/2024 [id number]   Final    Holter Study End Date 05/16/2024 [id number]   Final    Holter Study End Time 05/16/2024 [id number]   Final    Holter Scan Date 05/16/2024 [id number]   Final    Sinus min HR 05/16/2024 47  bpm Final    Sinus max hr 05/16/2024 88  bpm Final    Sinus avg hr 05/16/2024 60  bpm Final    Event Monitor Day 05/16/2024 6   Final    Holter length hours 05/16/2024 1   Final    holter length minutes 05/16/2024 0   Final    holter length dec hours 05/16/2024 145.00   Final       1. Chronotropic incompetence with sinus node dysfunction  Comments:  Recent pacemaker placement.  He is hoping for improvement in his general fatigue.    2. Essential hypertension  Comments:  Currently on losartan 50, metoprolol 25    3. Mixed dyslipidemia  Comments:  currently on simvastatin 20 mg.    4. Thyroid disease  Comments:  Currently on a combination of levothyroxine and liothyronine.  Feels better with euthyroid any addition since last six-m    5. Mild major depression  Comments:  patient is functional but chronically anhedonic and never finds happiness.  Currently on Wellbutrin and aripiprazole.  Not much relief.  Overview:  PHQ 9 6/27  On wellbutrin and well treated. Continue  Denies SI/HI                Today was a introductory visit to the patient and his  problems.    Preventive care measures have been reviewed:-    1.- had 5 COVID vaccines thus far   2.-Tdap vaccination on 08/29/2014 which will be due now which he was to take from local pharmacy  3.-updated on influenza vaccination till 10/23/2023  4.-pneumonia vaccination given on 10/19/2022  Plan:   Chronotropic incompetence with sinus node dysfunction  Comments:  Recent pacemaker placement.  He is hoping for improvement in his general fatigue.    Essential hypertension  Comments:  Currently on losartan 50, metoprolol 25    Mixed dyslipidemia  Comments:  currently on simvastatin 20 mg.    Thyroid disease  Comments:  Currently on a combination of levothyroxine and liothyronine.  Feels better with euthyroid any addition since last six-m    Mild major depression  Comments:  patient is functional but chronically anhedonic and never finds happiness.  Currently on Wellbutrin and aripiprazole.  Not much relief.      Patient's medical issues have been noted.    His pulse rate somewhat irregular but in good rate.  He will keep his follow up with the cardiologist.      Longstanding depression/fatigue has been noted with no specific diagnosis.      He continues on bupropion and aripiprazole.  He does feel that adding liothyronine to his thyroxine regimen has helped him a little bit with depression and anhedonia.    As we meet each other again, I will try to review if any other medications like Prozac, Zoloft or Lexapro at tried for his depression in past.  He does not particularly feel anxious.    I have advised him to inculcate or hobbies and interests for example fishing.  The fatigue and tiredness makes him disinterested in fishing.  Hopefully his fatigue gets better and he goes back to fishing which is his hobby and his beach.      During future follow-ups will continue to see if any atypical antidepressants like vilazodone, venlafaxine might be helpful.   Addition of medications like Vraylar might make a difference instead of aripiprazole.  Certainly the cost of the medications might be somewhat restrictive but we can give it a try.    He does state that adding liothyronine to levothyroxine has made some difference in his overall sense of well-being or general fatigue.    I do not see any significant amount of beta-blockers which can make him tired or fatigued.  Statins has historically not caused any problems.    Workup done in past also has been reviewed without any significant incriminating finding.      As far as fatigue emanating probably from a viral infection like CMV or COVID needs to be a certain in future visits     It seems he is updated on pneumonia vaccine and Influenza  vaccination.   Tetanus vaccination will soon be due since the last 1 was on 08/29/2014.          Follow up in about 2 months (around 10/13/2024), or if symptoms worsen or fail to improve.      Current Outpatient Medications:     ARIPiprazole (ABILIFY) 2 MG Tab, Take 1 tablet (2 mg total) by mouth once daily., Disp: 90 tablet, Rfl: 1    buPROPion (WELLBUTRIN) 75 MG tablet, Take 1 tablet (75 mg total) by mouth 6 (six) times daily. TWO TABLETS IN THE MORNING 2 TABLETS IN THE AFTERNOON TWO TABLETS BY MOUTH IN THE EVENING, Disp: 540 tablet, Rfl: 1    CALCIUM CITRATE/VITAMIN D3 (CITRACAL + D ORAL), Take by mouth. Taking 2 tablets by mouth daily, Disp: , Rfl:     fluticasone propionate (FLONASE) 50 mcg/actuation nasal spray, 1 spray (50 mcg total) by Each Nostril route once daily. (Patient taking differently: 1 spray by Each Nostril route as needed for Rhinitis.), Disp: 48 mL, Rfl: 1    glucosamine-chondroit-vit C-Mn 500-400 mg capsule, Take 1 tablet by mouth 3 (three) times daily. 3 Capsule Oral Every day, Disp: , Rfl:     levothyroxine (SYNTHROID) 75 MCG tablet, Take 75 mcg (1 tab) by mouth daily 6 days per week, and skip one day each week., Disp: 90 tablet, Rfl: 3    liothyronine (CYTOMEL) 5  MCG Tab, Take 1 tablet (5 mcg total) by mouth 2 (two) times a day. (Patient taking differently: Take 5 mcg by mouth once daily.), Disp: , Rfl:     loratadine (CLARITIN) 10 mg tablet, Take 10 mg by mouth once daily., Disp: , Rfl:     losartan (COZAAR) 50 MG tablet, Take 1 tablet (50 mg total) by mouth once daily., Disp: 90 tablet, Rfl: 3    metoprolol succinate (TOPROL-XL) 25 MG 24 hr tablet, Take 1 tablet (25 mg total) by mouth 2 (two) times daily., Disp: 180 tablet, Rfl: 3    naproxen sodium (ANAPROX) 220 MG tablet, Take 220 mg by mouth once daily. 1 Tablet Oral Once daily, Disp: , Rfl:     omega-3 fatty acids 1,000 mg Cap, Take 2 capsules by mouth once daily., Disp: , Rfl:     simvastatin (ZOCOR) 20 MG tablet, Take 1 tablet (20 mg total) by mouth once daily., Disp: 90 tablet, Rfl: 3    sumatriptan (IMITREX) 100 MG tablet, Take 1 tablet (100 mg total) by mouth once as needed for Migraine., Disp: 30 tablet, Rfl: 3    vit C/E/Zn/coppr/lutein/zeaxan (PRESERVISION AREDS-2 ORAL), Take by mouth once daily., Disp: , Rfl:     Ketih Dunne

## 2024-08-15 LAB
OHS QRS DURATION: 74 MS
OHS QTC CALCULATION: 443 MS

## 2024-08-16 LAB — OHS CV DC REMOTE DEVICE TYPE: NORMAL

## 2024-08-26 ENCOUNTER — TELEPHONE (OUTPATIENT)
Dept: FAMILY MEDICINE | Facility: CLINIC | Age: 84
End: 2024-08-26
Payer: MEDICARE

## 2024-08-26 NOTE — TELEPHONE ENCOUNTER
Spoke with wife  soonest available to see Vibha is wed  wife did not want NP  advised to take pt to [facility]  if he could not wait til wed

## 2024-08-26 NOTE — TELEPHONE ENCOUNTER
----- Message from Devorah Figueroa sent at 8/26/2024 12:09 PM CDT -----  Kristin wife calling about pt being extremely depressed and needs to be seen by Dr. Dunne today if possible. Scheduled for this wednesday but pt does not want to wait that long.   [phone]

## 2024-08-27 NOTE — PROGRESS NOTES
"Subjective:       Patient ID: Gilberto Lee is a 83 y.o. male.    Chief Complaint: Depression (Follow-up) and Fatigue      Patient is a 83-year-old gentleman who comes for an early interim follow-up.  He was seen about a week back for establishment of care at his next follow-up was supposed to be in 2 months' time.    While he had been depressed for long period of time, he was getting bupropion from Dr. Michael in [place].  Dr. Hirsch is the well-known psychiatrist who is going to retire at this point or he might have already retired.  It has been difficult to find a new psychiatrist.  There is a shortage of psychiatrist in the community and in this [place].    He does admit that his depression became more worse around the pacemaker placement time when his energy and stamina levels plummeted.  He could not do the things that he likes.  He likes to go fishing.  He was not been able to go fishing.  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    His wife who is in attendance today testifies to the fact that his "art work" is   Excellent and top notch.      There was no history of Parkinson's.  He does have somewhat expressionless face she has.  No tremors.  No family history of Parkinson's.  No hypotonia though.          Fatigue  This is a chronic problem. The current episode started more than 1 year ago. The problem occurs constantly. The problem has been unchanged. Associated symptoms include arthralgias and fatigue. Pertinent negatives include no abdominal pain, chest pain, congestion, coughing, numbness or rash.   Depression  Visit Type: follow-up  Patient presents with the following symptoms: anhedonia, fatigue and malaise.  Patient is not experiencing: confusion, nervousness/anxiety, palpitations, shortness of breath, suicidal planning and thoughts of death.  Frequency of symptoms: constantly   " Severity: moderate   Sleep quality: fair        Past Medical History:   Diagnosis Date    Arthritis     Atelectasis of left lung 2015    Bradycardia 2015    Calcium blood increased 2016    Depression     Diastolic dysfunction without heart failure 2015    Elevated BUN 2016    Fatigue 2015    Hyperglycemia 2016    Hyperlipidemia     Migraine without aura 10/2015    Prehypertension 2016    Testosterone deficiency 2015    Thyroid disease      Social History     Socioeconomic History    Marital status:    Occupational History    Occupation: retired   Tobacco Use    Smoking status: Former     Current packs/day: 0.00     Types: Cigarettes     Quit date: 1972     Years since quittin.1    Smokeless tobacco: Never   Substance and Sexual Activity    Alcohol use: Yes     Comment: occassional    Drug use: No    Sexual activity: Yes     Partners: Female     Social Determinants of Health     Financial Resource Strain: Low Risk  (8/10/2024)    Overall Financial Resource Strain (CARDIA)     Difficulty of Paying Living Expenses: Not hard at all   Food Insecurity: No Food Insecurity (8/10/2024)    Hunger Vital Sign     Worried About Running Out of Food in the Last Year: Never true     Ran Out of Food in the Last Year: Never true   Transportation Needs: No Transportation Needs (8/10/2024)    TRANSPORTATION NEEDS     Transportation : No   Physical Activity: Insufficiently Active (8/10/2024)    Exercise Vital Sign     Days of Exercise per Week: 2 days     Minutes of Exercise per Session: 10 min   Stress: No Stress Concern Present (8/10/2024)    Citizen of [place] of Occupational Health - Occupational Stress Questionnaire     Feeling of Stress : Not at all   Housing Stability: Low Risk  (8/10/2024)    Housing Stability Vital Sign     Unable to Pay for Housing in the Last Year: No     Homeless in the Last Year: No     Past Surgical History:   Procedure Laterality Date    A-V  CARDIAC PACEMAKER INSERTION N/A 8/6/2024    Procedure: INSERTION, CARDIAC PACEMAKER, DUAL CHAMBER;  Surgeon: Paulino Gonzalez MD;  Location[facility];  Service: Cardiology;  Laterality: N/A;  Bradycardia, Dual PPM, BIO, MAC, GP, 3 Prep    ACHILLES TENDON SURGERY      APPENDECTOMY      DUPUYTREN CONTRACTURE RELEASE  2014    LUMBAR EPIDURAL INJECTION  2013    PROSTATE SURGERY      SACROILIAC JOINT INJECTION      TONSILLECTOMY      TRANSURETHRAL RESECTION OF PROSTATE       Family History   Problem Relation Name Age of Onset    COPD Father         Review of Systems   Constitutional:  Positive for fatigue. Negative for activity change, appetite change and unexpected weight change.   HENT:  Negative for congestion, sneezing and trouble swallowing.    Eyes:  Negative for pain, discharge and visual disturbance.   Respiratory:  Negative for cough, chest tightness and shortness of breath.    Cardiovascular:  Negative for chest pain, palpitations and leg swelling.        Recent pacemaker placement with bruises noted on the chest wall.   Gastrointestinal:  Negative for abdominal distention, abdominal pain, blood in stool, constipation and diarrhea.   Endocrine: Negative for cold intolerance, heat intolerance, polydipsia, polyphagia and polyuria.   Genitourinary:  Negative for dysuria, hematuria and scrotal swelling.   Musculoskeletal:  Positive for arthralgias. Negative for back pain and gait problem.   Skin:  Negative for pallor, rash and wound.   Allergic/Immunologic: Negative for environmental allergies, food allergies and immunocompromised state.   Neurological:  Negative for dizziness, seizures, speech difficulty, light-headedness and numbness.   Hematological:  Negative for adenopathy. Does not bruise/bleed easily.   Psychiatric/Behavioral:  Positive for behavioral problems, depression and dysphoric mood. Negative for agitation and confusion. The patient is not nervous/anxious.         Chronic longstanding depression  "for several decades.         Objective:      Blood pressure 131/83, pulse 60, height 6' 2" (1.88 m), weight 91.4 kg (201 lb 6.4 oz), SpO2 99%. Body mass index is 25.86 kg/m².  Physical Exam  Vitals reviewed.   Constitutional:       General: He is not in acute distress.     Appearance: He is not ill-appearing or diaphoretic.      Comments:   BMI is  25.86   HENT:      Head: Normocephalic.   Eyes:      General: No scleral icterus.  Cardiovascular:      Rate and Rhythm: Normal rate.      Pulses: Normal pulses.   Pulmonary:      Effort: Pulmonary effort is normal. No respiratory distress.   Chest:          Comments:  Bruising is noted around the pacemaker site.  Scratch marks on the sternum from recent tugging of bandage.  Abdominal:      General: There is no distension.      Palpations: Abdomen is soft. There is no mass.   Skin:     General: Skin is warm.      Findings: No lesion.   Neurological:      General: No focal deficit present.      Mental Status: He is alert.      Motor: No tremor.      Comments:   Somewhat expression less which he was.  No tremors at rest and minimal perhaps at intention.  No hypotonia.  Speech has a slow petra   Psychiatric:         Mood and Affect: Mood is depressed.         Behavior: Behavior is slowed.         Cognition and Memory: Cognition is not impaired.      Comments: Anhedonic and dysthymic.  He is fairly oriented to the situation, his medications, his follow-ups and management of his affairs.             Assessment:       Clinical Support on 08/13/2024   Component Date Value Ref Range Status    Device Type 08/13/2024 Pacemaker   Final    QRS Duration 08/13/2024 126  ms Final    [facility] QTC Calculation 08/13/2024 464  ms Final   Admission on 08/09/2024, Discharged on 08/10/2024   Component Date Value Ref Range Status    QRS Duration 08/09/2024 74  ms Final    [facility] QTC Calculation 08/09/2024 443  ms Final    WBC 08/09/2024 8.00  3.90 - 12.70 K/uL Final    RBC 08/09/2024 4.57 (L)  4.60 - " 6.20 M/uL Final    Hemoglobin 08/09/2024 14.4  14.0 - 18.0 g/dL Final    Hematocrit 08/09/2024 41.8  40.0 - 54.0 % Final    MCV 08/09/2024 92  82 - 98 fL Final    MCH 08/09/2024 31.5 (H)  27.0 - 31.0 pg Final    MCHC 08/09/2024 34.4  32.0 - 36.0 g/dL Final    RDW 08/09/2024 13.1  11.5 - 14.5 % Final    Platelets 08/09/2024 223  150 - 450 K/uL Final    MPV 08/09/2024 8.8 (L)  9.2 - 12.9 fL Final    Immature Granulocytes 08/09/2024 0.3  0.0 - 0.5 % Final    Gran # (ANC) 08/09/2024 5.3  1.8 - 7.7 K/uL Final    Immature Grans (Abs) 08/09/2024 0.02  0.00 - 0.04 K/uL Final    Lymph # 08/09/2024 1.7  1.0 - 4.8 K/uL Final    Mono # 08/09/2024 0.7  0.3 - 1.0 K/uL Final    Eos # 08/09/2024 0.2  0.0 - 0.5 K/uL Final    Baso # 08/09/2024 0.06  0.00 - 0.20 K/uL Final    nRBC 08/09/2024 0  0 /100 WBC Final    Gran % 08/09/2024 66.6  38.0 - 73.0 % Final    Lymph % 08/09/2024 20.9  18.0 - 48.0 % Final    Mono % 08/09/2024 9.3  4.0 - 15.0 % Final    Eosinophil % 08/09/2024 2.1  0.0 - 8.0 % Final    Basophil % 08/09/2024 0.8  0.0 - 1.9 % Final    Differential Method 08/09/2024 Automated   Final    Sodium 08/09/2024 136  136 - 145 mmol/L Final    Potassium 08/09/2024 4.0  3.5 - 5.1 mmol/L Final    Chloride 08/09/2024 103  95 - 110 mmol/L Final    CO2 08/09/2024 24  23 - 29 mmol/L Final    Glucose 08/09/2024 93  70 - 110 mg/dL Final    BUN 08/09/2024 18  8 - 23 mg/dL Final    Creatinine 08/09/2024 0.9  0.5 - 1.4 mg/dL Final    Calcium 08/09/2024 9.4  8.7 - 10.5 mg/dL Final    Total Protein 08/09/2024 7.3  6.0 - 8.4 g/dL Final    Albumin 08/09/2024 4.3  3.5 - 5.2 g/dL Final    Total Bilirubin 08/09/2024 0.5  0.1 - 1.0 mg/dL Final    Alkaline Phosphatase 08/09/2024 51 (L)  55 - 135 U/L Final    AST 08/09/2024 23  10 - 40 U/L Final    ALT 08/09/2024 16  10 - 44 U/L Final    eGFR 08/09/2024 >60.0  >60 mL/min/1.73 m^2 Final    Anion Gap 08/09/2024 9  8 - 16 mmol/L Final    Troponin I High Sensitivity 08/09/2024 68.8 (HH)  0.0 - 14.9 pg/mL  Final    Troponin I High Sensitivity 08/09/2024 58.3 (HH)  0.0 - 14.9 pg/mL Final    QRS Duration 08/09/2024 62  ms Final    [facility] QTC Calculation 08/09/2024 429  ms Final    BNP 08/09/2024 67  0 - 99 pg/mL Final    Troponin I High Sensitivity 08/10/2024 49.3 (H)  0.0 - 14.9 pg/mL Final    WBC 08/10/2024 7.56  3.90 - 12.70 K/uL Final    RBC 08/10/2024 4.46 (L)  4.60 - 6.20 M/uL Final    Hemoglobin 08/10/2024 14.0  14.0 - 18.0 g/dL Final    Hematocrit 08/10/2024 41.8  40.0 - 54.0 % Final    MCV 08/10/2024 94  82 - 98 fL Final    MCH 08/10/2024 31.4 (H)  27.0 - 31.0 pg Final    MCHC 08/10/2024 33.5  32.0 - 36.0 g/dL Final    RDW 08/10/2024 12.9  11.5 - 14.5 % Final    Platelets 08/10/2024 215  150 - 450 K/uL Final    MPV 08/10/2024 8.9 (L)  9.2 - 12.9 fL Final    Immature Granulocytes 08/10/2024 0.3  0.0 - 0.5 % Final    Gran # (ANC) 08/10/2024 4.3  1.8 - 7.7 K/uL Final    Immature Grans (Abs) 08/10/2024 0.02  0.00 - 0.04 K/uL Final    Lymph # 08/10/2024 2.1  1.0 - 4.8 K/uL Final    Mono # 08/10/2024 0.9  0.3 - 1.0 K/uL Final    Eos # 08/10/2024 0.2  0.0 - 0.5 K/uL Final    Baso # 08/10/2024 0.06  0.00 - 0.20 K/uL Final    nRBC 08/10/2024 0  0 /100 WBC Final    Gran % 08/10/2024 56.2  38.0 - 73.0 % Final    Lymph % 08/10/2024 28.0  18.0 - 48.0 % Final    Mono % 08/10/2024 11.5  4.0 - 15.0 % Final    Eosinophil % 08/10/2024 3.2  0.0 - 8.0 % Final    Basophil % 08/10/2024 0.8  0.0 - 1.9 % Final    Differential Method 08/10/2024 Automated   Final    Magnesium 08/10/2024 2.2  1.6 - 2.6 mg/dL Final    Sodium 08/10/2024 137  136 - 145 mmol/L Final    Potassium 08/10/2024 4.1  3.5 - 5.1 mmol/L Final    Chloride 08/10/2024 102  95 - 110 mmol/L Final    CO2 08/10/2024 28  23 - 29 mmol/L Final    Glucose 08/10/2024 101  70 - 110 mg/dL Final    BUN 08/10/2024 15  8 - 23 mg/dL Final    Creatinine 08/10/2024 1.1  0.5 - 1.4 mg/dL Final    Calcium 08/10/2024 9.1  8.7 - 10.5 mg/dL Final    Total Protein 08/10/2024 7.0  6.0 - 8.4 g/dL  Final    Albumin 08/10/2024 4.0  3.5 - 5.2 g/dL Final    Total Bilirubin 08/10/2024 0.6  0.1 - 1.0 mg/dL Final    Alkaline Phosphatase 08/10/2024 45 (L)  55 - 135 U/L Final    AST 08/10/2024 26  10 - 40 U/L Final    ALT 08/10/2024 15  10 - 44 U/L Final    eGFR 08/10/2024 >60.0  >60 mL/min/1.73 m^2 Final    Anion Gap 08/10/2024 7 (L)  8 - 16 mmol/L Final    Phosphorus 08/10/2024 3.7  2.7 - 4.5 mg/dL Final    Cholesterol 08/10/2024 156  120 - 199 mg/dL Final    Triglycerides 08/10/2024 114  30 - 150 mg/dL Final    HDL 08/10/2024 60  40 - 75 mg/dL Final    LDL Cholesterol 08/10/2024 73.2  63.0 - 159.0 mg/dL Final    HDL/Cholesterol Ratio 08/10/2024 38.5  20.0 - 50.0 % Final    Total Cholesterol/HDL Ratio 08/10/2024 2.6  2.0 - 5.0 Final    Non-HDL Cholesterol 08/10/2024 96  mg/dL Final   Admission on 08/06/2024, Discharged on 08/06/2024   Component Date Value Ref Range Status    QRS Duration 08/06/2024 106  ms Final    [facility]HS QTC Calculation 08/06/2024 431  ms Final    QRS Duration 08/06/2024 146  ms Final    [facility] QTC Calculation 08/06/2024 436  ms Final   Lab Visit on 08/06/2024   Component Date Value Ref Range Status    WBC 08/06/2024 6.00  3.90 - 12.70 K/uL Final    RBC 08/06/2024 4.83  4.60 - 6.20 M/uL Final    Hemoglobin 08/06/2024 14.7  14.0 - 18.0 g/dL Final    Hematocrit 08/06/2024 45.9  40.0 - 54.0 % Final    MCV 08/06/2024 95  82 - 98 fL Final    MCH 08/06/2024 30.4  27.0 - 31.0 pg Final    MCHC 08/06/2024 32.0  32.0 - 36.0 g/dL Final    RDW 08/06/2024 12.8  11.5 - 14.5 % Final    Platelets 08/06/2024 247  150 - 450 K/uL Final    MPV 08/06/2024 9.0 (L)  9.2 - 12.9 fL Final    Sodium 08/06/2024 138  136 - 145 mmol/L Final    Potassium 08/06/2024 4.6  3.5 - 5.1 mmol/L Final    Chloride 08/06/2024 104  95 - 110 mmol/L Final    CO2 08/06/2024 25  23 - 29 mmol/L Final    Glucose 08/06/2024 103  70 - 110 mg/dL Final    BUN 08/06/2024 17  8 - 23 mg/dL Final    Creatinine 08/06/2024 1.0  0.5 - 1.4 mg/dL Final    Calcium  08/06/2024 10.0  8.7 - 10.5 mg/dL Final    Anion Gap 08/06/2024 9  8 - 16 mmol/L Final    eGFR 08/06/2024 >60.0  >60 mL/min/1.73 m^2 Final    aPTT 08/06/2024 29.3  21.0 - 32.0 sec Final    Prothrombin Time 08/06/2024 10.3  9.0 - 12.5 sec Final    INR 08/06/2024 0.9  0.8 - 1.2 Final       1. 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  Dispense: 30 capsule; Refill: 5    2. Fatigue, unspecified type  Comments:  Multifactorial maybe contributing to his depression also.  Major labs, blood counts, LFT, K FT and thyroid normal.      PHQ testing was done and he scored 12 indicating moderate depression.      The PHQ testing sheet was given to the medical staff for scanning but I can not find interim system.         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  Dispense: 30 capsule; Refill: 5    Fatigue, unspecified type  Comments:  Multifactorial maybe contributing to his depression also.  Major labs, blood counts, LFT, K FT and thyroid normal.          Patient has persistent depression continues at this point.  He is currently taking bupropion 75 mg 6 tablets.  I did offer him to consider taking to long-acting tablets per day to make it 300 mg but he finds no benefit in the long-acting.    Aripiprazole has not really helped him.      I have reviewed other factors which could contribute to sluggishness or lethargy including thyroid tests, vitamin-D level and stress hormones and they were all within range.  He had also been checked for testosterone.  While his total testosterone is normal, his free testosterone out on be.       He does seem to have low energy levels which called his and feeling of depression.  He does feel that if his pacemaker works, he might get his energy level back to pursue his happiness and desires  which may make him feel better.     I will give a trial of Vraylar starting with 1.5 mg and see how he does.      While he was a longstanding history of depression, the resistance might be due to fatigue and lack of get go.  My gut feeling is that any form of psychiatric intervention may not help him till his physical conditions improved.  That would be the major question at this point.  Had a lengthy discussion with patient and his spouse also.     Follow up in about 6 weeks (around 10/9/2024), or if symptoms worsen or fail to improve, for Depression and Fatigue.     I have advised the family to respond to me in the patient's portal as to how   He was doing after 3 weeks of taking Vraylar 1.5 mg. Of course primarily could need to be sure that it is covered.    Keith Dunne

## 2024-08-28 ENCOUNTER — PATIENT MESSAGE (OUTPATIENT)
Dept: FAMILY MEDICINE | Facility: CLINIC | Age: 84
End: 2024-08-28

## 2024-08-28 ENCOUNTER — OFFICE VISIT (OUTPATIENT)
Dept: FAMILY MEDICINE | Facility: CLINIC | Age: 84
End: 2024-08-28
Payer: MEDICARE

## 2024-08-28 VITALS
HEART RATE: 60 BPM | DIASTOLIC BLOOD PRESSURE: 83 MMHG | OXYGEN SATURATION: 99 % | SYSTOLIC BLOOD PRESSURE: 131 MMHG | BODY MASS INDEX: 25.84 KG/M2 | WEIGHT: 201.38 LBS | HEIGHT: 74 IN

## 2024-08-28 DIAGNOSIS — R53.83 FATIGUE, UNSPECIFIED TYPE: ICD-10-CM

## 2024-08-28 DIAGNOSIS — F33.9 RECURRENT MAJOR DEPRESSION RESISTANT TO TREATMENT: Primary | ICD-10-CM

## 2024-08-28 PROCEDURE — 99214 OFFICE O/P EST MOD 30 MIN: CPT | Mod: PBBFAC,PN | Performed by: INTERNAL MEDICINE

## 2024-08-28 PROCEDURE — 99214 OFFICE O/P EST MOD 30 MIN: CPT | Mod: S$PBB,AQ,, | Performed by: INTERNAL MEDICINE

## 2024-08-28 PROCEDURE — 99999 PR PBB SHADOW E&M-EST. PATIENT-LVL IV: CPT | Mod: PBBFAC,,, | Performed by: INTERNAL MEDICINE

## 2024-08-28 RX ORDER — CARIPRAZINE 1.5 MG/1
1.5 CAPSULE, GELATIN COATED ORAL DAILY
Qty: 30 CAPSULE | Refills: 5 | Status: SHIPPED | OUTPATIENT
Start: 2024-08-28

## 2024-09-06 ENCOUNTER — CLINICAL SUPPORT (OUTPATIENT)
Dept: CARDIOLOGY | Facility: HOSPITAL | Age: 84
End: 2024-09-06
Attending: INTERNAL MEDICINE
Payer: MEDICARE

## 2024-09-06 DIAGNOSIS — R00.1 BRADYCARDIA, UNSPECIFIED: ICD-10-CM

## 2024-09-06 PROCEDURE — 93294 REM INTERROG EVL PM/LDLS PM: CPT | Mod: ,,, | Performed by: INTERNAL MEDICINE

## 2024-09-13 LAB
OHS CV AF BURDEN PERCENT: < 1
OHS CV DC REMOTE DEVICE TYPE: NORMAL
OHS CV RV PACING PERCENT: 9 %

## 2024-11-04 DIAGNOSIS — Z95.0 CARDIAC PACEMAKER IN SITU: Primary | ICD-10-CM

## 2025-02-24 DIAGNOSIS — Z00.00 ENCOUNTER FOR MEDICARE ANNUAL WELLNESS EXAM: ICD-10-CM

## (undated) DEVICE — IMPLANTABLE DEVICE
Type: IMPLANTABLE DEVICE | Site: HEART | Status: NON-FUNCTIONAL
Brand: SOLIA
Removed: 2024-08-06

## (undated) DEVICE — ADHESIVE DERMABOND ADVANCED

## (undated) DEVICE — CLAMP TOWEL EASY RELEASE TAB

## (undated) DEVICE — ELECTRODE REM PLYHSV RETURN 9

## (undated) DEVICE — PACK PACER PERMANENT OMC

## (undated) DEVICE — SPONGE LAP XRAY STERILE 18X18

## (undated) DEVICE — CABLE LEAD REMINGTON 301-CG

## (undated) DEVICE — KIT SELECTRA ACCESSORY

## (undated) DEVICE — SHEATH SELECTRA 50MM 42CM

## (undated) DEVICE — TOWEL OR DISP STRL BLUE 4/PK

## (undated) DEVICE — COVER INSTR ELASTIC BAND 40X20

## (undated) DEVICE — DRAPE INCISE IOBAN 2 23X17IN

## (undated) DEVICE — PAD DEFIB CADENCE ADULT R2

## (undated) DEVICE — SHEATH PRELUDE 9X13CM SNAP

## (undated) DEVICE — SHEATH SAFESHEATH II ULTRA 6FR

## (undated) DEVICE — SHEATH SELECTRA 65MM 42CM